# Patient Record
Sex: MALE | Race: WHITE | Employment: OTHER | URBAN - METROPOLITAN AREA
[De-identification: names, ages, dates, MRNs, and addresses within clinical notes are randomized per-mention and may not be internally consistent; named-entity substitution may affect disease eponyms.]

---

## 2019-05-03 ENCOUNTER — TELEPHONE (OUTPATIENT)
Dept: INTERNAL MEDICINE CLINIC | Age: 79
End: 2019-05-03

## 2019-05-03 NOTE — TELEPHONE ENCOUNTER
Pt is requesting to speak with someone regarding upcoming surgery. Pt best contact is [phone]. Message received & copied from [employer].  Has a New Patient appt on 5/28/19

## 2019-05-03 NOTE — TELEPHONE ENCOUNTER
Called, spoke to pt. Two identifiers confirmed. Recommended pt call daily for cancellations. Pt verbalized understanding of information discussed w/ no further questions at this time.

## 2019-05-28 ENCOUNTER — OFFICE VISIT (OUTPATIENT)
Dept: INTERNAL MEDICINE CLINIC | Age: 79
End: 2019-05-28

## 2019-05-28 VITALS
TEMPERATURE: 97.1 F | HEART RATE: 62 BPM | SYSTOLIC BLOOD PRESSURE: 143 MMHG | OXYGEN SATURATION: 95 % | RESPIRATION RATE: 16 BRPM | DIASTOLIC BLOOD PRESSURE: 76 MMHG | HEIGHT: 70 IN | BODY MASS INDEX: 28.35 KG/M2 | WEIGHT: 198 LBS

## 2019-05-28 DIAGNOSIS — I25.10 CORONARY ARTERY DISEASE INVOLVING NATIVE CORONARY ARTERY OF NATIVE HEART WITHOUT ANGINA PECTORIS: ICD-10-CM

## 2019-05-28 DIAGNOSIS — E78.00 PURE HYPERCHOLESTEROLEMIA: ICD-10-CM

## 2019-05-28 DIAGNOSIS — I10 ESSENTIAL HYPERTENSION: ICD-10-CM

## 2019-05-28 DIAGNOSIS — Z12.5 PROSTATE CANCER SCREENING: ICD-10-CM

## 2019-05-28 DIAGNOSIS — R73.09 ELEVATED GLUCOSE: ICD-10-CM

## 2019-05-28 DIAGNOSIS — M25.551 RIGHT HIP PAIN: Primary | ICD-10-CM

## 2019-05-28 PROBLEM — L71.9 ROSACEA: Status: ACTIVE | Noted: 2019-05-28

## 2019-05-28 PROBLEM — G60.9 IDIOPATHIC PERIPHERAL NEUROPATHY: Status: ACTIVE | Noted: 2019-05-28

## 2019-05-28 PROBLEM — Z86.010 HX OF COLONIC POLYPS: Status: ACTIVE | Noted: 2019-05-28

## 2019-05-28 PROBLEM — Z87.39 HX OF GOUT: Status: ACTIVE | Noted: 2019-05-28

## 2019-05-28 RX ORDER — HYDROCODONE BITARTRATE AND ACETAMINOPHEN 5; 325 MG/1; MG/1
1 TABLET ORAL
Qty: 20 TAB | Refills: 0 | Status: SHIPPED | OUTPATIENT
Start: 2019-05-28 | End: 2019-05-31

## 2019-05-28 RX ORDER — ALLOPURINOL 100 MG/1
100 TABLET ORAL DAILY
Qty: 90 TAB | Refills: 3 | Status: SHIPPED | OUTPATIENT
Start: 2019-05-28 | End: 2020-06-09

## 2019-05-28 RX ORDER — PYRIDOXINE HCL (VITAMIN B6) 100 MG
100 TABLET ORAL DAILY
COMMUNITY
End: 2021-12-07 | Stop reason: ALTCHOICE

## 2019-05-28 RX ORDER — BISMUTH SUBSALICYLATE 262 MG
1 TABLET,CHEWABLE ORAL DAILY
COMMUNITY

## 2019-05-28 RX ORDER — GABAPENTIN 300 MG/1
300 CAPSULE ORAL 3 TIMES DAILY
COMMUNITY
End: 2019-05-28 | Stop reason: SDUPTHER

## 2019-05-28 RX ORDER — LOSARTAN POTASSIUM 50 MG/1
50 TABLET ORAL DAILY
Qty: 90 TAB | Refills: 3 | Status: SHIPPED | OUTPATIENT
Start: 2019-05-28 | End: 2020-06-09

## 2019-05-28 RX ORDER — ATENOLOL 25 MG/1
25 TABLET ORAL DAILY
Qty: 90 TAB | Refills: 3 | Status: SHIPPED | OUTPATIENT
Start: 2019-05-28 | End: 2020-06-09

## 2019-05-28 RX ORDER — LOSARTAN POTASSIUM 50 MG/1
TABLET ORAL DAILY
COMMUNITY
End: 2019-05-28 | Stop reason: SDUPTHER

## 2019-05-28 RX ORDER — GABAPENTIN 300 MG/1
300 CAPSULE ORAL 3 TIMES DAILY
Qty: 270 CAP | Refills: 3 | Status: SHIPPED | OUTPATIENT
Start: 2019-05-28 | End: 2019-06-03 | Stop reason: SDUPTHER

## 2019-05-28 RX ORDER — ALLOPURINOL 100 MG/1
TABLET ORAL DAILY
COMMUNITY
End: 2019-05-28 | Stop reason: SDUPTHER

## 2019-05-28 RX ORDER — GLUCOSAMINE SULFATE 1500 MG
1000 POWDER IN PACKET (EA) ORAL 2 TIMES DAILY
COMMUNITY
End: 2021-12-14 | Stop reason: ALTCHOICE

## 2019-05-28 RX ORDER — ATENOLOL 25 MG/1
25 TABLET ORAL DAILY
COMMUNITY
End: 2019-05-28 | Stop reason: SDUPTHER

## 2019-05-28 RX ORDER — ATORVASTATIN CALCIUM 80 MG/1
80 TABLET, FILM COATED ORAL DAILY
COMMUNITY
End: 2019-05-28 | Stop reason: SDUPTHER

## 2019-05-28 RX ORDER — ASPIRIN 81 MG/1
TABLET ORAL DAILY
COMMUNITY
End: 2019-06-14

## 2019-05-28 RX ORDER — ATORVASTATIN CALCIUM 80 MG/1
80 TABLET, FILM COATED ORAL DAILY
Qty: 90 TAB | Refills: 3 | Status: SHIPPED | OUTPATIENT
Start: 2019-05-28 | End: 2020-06-09

## 2019-05-28 NOTE — PROGRESS NOTES
Chief Complaint   Patient presents with   [facility] Establish Care     New patient

## 2019-05-28 NOTE — PROGRESS NOTES
HISTORY OF PRESENT ILLNESS  Reggie Luong is a 78 y.o. male. HPI     Pt here to establish care  Moved from [facility] a few months ago  PCP was Dr Corey Rahman  Hx HTN HLD gout CAD s/p cabg x 5 in  2001 and stent 2008  Has established here with Dr Melissa Calvert for cardiology and already had preop clearance visit last week for upcoming right hip surgery  Scheduled for right hip replacement 6-13-19-Dr Fiona Oates  Still able to walk up 1 flight of stairs  No cp or sob  No problems in the past with surgery or anesthesia  Hip pain is getting worse and not able to sleep at night despite tramadol   Has peripheral neuropathy in feet and legs and may want to establish with a neurologist in this area    Retired  -worked in [employer]    Patient Active Problem List    Diagnosis Date Noted    HTN (hypertension) 05/28/2019    Pure hypercholesterolemia 05/28/2019    Hx of gout 05/28/2019    Hx of colonic polyps 05/28/2019    Coronary artery disease involving native coronary artery of native heart without angina pectoris 05/28/2019     Current Outpatient Medications   Medication Sig Dispense Refill    aspirin delayed-release 81 mg tablet Take  by mouth daily.  multivitamin (ONE A DAY) tablet Take 1 Tab by mouth daily.  omega 3-dha-epa-fish oil (FISH OIL) 100-160-1,000 mg cap Take  by mouth two (2) times a day. 2 tabs bid      pyridoxine, vitamin B6, (VITAMIN B-6) 100 mg tablet Take 100 mg by mouth daily.  cholecalciferol (VITAMIN D3) 1,000 unit cap Take  by mouth daily.  atorvastatin (LIPITOR) 80 mg tablet Take 1 Tab by mouth daily. 90 Tab 3    losartan (COZAAR) 50 mg tablet Take 1 Tab by mouth daily. 90 Tab 3    gabapentin (NEURONTIN) 300 mg capsule Take 1 Cap by mouth three (3) times daily. 270 Cap 3    allopurinol (ZYLOPRIM) 100 mg tablet Take 1 Tab by mouth daily. 90 Tab 3    atenolol (TENORMIN) 25 mg tablet Take 1 Tab by mouth daily.  90 Tab 3    HYDROcodone-acetaminophen (NORCO) 5-325 mg per tablet Take 1 Tab by mouth nightly as needed for Pain for up to 3 days. Max Daily Amount: 1 Tab. 20 Tab 0     Not on File  No past medical history on file. Past Surgical History:   Procedure Laterality Date    HX CORONARY ARTERY BYPASS GRAFT  2001    5v    HX HIP REPLACEMENT Left 2017    HX KNEE ARTHROSCOPY  2007    right knee     No family history on file. Social History     Tobacco Use    Smoking status: Never Smoker    Smokeless tobacco: Never Used   Substance Use Topics    Alcohol use: Yes     Frequency: 2-3 times a week     Drinks per session: 1 or 2     Binge frequency: Never           Review of Systems   Constitutional: Negative for chills, fever, malaise/fatigue and weight loss. HENT: Negative. Eyes: Negative for blurred vision and double vision. Respiratory: Negative for cough and shortness of breath. Cardiovascular: Negative for chest pain and palpitations. Gastrointestinal: Negative for abdominal pain, blood in stool, constipation, diarrhea, melena, nausea and vomiting. Genitourinary: Negative for dysuria, frequency, hematuria and urgency. Musculoskeletal: Positive for joint pain. Negative for back pain, falls and myalgias. Skin: Positive for rash. rosacea   Neurological: Negative for dizziness, tremors and headaches. Neuropathic symptoms in lower legs and feet       Physical Exam   Constitutional: He appears well-developed and well-nourished. No distress. Appears stated age   HENT:   Head: Normocephalic. Mouth/Throat: Oropharynx is clear and moist.   Eyes: Pupils are equal, round, and reactive to light. Neck: Normal range of motion. Neck supple. No JVD present. No tracheal deviation present. No thyromegaly present. Cardiovascular: Normal rate, regular rhythm and normal heart sounds. Exam reveals no gallop and no friction rub. No murmur heard. Pulmonary/Chest: Effort normal and breath sounds normal. No respiratory distress. He has no wheezes. He has no rales. He exhibits no tenderness. Abdominal: Soft. He exhibits no mass. There is no tenderness. There is no rebound and no guarding. Musculoskeletal: He exhibits no edema. Lymphadenopathy:     He has no cervical adenopathy. Neurological: He is alert. Coordination normal.   Skin: Skin is warm. There is erythema. Gladys-nasal redness    Psychiatric: He has a normal mood and affect. His behavior is normal. Judgment normal.   Nursing note and vitals reviewed. ASSESSMENT and PLAN  Diagnoses and all orders for this visit:    1. Right hip pain  -     HYDROcodone-acetaminophen (NORCO) 5-325 mg per tablet; Take 1 Tab by mouth nightly as needed for Pain for up to 3 days. Max Daily Amount: 1 Tab. 20 tabs   Acceptable candidate and risk for surgery pending preop labs   Had clearance from Dr Denise Gill    2. Essential hypertension   controlled  3. Pure hypercholesterolemia  -     LIPID PANEL   On statin  4. Coronary artery disease involving native coronary artery of native heart without angina pectoris   Stable, no cp or angina symptoms  5. Prostate cancer screening  -     PSA SCREENING (SCREENING)    6. Elevated glucose  -     HEMOGLOBIN A1C WITH EAG    7. Preventive   May be due to PCV 13    May be due for colonoscopy next year d/t hx colon polyps  Other orders  -     atorvastatin (LIPITOR) 80 mg tablet; Take 1 Tab by mouth daily. -     losartan (COZAAR) 50 mg tablet; Take 1 Tab by mouth daily.  -     gabapentin (NEURONTIN) 300 mg capsule; Take 1 Cap by mouth three (3) times daily. -     allopurinol (ZYLOPRIM) 100 mg tablet; Take 1 Tab by mouth daily. -     atenolol (TENORMIN) 25 mg tablet; Take 1 Tab by mouth daily. Pt will sign release of records from Dr Laura Talbot and Dispositions    · Return in about 6 months (around 11/28/2019) for htn hld glucose refills.

## 2019-05-29 LAB
CHOLEST SERPL-MCNC: 109 MG/DL (ref 100–199)
EST. AVERAGE GLUCOSE BLD GHB EST-MCNC: 140 MG/DL
HBA1C MFR BLD: 6.5 % (ref 4.8–5.6)
HDLC SERPL-MCNC: 42 MG/DL
LDLC SERPL CALC-MCNC: 46 MG/DL (ref 0–99)
PSA SERPL-MCNC: 2.2 NG/ML (ref 0–4)
TRIGL SERPL-MCNC: 103 MG/DL (ref 0–149)
VLDLC SERPL CALC-MCNC: 21 MG/DL (ref 5–40)

## 2019-06-03 ENCOUNTER — PATIENT MESSAGE (OUTPATIENT)
Dept: INTERNAL MEDICINE CLINIC | Age: 79
End: 2019-06-03

## 2019-06-03 RX ORDER — GABAPENTIN 300 MG/1
600 CAPSULE ORAL 3 TIMES DAILY
Qty: 540 CAP | Refills: 3 | Status: SHIPPED | OUTPATIENT
Start: 2019-06-03 | End: 2019-12-16 | Stop reason: SDUPTHER

## 2019-06-03 NOTE — TELEPHONE ENCOUNTER
From: Alexis Dennison  To: Shay Mayen MD  Sent: 6/3/2019 11:10 AM EDT  Subject: Prescription Question    Dr. Johansen Headings,    My mail order prescription provider is [facility]. I understand they may be in the process of contacting you to determine the correct dosage of Gabapentin. The conflict is with my previous doctor's instructions of two caps, three times per day. Your script indicates one cap, three times per day. I was initially prescribed the original dosage by a past Neurologist and therefore followed up by Dr. Jose Thomas. Please advise if you recommend a lower dosage.     Thank you,    Alexis Dennison

## 2019-06-03 NOTE — TELEPHONE ENCOUNTER
From  Anshul Rothman To  [facility] Nurse Pool Sent  6/3/2019 11:10 AM   ----- Message from [address] [place] [address] [address], Generic sent at 6/3/2019 11:10 AM EDT -----     Dr. Krys Anaya,     My mail order prescription provider is [facility].  I understand they may be in the process of contacting you to determine the correct dosage of Gabapentin.  The conflict is with my previous doctor's instructions of two caps, three times per day.  Your script indicates one cap, three times per day.  I was initially prescribed the original dosage by a past Neurologist and therefore followed up by Dr. Mera Dent advise if you recommend a lower dosage.      Thank you,     Anshul Rothman

## 2019-06-04 ENCOUNTER — HOSPITAL ENCOUNTER (OUTPATIENT)
Dept: PREADMISSION TESTING | Age: 79
Discharge: HOME | End: 2019-06-04
Attending: ORTHOPAEDIC SURGERY
Payer: MEDICARE

## 2019-06-04 VITALS
WEIGHT: 199.08 LBS | HEIGHT: 70 IN | SYSTOLIC BLOOD PRESSURE: 150 MMHG | RESPIRATION RATE: 18 BRPM | TEMPERATURE: 97.4 F | DIASTOLIC BLOOD PRESSURE: 67 MMHG | HEART RATE: 66 BPM | OXYGEN SATURATION: 97 % | BODY MASS INDEX: 28.5 KG/M2

## 2019-06-04 DIAGNOSIS — R73.09 ELEVATED HEMOGLOBIN A1C: Primary | ICD-10-CM

## 2019-06-04 LAB
ABO + RH BLD: NORMAL
ALBUMIN SERPL-MCNC: 3.6 G/DL (ref 3.5–5)
ALBUMIN/GLOB SERPL: 0.9 {RATIO} (ref 1.1–2.2)
ALP SERPL-CCNC: 95 U/L (ref 45–117)
ALT SERPL-CCNC: 29 U/L (ref 12–78)
ANION GAP SERPL CALC-SCNC: 6 MMOL/L (ref 5–15)
APPEARANCE UR: CLEAR
AST SERPL-CCNC: 21 U/L (ref 15–37)
BACTERIA URNS QL MICRO: NEGATIVE /HPF
BILIRUB SERPL-MCNC: 0.6 MG/DL (ref 0.2–1)
BILIRUB UR QL: NEGATIVE
BLOOD GROUP ANTIBODIES SERPL: NORMAL
BUN SERPL-MCNC: 19 MG/DL (ref 6–20)
BUN/CREAT SERPL: 19 (ref 12–20)
CALCIUM SERPL-MCNC: 9 MG/DL (ref 8.5–10.1)
CHLORIDE SERPL-SCNC: 106 MMOL/L (ref 97–108)
CO2 SERPL-SCNC: 28 MMOL/L (ref 21–32)
COLOR UR: ABNORMAL
CREAT SERPL-MCNC: 0.99 MG/DL (ref 0.7–1.3)
EPITH CASTS URNS QL MICRO: ABNORMAL /LPF
ERYTHROCYTE [DISTWIDTH] IN BLOOD BY AUTOMATED COUNT: 14.1 % (ref 11.5–14.5)
GLOBULIN SER CALC-MCNC: 4.1 G/DL (ref 2–4)
GLUCOSE SERPL-MCNC: 157 MG/DL (ref 65–100)
GLUCOSE UR STRIP.AUTO-MCNC: NEGATIVE MG/DL
HCT VFR BLD AUTO: 41.5 % (ref 36.6–50.3)
HGB BLD-MCNC: 14.3 G/DL (ref 12.1–17)
HGB UR QL STRIP: ABNORMAL
HYALINE CASTS URNS QL MICRO: ABNORMAL /LPF (ref 0–5)
INR PPP: 1.1 (ref 0.9–1.1)
KETONES UR QL STRIP.AUTO: NEGATIVE MG/DL
LEUKOCYTE ESTERASE UR QL STRIP.AUTO: NEGATIVE
MCH RBC QN AUTO: 31 PG (ref 26–34)
MCHC RBC AUTO-ENTMCNC: 34.5 G/DL (ref 30–36.5)
MCV RBC AUTO: 89.8 FL (ref 80–99)
NITRITE UR QL STRIP.AUTO: NEGATIVE
NRBC # BLD: 0 K/UL (ref 0–0.01)
NRBC BLD-RTO: 0 PER 100 WBC
PH UR STRIP: 6 [PH] (ref 5–8)
PLATELET # BLD AUTO: 208 K/UL (ref 150–400)
PMV BLD AUTO: 10.2 FL (ref 8.9–12.9)
POTASSIUM SERPL-SCNC: 4 MMOL/L (ref 3.5–5.1)
PROT SERPL-MCNC: 7.7 G/DL (ref 6.4–8.2)
PROT UR STRIP-MCNC: NEGATIVE MG/DL
PROTHROMBIN TIME: 11.7 SEC (ref 9–11.1)
RBC # BLD AUTO: 4.62 M/UL (ref 4.1–5.7)
RBC #/AREA URNS HPF: ABNORMAL /HPF (ref 0–5)
SODIUM SERPL-SCNC: 140 MMOL/L (ref 136–145)
SP GR UR REFRACTOMETRY: 1.02 (ref 1–1.03)
SPECIMEN EXP DATE BLD: NORMAL
UA: UC IF INDICATED,UAUC: ABNORMAL
UROBILINOGEN UR QL STRIP.AUTO: 0.2 EU/DL (ref 0.2–1)
WBC # BLD AUTO: 8.9 K/UL (ref 4.1–11.1)
WBC URNS QL MICRO: ABNORMAL /HPF (ref 0–4)

## 2019-06-04 PROCEDURE — 85027 COMPLETE CBC AUTOMATED: CPT

## 2019-06-04 PROCEDURE — 81001 URINALYSIS AUTO W/SCOPE: CPT

## 2019-06-04 PROCEDURE — 85610 PROTHROMBIN TIME: CPT

## 2019-06-04 PROCEDURE — 86900 BLOOD TYPING SEROLOGIC ABO: CPT

## 2019-06-04 PROCEDURE — 36415 COLL VENOUS BLD VENIPUNCTURE: CPT

## 2019-06-04 PROCEDURE — 80053 COMPREHEN METABOLIC PANEL: CPT

## 2019-06-04 RX ORDER — TRAMADOL HYDROCHLORIDE 50 MG/1
50 TABLET ORAL
COMMUNITY
End: 2019-06-14

## 2019-06-04 RX ORDER — CEFAZOLIN SODIUM/WATER 2 G/20 ML
2 SYRINGE (ML) INTRAVENOUS ONCE
Status: CANCELLED | OUTPATIENT
Start: 2019-06-13 | End: 2019-06-13

## 2019-06-04 RX ORDER — PREGABALIN 75 MG/1
75 CAPSULE ORAL ONCE
Status: CANCELLED | OUTPATIENT
Start: 2019-06-13 | End: 2019-06-13

## 2019-06-04 RX ORDER — CELECOXIB 200 MG/1
400 CAPSULE ORAL ONCE
Status: CANCELLED | OUTPATIENT
Start: 2019-06-13 | End: 2019-06-13

## 2019-06-04 RX ORDER — SODIUM CHLORIDE, SODIUM LACTATE, POTASSIUM CHLORIDE, CALCIUM CHLORIDE 600; 310; 30; 20 MG/100ML; MG/100ML; MG/100ML; MG/100ML
25 INJECTION, SOLUTION INTRAVENOUS CONTINUOUS
Status: CANCELLED | OUTPATIENT
Start: 2019-06-13

## 2019-06-04 RX ORDER — ACETAMINOPHEN 500 MG
1000 TABLET ORAL ONCE
Status: CANCELLED | OUTPATIENT
Start: 2019-06-13 | End: 2019-06-13

## 2019-06-04 RX ORDER — HYDROCODONE BITARTRATE AND ACETAMINOPHEN 5; 325 MG/1; MG/1
1 TABLET ORAL
COMMUNITY
End: 2019-06-14

## 2019-06-04 RX ORDER — ZOLPIDEM TARTRATE 10 MG/1
5 TABLET ORAL
COMMUNITY
End: 2019-11-26 | Stop reason: ALTCHOICE

## 2019-06-04 NOTE — PERIOP NOTES
Orthopedic and Spine Patients: Instructions on When You Can    Eat or Drink Before Surgery    You were given 2 pre-surgery drinks at your pre-admission testing appointment.   Night before surgery:    o Drink one pre-surgery bottle at bedtime. o  No Food after midnight!   Day of Surgery:    o  Drink the 2nd  pre-surgery bottle 1 hour before you get to the hospital.        For questions call Pre-Admission Testing at [phone]. They are available from 8:00 am-5:00 pm, Monday through Friday.

## 2019-06-04 NOTE — PERIOP NOTES
Preventing Infections Before and After - Your Surgery    IMPORTANT INSTRUCTIONS    Please read and follow these instructions carefully. If you are unable to comply with the below instructions your procedure will be cancelled. Every Night for Three (3) nights before your surgery:  1. Shower with an antibacterial soap, such as Dial, or the soap provided at your preassessment appointment. A shower is better than a bath for cleaning your skin. 2. If needed, ask someone to help you reach all areas of your body. Dont forget to clean your belly button with every shower. The night before your surgery: If you lose your Hibiclens please contact surgery center or you can purchase it at a local pharmacy  1. On the night before your surgery, shower with an antibacterial soap, such as Dial, or the soap provided at your preassessment appointment. 2. With one packet of Hibiclens in hand, turn water off.  3. Apply Hibiclens antiseptic skin cleanser with a clean, freshly washed washcloth. ? Gently apply to your body from chin to toes (except the genital area) and especially the area(s) where your incision(s) will be. ? Leave Hibiclens on your skin for at least 20 seconds. CAUTION: If needed, Hibiclens may be used to clean the folds of skin of the legs (such as in the area of the groin) and on your buttocks and hips. However, do not use Hibiclens above the neck or in the genital area (your bottom) or put inside any area of your body. 4. Turn the water back on and rinse. 5. Dry gently with a clean, freshly washed towel. 6. After your shower, do not use any powder, deodorant, perfumes or lotion. 7. Use clean, freshly washed towels and washcloths every time you shower. 8. Wear clean, freshly washed pajamas to bed the night before surgery. 9. Sleep on clean, freshly washed sheets. 10. Do not allow pets to sleep in your bed with you. The Morning of your surgery:  1.  Shower again thoroughly with an antibacterial soap, such as Dial or the soap provided at your preassessment appointment. If needed, ask someone for help to reach all areas of your body. Dont forget to clean your belly button! Rinse. 2. Dry gently with a clean, freshly washed towel. 3. After your shower, do not use any powder, deodorant, perfumes or lotion prior to surgery. 4. Put on clean, freshly washed clothing. Tips to help prevent infections after your surgery:  1. Protect your surgical wound from germs:  ? Hand washing is the most important thing you and your caregivers can do to prevent infections. ? Keep your bandage clean and dry! ? Do not touch your surgical wound. 2. Use clean, freshly washed towels and washcloths every time you shower; do not share bath linens with others. 3. Until your surgical wound is healed, wear clothing and sleep on bed linens each day that are clean and freshly washed. 4. Do not allow pets to sleep in your bed with you or touch your surgical wound. 5. Do not smoke - smoking delays wound healing. This may be a good time to stop smoking. 6. If you have diabetes, it is important for you to manage your blood sugar levels properly before your surgery as well as after your surgery. Poorly managed blood sugar levels slow down wound healing and prevent you from healing completely. If you lose your Hibiclens, please call the [facility], or it is available for purchase at your pharmacy.                ___________________      ___________________      ________________  (Signature of Patient)          (Witness)                   (Date and Time)

## 2019-06-04 NOTE — PERIOP NOTES
[facility]  Joint/Spine Preoperative Instructions        Surgery Date 06/13/19          Time of Arrival to be called with time  Contact # [phone], home    1. On the day of your surgery, please report to the Surgical Services Registration Desk and sign in at your designated time. The Surgery Center is located to the right of the Emergency Room. 2. You must have someone with you to drive you home. You should not drive a car for 24 hours following surgery. Please make arrangements for a friend or family member to stay with you for the first 24 hours after your surgery. 3. No food after midnight 06/12/19. Medications morning of surgery should be taken with a sip of water. Please follow pre-surgery drink instructions that were given at your Pre Admission Testing appointment. 4. We recommend you do not drink any alcoholic beverages for 24 hours before and after your surgery. 5. Contact your surgeons office for instructions on the following medications: non-steroidal anti-inflammatory drugs (i.e. Advil, Aleve), vitamins, and supplements. (Some surgeons will want you to stop these medications prior to surgery and others may allow you to take them)  **If you are currently taking Plavix, Coumadin, Aspirin and/or other blood-thinning agents, contact your surgeon for instructions. ** Your surgeon will partner with the physician prescribing these medications to determine if it is safe to stop or if you need to continue taking. Please do not stop taking these medications without instructions from your surgeon    6. Wear comfortable clothes. Wear glasses instead of contacts. Do not bring any money or jewelry. Please bring picture ID, insurance card, and any prearranged co-payment or hospital payment. Do not wear make-up, particularly mascara the morning of your surgery. Do not wear nail polish, particularly if you are having foot /hand surgery.   Wear your hair loose or down, no ponytails, buns, rob pins or clips. All body piercings must be removed. Please shower with antibacterial soap for three consecutive days before and on the morning of surgery, but do not apply any lotions, powders or deodorants after the shower on the day of surgery. Please use a fresh towels after each shower. Please sleep in clean clothes and change bed linens the night before surgery. Please do not shave for 48 hours prior to surgery. Shaving of the face is acceptable. 7. You should understand that if you do not follow these instructions your surgery may be cancelled. If your physical condition changes (I.e. fever, cold or flu) please contact your surgeon as soon as possible. 8. It is important that you be on time. If a situation occurs where you may be late, please call [phone] (OR Holding Area). 9. If you have any questions and or problems, please call [phone] (Pre-admission Testing). 10. Your surgery time may be subject to change. You will receive a phone call the evening prior if your time changes. 11.  If having outpatient surgery, you must have someone to drive you here, stay with you during the duration of your stay, and to drive you home at time of discharge. 12.   In an effort to improve the efficiency, privacy, and safety for all of our Pre-op patients visitors are not allowed in the Holding area. Once you arrive and are registered your family/visitors will be asked to remain in the waiting room. The Pre-op staff will get you from the Surgical Waiting Area and will explain to you and your family/visitors that the Pre-op phase is beginning. The staff will answer any questions and provide instructions for tracking of the patient, by use of the existing tracking number and color-coded status board in the waiting room.   At this time the staff will also ask for your designated spokesperson information in the event that the physician or staff need to provide an update or obtain any pertinent information. The designated spokesperson will be notified if the physician needs to speak to family during the pre-operative phase. If at any time your family/visitors has questions or concerns they may approach the volunteer desk in the waiting area for assistance. Special Instructions: Practice Incentive Spirometer, Pre-surgery drink per instructions. MEDICATIONS TO TAKE THE MORNING OF SURGERY WITH A SIP OF WATER:  Gabapentin, Allopurinol, hydrocodone, tramadol. I understand a pre-operative phone call will be made to verify my surgery time.   In the event that I am not available, I give permission for a message to be left on my answering service and/or with another person?  yes patient and spouse Uziel Organ         ___________________      __________   _________    (Signature of Patient)             (Witness)                (Date and Time)

## 2019-06-04 NOTE — PERIOP NOTES
Incentive Spirometer        Using the incentive spirometer helps expand the small air sacs of your lungs, helps you breathe deeply, and helps improve your lung function. Use your incentive spirometer twice a day (10 breaths each time) prior to surgery. How to Use Your Incentive Spirometer:  1. Hold the incentive spirometer in an upright position. 2. Breathe out as usual.   3. Place the mouthpiece in your mouth and seal your lips tightly around it. 4. Take a deep breath. Breathe in slowly and as deeply as possible. Keep the blue flow rate guide between the arrows. 5. Hold your breath as long as possible. Then exhale slowly and allow the piston to fall to the bottom of the column. 6. Rest for a few seconds and repeat steps one through five at least 10 times. PAT Tidal Volume_____2000_____________  x________________  Date______06/04/19_________________    Petr Lantigua THE INCENTIVE SPIROMETER WITH YOU TO THE HOSPITAL ON THE DAY OF YOUR SURGERY. Opportunity given to ask and answer questions as well as to observe return demonstration.     Patient signature_____________________________    Witness____________________________

## 2019-06-05 LAB
BACTERIA SPEC CULT: NORMAL
BACTERIA SPEC CULT: NORMAL
SERVICE CMNT-IMP: NORMAL

## 2019-06-06 NOTE — ADVANCED PRACTICE NURSE
PAT Nurse Practitioner   Pre-Operative Chart Review/Assessment:-ORTHOPEDIC/NEUROSURGICAL SPINE                Patient Name:  Wilber Oviedo                                                         Age:   78 y.o.    :  1940     Today's Date:  2019     Date of PAT:   19     Date of Surgery:    19      Procedure(s):   right Total Hip Replacement     Surgeon:   Heydi Johnson     Medical Clearance:  Dr. Rocky Crane is PCP                   PLAN:      1)  Cardiac Clearance:  Dr. Karlie Medrano        2)  Diabetic Treatment Consult:  Ordered for  A1C 6.5 (not currently tx)       3)  Sleep Apnea evaluation:   BORIS 4- Reports previous evaluation for BORIS (denies witnessed apnea)       4) Treatment for MRSA/Staph Aureus:  Negative      5) Additional Concerns:  Reports previous adverse reaction to anesthesia in 2018 (hallucinations)                 Vital Signs:         Vitals:    19 0823 19 0828   BP:  150/67   Pulse:  66   Resp:  18   Temp:  97.4 °F (36.3 °C)   SpO2:  97%   Weight: 90.3 kg (199 lb 1.2 oz)    Height: 5' 10\" (1.778 m)             ____________________________________________  PAST MEDICAL HISTORY  Past Medical History:   Diagnosis Date    Adverse effect of anesthesia     hallucinations/agitation after last surgery 2018 in hospital 3 days    Arthritis     knee and hip/right    CAD (coronary artery disease)     Diabetes ([address]     Hgb A1C 6.1 2019 - No meds, dx Pre-DM being monitored by PCP    Hypercholesteremia     Hypertension     Nausea & vomiting     Sleep apnea     Borderline/ No CPAP      ____________________________________________  PAST SURGICAL HISTORY  Past Surgical History:   Procedure Laterality Date    CARDIAC SURG PROCEDURE UNLIST  2001    Bypass    CARDIAC SURG PROCEDURE UNLIST      stents    HX APPENDECTOMY      HX CORONARY ARTERY BYPASS GRAFT      5v    HX GI      umbilical hernia x3    HX HIP REPLACEMENT Left 2017    HX KNEE ARTHROSCOPY Right  right knee    TOTAL KNEE ARTHROPLASTY Left 2017    left hip replacement      ____________________________________________  HOME MEDICATIONS    Current Outpatient Medications   Medication Sig    zolpidem (AMBIEN) 10 mg tablet Take 5 mg by mouth nightly as needed for Sleep.  HYDROcodone-acetaminophen (NORCO) 5-325 mg per tablet Take 1 Tab by mouth nightly.  traMADol (ULTRAM) 50 mg tablet Take 50 mg by mouth every six (6) hours as needed for Pain.  gabapentin (NEURONTIN) 300 mg capsule Take 2 Caps by mouth three (3) times daily.  aspirin delayed-release 81 mg tablet Take  by mouth daily.  multivitamin (ONE A DAY) tablet Take 1 Tab by mouth daily.  omega 3-dha-epa-fish oil (FISH OIL) 100-160-1,000 mg cap Take  by mouth two (2) times a day. 2 tabs bid    pyridoxine, vitamin B6, (VITAMIN B-6) 100 mg tablet Take 100 mg by mouth daily.  cholecalciferol (VITAMIN D3) 1,000 unit cap Take  by mouth daily.  atorvastatin (LIPITOR) 80 mg tablet Take 1 Tab by mouth daily. (Patient taking differently: Take 80 mg by mouth nightly.)    losartan (COZAAR) 50 mg tablet Take 1 Tab by mouth daily.  allopurinol (ZYLOPRIM) 100 mg tablet Take 1 Tab by mouth daily.  atenolol (TENORMIN) 25 mg tablet Take 1 Tab by mouth daily. (Patient taking differently: Take 25 mg by mouth nightly.)     No current facility-administered medications for this encounter.       ____________________________________________  ALLERGIES  Not on File   ____________________________________________  SOCIAL HISTORY  Social History     Tobacco Use    Smoking status: Never Smoker    Smokeless tobacco: Never Used   Substance Use Topics    Alcohol use: Yes     Alcohol/week: 1.8 oz     Types: 3 Cans of beer per week     Frequency: 2-3 times a week     Drinks per session: 1 or 2     Binge frequency: Never      ____________________________________________        Labs:   Results for Jack Kwon (MRN [id number]) as of 6/6/2019 13:29   Ref.  Range 6/4/2019 08:26   WBC Latest Ref Range: 4.1 - 11.1 K/uL 8.9   NRBC Latest Ref Range: 0  WBC 0.0   RBC Latest Ref Range: 4.10 - 5.70 M/uL 4.62   HGB Latest Ref Range: 12.1 - 17.0 g/dL 14.3   HCT Latest Ref Range: 36.6 - 50.3 % 41.5   MCV Latest Ref Range: 80.0 - 99.0 FL 89.8   MCH Latest Ref Range: 26.0 - 34.0 PG 31.0   MCHC Latest Ref Range: 30.0 - 36.5 g/dL 34.5   RDW Latest Ref Range: 11.5 - 14.5 % 14.1   PLATELET Latest Ref Range: 150 - 400 K/uL 208   MPV Latest Ref Range: 8.9 - 12.9 FL 10.2   ABSOLUTE NRBC Latest Ref Range: 0.00 - 0.01 K/uL 0.00   Color Latest Units:   YELLOW/STRAW   Appearance Latest Ref Range: CLEAR   CLEAR   Specific gravity Latest Ref Range: 1.003 - 1.030   1.016   pH (UA) Latest Ref Range: 5.0 - 8.0   6.0   Protein Latest Ref Range: NEG mg/dL NEGATIVE   Glucose Latest Ref Range: NEG mg/dL NEGATIVE   Ketone Latest Ref Range: NEG mg/dL NEGATIVE   Blood Latest Ref Range: NEG   TRACE (A)   Bilirubin Latest Ref Range: NEG   NEGATIVE   Urobilinogen Latest Ref Range: 0.2 - 1.0 EU/dL 0.2   Nitrites Latest Ref Range: NEG   NEGATIVE   Leukocyte Esterase Latest Ref Range: NEG   NEGATIVE   Epithelial cells Latest Ref Range: FEW /lpf FEW   WBC Latest Ref Range: 0 - 4 /hpf 0-4   RBC Latest Ref Range: 0 - 5 /hpf 5-10   Bacteria Latest Ref Range: NEG /hpf NEGATIVE   Hyaline cast Latest Ref Range: 0 - 5 /lpf 0-2   INR Latest Ref Range: 0.9 - 1.1   1.1   Prothrombin time Latest Ref Range: 9.0 - 11.1 sec 11.7 (H)   Sodium Latest Ref Range: 136 - 145 mmol/L 140   Potassium Latest Ref Range: 3.5 - 5.1 mmol/L 4.0   Chloride Latest Ref Range: 97 - 108 mmol/L 106   CO2 Latest Ref Range: 21 - 32 mmol/L 28   Anion gap Latest Ref Range: 5 - 15 mmol/L 6   Glucose Latest Ref Range: 65 - 100 mg/dL 157 (H)   BUN Latest Ref Range: 6 - 20 MG/DL 19   Creatinine Latest Ref Range: 0.70 - 1.30 MG/DL 0.99   BUN/Creatinine ratio Latest Ref Range: 12 - 20   19   Calcium Latest Ref Range: 8.5 - 10.1 MG/DL 9.0   GFR est non-AA Latest Ref Range: >60 ml/min/1.73m2 >60   GFR est AA Latest Ref Range: >60 ml/min/1.73m2 >60   Bilirubin, total Latest Ref Range: 0.2 - 1.0 MG/DL 0.6   Protein, total Latest Ref Range: 6.4 - 8.2 g/dL 7.7   Albumin Latest Ref Range: 3.5 - 5.0 g/dL 3.6   Globulin Latest Ref Range: 2.0 - 4.0 g/dL 4.1 (H)   A-G Ratio Latest Ref Range: 1.1 - 2.2   0.9 (L)   ALT (SGPT) Latest Ref Range: 12 - 78 U/L 29   AST Latest Ref Range: 15 - 37 U/L 21   Alk. phosphatase Latest Ref Range: 45 - 117 U/L 95   CULTURE, MRSA Unknown Rpt   Crossmatch Expiration Unknown 06/16/2019   TYPE & SCREEN Unknown Rpt       Skin:   Denies open wounds, cuts, sores or other areas of concern in PAT assessment.        Carlos Stout NP

## 2019-06-07 ENCOUNTER — DOCUMENTATION ONLY (OUTPATIENT)
Dept: INTERNAL MEDICINE CLINIC | Age: 79
End: 2019-06-07

## 2019-06-07 NOTE — ACP (ADVANCE CARE PLANNING)
Patient brought medical advanced directive papers . Patient 's copy was given to Vladimir Ortiz to scan in patient's chart.

## 2019-06-13 ENCOUNTER — ANESTHESIA (OUTPATIENT)
Dept: SURGERY | Age: 79
DRG: 470 | End: 2019-06-13
Payer: MEDICARE

## 2019-06-13 ENCOUNTER — HOSPITAL ENCOUNTER (INPATIENT)
Age: 79
LOS: 1 days | Discharge: HOME HEALTH | DRG: 470 | End: 2019-06-14
Attending: ORTHOPAEDIC SURGERY | Admitting: ORTHOPAEDIC SURGERY
Payer: MEDICARE

## 2019-06-13 ENCOUNTER — ANESTHESIA EVENT (OUTPATIENT)
Dept: SURGERY | Age: 79
DRG: 470 | End: 2019-06-13
Payer: MEDICARE

## 2019-06-13 ENCOUNTER — APPOINTMENT (OUTPATIENT)
Dept: GENERAL RADIOLOGY | Age: 79
DRG: 470 | End: 2019-06-13
Attending: ORTHOPAEDIC SURGERY
Payer: MEDICARE

## 2019-06-13 DIAGNOSIS — Z96.641 STATUS POST RIGHT HIP REPLACEMENT: Primary | ICD-10-CM

## 2019-06-13 PROBLEM — Z96.649 S/P HIP REPLACEMENT: Status: ACTIVE | Noted: 2019-06-13

## 2019-06-13 LAB
GLUCOSE BLD STRIP.AUTO-MCNC: 119 MG/DL (ref 65–100)
GLUCOSE BLD STRIP.AUTO-MCNC: 170 MG/DL (ref 65–100)
SERVICE CMNT-IMP: ABNORMAL
SERVICE CMNT-IMP: ABNORMAL

## 2019-06-13 PROCEDURE — 82962 GLUCOSE BLOOD TEST: CPT

## 2019-06-13 PROCEDURE — 77030018673: Performed by: ORTHOPAEDIC SURGERY

## 2019-06-13 PROCEDURE — 74011250637 HC RX REV CODE- 250/637: Performed by: ORTHOPAEDIC SURGERY

## 2019-06-13 PROCEDURE — 65270000029 HC RM PRIVATE

## 2019-06-13 PROCEDURE — 77030033138 HC SUT PGA STRATFX J&J -B: Performed by: ORTHOPAEDIC SURGERY

## 2019-06-13 PROCEDURE — 77030039267 HC ADH SKN EXOFIN S2SG -B: Performed by: ORTHOPAEDIC SURGERY

## 2019-06-13 PROCEDURE — 77030003666 HC NDL SPINAL BD -A: Performed by: ORTHOPAEDIC SURGERY

## 2019-06-13 PROCEDURE — 77030013079 HC BLNKT BAIR HGGR 3M -A: Performed by: ANESTHESIOLOGY

## 2019-06-13 PROCEDURE — 76010000171 HC OR TIME 2 TO 2.5 HR INTENSV-TIER 1: Performed by: ORTHOPAEDIC SURGERY

## 2019-06-13 PROCEDURE — 77030036722: Performed by: ORTHOPAEDIC SURGERY

## 2019-06-13 PROCEDURE — 0SR904A REPLACEMENT OF RIGHT HIP JOINT WITH CERAMIC ON POLYETHYLENE SYNTHETIC SUBSTITUTE, UNCEMENTED, OPEN APPROACH: ICD-10-PCS | Performed by: ORTHOPAEDIC SURGERY

## 2019-06-13 PROCEDURE — 77030022704 HC SUT VLOC COVD -B: Performed by: ORTHOPAEDIC SURGERY

## 2019-06-13 PROCEDURE — 97162 PT EVAL MOD COMPLEX 30 MIN: CPT

## 2019-06-13 PROCEDURE — 77030035643 HC BLD SAW OSC PRECIS STRY -C: Performed by: ORTHOPAEDIC SURGERY

## 2019-06-13 PROCEDURE — 74011250637 HC RX REV CODE- 250/637: Performed by: PHYSICIAN ASSISTANT

## 2019-06-13 PROCEDURE — 74011250636 HC RX REV CODE- 250/636: Performed by: ANESTHESIOLOGY

## 2019-06-13 PROCEDURE — 8E0YXBZ COMPUTER ASSISTED PROCEDURE OF LOWER EXTREMITY: ICD-10-PCS | Performed by: ORTHOPAEDIC SURGERY

## 2019-06-13 PROCEDURE — 97110 THERAPEUTIC EXERCISES: CPT

## 2019-06-13 PROCEDURE — 77030008684 HC TU ET CUF COVD -B: Performed by: ANESTHESIOLOGY

## 2019-06-13 PROCEDURE — C1776 JOINT DEVICE (IMPLANTABLE): HCPCS | Performed by: ORTHOPAEDIC SURGERY

## 2019-06-13 PROCEDURE — 74011250636 HC RX REV CODE- 250/636: Performed by: ORTHOPAEDIC SURGERY

## 2019-06-13 PROCEDURE — 77030011640 HC PAD GRND REM COVD -A: Performed by: ORTHOPAEDIC SURGERY

## 2019-06-13 PROCEDURE — 51798 US URINE CAPACITY MEASURE: CPT

## 2019-06-13 PROCEDURE — 74011000250 HC RX REV CODE- 250

## 2019-06-13 PROCEDURE — 74011250636 HC RX REV CODE- 250/636

## 2019-06-13 PROCEDURE — 77030035236 HC SUT PDS STRATFX BARB J&J -B: Performed by: ORTHOPAEDIC SURGERY

## 2019-06-13 PROCEDURE — 76060000035 HC ANESTHESIA 2 TO 2.5 HR: Performed by: ORTHOPAEDIC SURGERY

## 2019-06-13 PROCEDURE — 77030020782 HC GWN BAIR PAWS FLX 3M -B

## 2019-06-13 PROCEDURE — 77030026438 HC STYL ET INTUB CARD -A: Performed by: ANESTHESIOLOGY

## 2019-06-13 PROCEDURE — 77030018723 HC ELCTRD BLD COVD -A: Performed by: ORTHOPAEDIC SURGERY

## 2019-06-13 PROCEDURE — 77030018846 HC SOL IRR STRL H20 ICUM -A: Performed by: ORTHOPAEDIC SURGERY

## 2019-06-13 PROCEDURE — 76210000017 HC OR PH I REC 1.5 TO 2 HR: Performed by: ORTHOPAEDIC SURGERY

## 2019-06-13 PROCEDURE — 97116 GAIT TRAINING THERAPY: CPT

## 2019-06-13 PROCEDURE — 76000 FLUOROSCOPY <1 HR PHYS/QHP: CPT

## 2019-06-13 PROCEDURE — 77030036660

## 2019-06-13 PROCEDURE — 77030031139 HC SUT VCRL2 J&J -A: Performed by: ORTHOPAEDIC SURGERY

## 2019-06-13 PROCEDURE — 73501 X-RAY EXAM HIP UNI 1 VIEW: CPT

## 2019-06-13 PROCEDURE — 77030018836 HC SOL IRR NACL ICUM -A: Performed by: ORTHOPAEDIC SURGERY

## 2019-06-13 PROCEDURE — 77010033678 HC OXYGEN DAILY

## 2019-06-13 PROCEDURE — 77030032490 HC SLV COMPR SCD KNE COVD -B: Performed by: ORTHOPAEDIC SURGERY

## 2019-06-13 PROCEDURE — 74011250636 HC RX REV CODE- 250/636: Performed by: PHYSICIAN ASSISTANT

## 2019-06-13 PROCEDURE — 74011250637 HC RX REV CODE- 250/637

## 2019-06-13 DEVICE — IMPLANTABLE DEVICE
Type: IMPLANTABLE DEVICE | Site: HIP | Status: FUNCTIONAL
Brand: NOVATION

## 2019-06-13 DEVICE — COMPONENT HIP PRSS FT CERM ON POLYETH [EXACBSVH1COP] [EXACTECH INC]: Type: IMPLANTABLE DEVICE | Status: FUNCTIONAL

## 2019-06-13 DEVICE — IMPLANTABLE DEVICE
Type: IMPLANTABLE DEVICE | Site: HIP | Status: FUNCTIONAL
Brand: ALTEON

## 2019-06-13 DEVICE — IMPLANTABLE DEVICE
Type: IMPLANTABLE DEVICE | Site: HIP | Status: FUNCTIONAL
Brand: EXACTECH

## 2019-06-13 RX ORDER — SODIUM CHLORIDE 9 MG/ML
125 INJECTION, SOLUTION INTRAVENOUS CONTINUOUS
Status: DISPENSED | OUTPATIENT
Start: 2019-06-13 | End: 2019-06-14

## 2019-06-13 RX ORDER — SODIUM CHLORIDE 0.9 % (FLUSH) 0.9 %
5-40 SYRINGE (ML) INJECTION AS NEEDED
Status: DISCONTINUED | OUTPATIENT
Start: 2019-06-13 | End: 2019-06-13 | Stop reason: HOSPADM

## 2019-06-13 RX ORDER — LIDOCAINE HYDROCHLORIDE 20 MG/ML
INJECTION, SOLUTION EPIDURAL; INFILTRATION; INTRACAUDAL; PERINEURAL AS NEEDED
Status: DISCONTINUED | OUTPATIENT
Start: 2019-06-13 | End: 2019-06-13 | Stop reason: HOSPADM

## 2019-06-13 RX ORDER — MORPHINE SULFATE 2 MG/ML
2 INJECTION, SOLUTION INTRAMUSCULAR; INTRAVENOUS
Status: ACTIVE | OUTPATIENT
Start: 2019-06-13 | End: 2019-06-14

## 2019-06-13 RX ORDER — ATENOLOL 25 MG/1
25 TABLET ORAL DAILY
Status: DISCONTINUED | OUTPATIENT
Start: 2019-06-14 | End: 2019-06-14 | Stop reason: HOSPADM

## 2019-06-13 RX ORDER — FENTANYL CITRATE 50 UG/ML
25 INJECTION, SOLUTION INTRAMUSCULAR; INTRAVENOUS
Status: COMPLETED | OUTPATIENT
Start: 2019-06-13 | End: 2019-06-13

## 2019-06-13 RX ORDER — ONDANSETRON 2 MG/ML
INJECTION INTRAMUSCULAR; INTRAVENOUS AS NEEDED
Status: DISCONTINUED | OUTPATIENT
Start: 2019-06-13 | End: 2019-06-13 | Stop reason: HOSPADM

## 2019-06-13 RX ORDER — PROPOFOL 10 MG/ML
INJECTION, EMULSION INTRAVENOUS AS NEEDED
Status: DISCONTINUED | OUTPATIENT
Start: 2019-06-13 | End: 2019-06-13 | Stop reason: HOSPADM

## 2019-06-13 RX ORDER — KETAMINE HYDROCHLORIDE 100 MG/ML
INJECTION, SOLUTION INTRAMUSCULAR; INTRAVENOUS AS NEEDED
Status: DISCONTINUED | OUTPATIENT
Start: 2019-06-13 | End: 2019-06-13 | Stop reason: HOSPADM

## 2019-06-13 RX ORDER — DIPHENHYDRAMINE HYDROCHLORIDE 50 MG/ML
12.5 INJECTION, SOLUTION INTRAMUSCULAR; INTRAVENOUS AS NEEDED
Status: DISCONTINUED | OUTPATIENT
Start: 2019-06-13 | End: 2019-06-13 | Stop reason: HOSPADM

## 2019-06-13 RX ORDER — OXYCODONE HYDROCHLORIDE 5 MG/1
5 TABLET ORAL
Qty: 80 TAB | Refills: 0 | Status: SHIPPED | OUTPATIENT
Start: 2019-06-13 | End: 2019-06-27

## 2019-06-13 RX ORDER — MORPHINE SULFATE 10 MG/ML
2 INJECTION, SOLUTION INTRAMUSCULAR; INTRAVENOUS
Status: DISCONTINUED | OUTPATIENT
Start: 2019-06-13 | End: 2019-06-13 | Stop reason: HOSPADM

## 2019-06-13 RX ORDER — NEOSTIGMINE METHYLSULFATE 1 MG/ML
INJECTION INTRAVENOUS AS NEEDED
Status: DISCONTINUED | OUTPATIENT
Start: 2019-06-13 | End: 2019-06-13 | Stop reason: HOSPADM

## 2019-06-13 RX ORDER — FENTANYL CITRATE 50 UG/ML
INJECTION, SOLUTION INTRAMUSCULAR; INTRAVENOUS AS NEEDED
Status: DISCONTINUED | OUTPATIENT
Start: 2019-06-13 | End: 2019-06-13 | Stop reason: HOSPADM

## 2019-06-13 RX ORDER — ACETAMINOPHEN 325 MG/1
650 TABLET ORAL ONCE
Status: DISCONTINUED | OUTPATIENT
Start: 2019-06-13 | End: 2019-06-13 | Stop reason: HOSPADM

## 2019-06-13 RX ORDER — ONDANSETRON 4 MG/1
4 TABLET, ORALLY DISINTEGRATING ORAL
Status: DISCONTINUED | OUTPATIENT
Start: 2019-06-15 | End: 2019-06-14 | Stop reason: HOSPADM

## 2019-06-13 RX ORDER — ACETAMINOPHEN 325 MG/1
650 TABLET ORAL EVERY 6 HOURS
Qty: 112 TAB | Refills: 0 | Status: SHIPPED | OUTPATIENT
Start: 2019-06-13 | End: 2019-06-27

## 2019-06-13 RX ORDER — ROPIVACAINE HYDROCHLORIDE 5 MG/ML
30 INJECTION, SOLUTION EPIDURAL; INFILTRATION; PERINEURAL AS NEEDED
Status: DISCONTINUED | OUTPATIENT
Start: 2019-06-13 | End: 2019-06-13 | Stop reason: HOSPADM

## 2019-06-13 RX ORDER — CELECOXIB 200 MG/1
200 CAPSULE ORAL 2 TIMES DAILY
Status: DISCONTINUED | OUTPATIENT
Start: 2019-06-13 | End: 2019-06-14 | Stop reason: HOSPADM

## 2019-06-13 RX ORDER — MIDAZOLAM HYDROCHLORIDE 1 MG/ML
1 INJECTION, SOLUTION INTRAMUSCULAR; INTRAVENOUS AS NEEDED
Status: DISCONTINUED | OUTPATIENT
Start: 2019-06-13 | End: 2019-06-13 | Stop reason: HOSPADM

## 2019-06-13 RX ORDER — SUCCINYLCHOLINE CHLORIDE 20 MG/ML
INJECTION INTRAMUSCULAR; INTRAVENOUS AS NEEDED
Status: DISCONTINUED | OUTPATIENT
Start: 2019-06-13 | End: 2019-06-13 | Stop reason: HOSPADM

## 2019-06-13 RX ORDER — DIPHENHYDRAMINE HCL 12.5MG/5ML
12.5 LIQUID (ML) ORAL
Status: DISCONTINUED | OUTPATIENT
Start: 2019-06-13 | End: 2019-06-14 | Stop reason: HOSPADM

## 2019-06-13 RX ORDER — ONDANSETRON 2 MG/ML
4 INJECTION INTRAMUSCULAR; INTRAVENOUS
Status: ACTIVE | OUTPATIENT
Start: 2019-06-13 | End: 2019-06-14

## 2019-06-13 RX ORDER — GLYCOPYRROLATE 0.2 MG/ML
INJECTION INTRAMUSCULAR; INTRAVENOUS AS NEEDED
Status: DISCONTINUED | OUTPATIENT
Start: 2019-06-13 | End: 2019-06-13 | Stop reason: HOSPADM

## 2019-06-13 RX ORDER — CEFAZOLIN SODIUM/WATER 2 G/20 ML
2 SYRINGE (ML) INTRAVENOUS EVERY 8 HOURS
Status: COMPLETED | OUTPATIENT
Start: 2019-06-13 | End: 2019-06-14

## 2019-06-13 RX ORDER — SODIUM CHLORIDE, SODIUM LACTATE, POTASSIUM CHLORIDE, CALCIUM CHLORIDE 600; 310; 30; 20 MG/100ML; MG/100ML; MG/100ML; MG/100ML
100 INJECTION, SOLUTION INTRAVENOUS CONTINUOUS
Status: DISCONTINUED | OUTPATIENT
Start: 2019-06-13 | End: 2019-06-13 | Stop reason: HOSPADM

## 2019-06-13 RX ORDER — ACETAMINOPHEN 500 MG
1000 TABLET ORAL ONCE
Status: COMPLETED | OUTPATIENT
Start: 2019-06-13 | End: 2019-06-13

## 2019-06-13 RX ORDER — CEFAZOLIN SODIUM IN 0.9 % NACL 2 G/100 ML
PLASTIC BAG, INJECTION (ML) INTRAVENOUS AS NEEDED
Status: DISCONTINUED | OUTPATIENT
Start: 2019-06-13 | End: 2019-06-13 | Stop reason: HOSPADM

## 2019-06-13 RX ORDER — AMOXICILLIN 250 MG
1 CAPSULE ORAL 2 TIMES DAILY
Qty: 30 TAB | Refills: 0 | Status: SHIPPED | OUTPATIENT
Start: 2019-06-13 | End: 2019-06-28

## 2019-06-13 RX ORDER — GUAIFENESIN 100 MG/5ML
81 LIQUID (ML) ORAL 2 TIMES DAILY
Status: DISCONTINUED | OUTPATIENT
Start: 2019-06-13 | End: 2019-06-14 | Stop reason: HOSPADM

## 2019-06-13 RX ORDER — NALOXONE HYDROCHLORIDE 0.4 MG/ML
0.4 INJECTION, SOLUTION INTRAMUSCULAR; INTRAVENOUS; SUBCUTANEOUS AS NEEDED
Status: DISCONTINUED | OUTPATIENT
Start: 2019-06-13 | End: 2019-06-14 | Stop reason: HOSPADM

## 2019-06-13 RX ORDER — SODIUM CHLORIDE 0.9 % (FLUSH) 0.9 %
5-40 SYRINGE (ML) INJECTION EVERY 8 HOURS
Status: DISCONTINUED | OUTPATIENT
Start: 2019-06-13 | End: 2019-06-14 | Stop reason: HOSPADM

## 2019-06-13 RX ORDER — FAMOTIDINE 20 MG/1
20 TABLET, FILM COATED ORAL 2 TIMES DAILY
Status: DISCONTINUED | OUTPATIENT
Start: 2019-06-13 | End: 2019-06-14 | Stop reason: HOSPADM

## 2019-06-13 RX ORDER — DEXAMETHASONE SODIUM PHOSPHATE 4 MG/ML
10 INJECTION, SOLUTION INTRA-ARTICULAR; INTRALESIONAL; INTRAMUSCULAR; INTRAVENOUS; SOFT TISSUE ONCE
Status: COMPLETED | OUTPATIENT
Start: 2019-06-14 | End: 2019-06-14

## 2019-06-13 RX ORDER — SODIUM CHLORIDE, SODIUM LACTATE, POTASSIUM CHLORIDE, CALCIUM CHLORIDE 600; 310; 30; 20 MG/100ML; MG/100ML; MG/100ML; MG/100ML
25 INJECTION, SOLUTION INTRAVENOUS CONTINUOUS
Status: DISCONTINUED | OUTPATIENT
Start: 2019-06-13 | End: 2019-06-13 | Stop reason: HOSPADM

## 2019-06-13 RX ORDER — PHENYLEPHRINE HCL IN 0.9% NACL 0.4MG/10ML
SYRINGE (ML) INTRAVENOUS AS NEEDED
Status: DISCONTINUED | OUTPATIENT
Start: 2019-06-13 | End: 2019-06-13 | Stop reason: HOSPADM

## 2019-06-13 RX ORDER — ACETAMINOPHEN 325 MG/1
650 TABLET ORAL EVERY 6 HOURS
Status: DISCONTINUED | OUTPATIENT
Start: 2019-06-13 | End: 2019-06-14 | Stop reason: HOSPADM

## 2019-06-13 RX ORDER — POLYETHYLENE GLYCOL 3350 17 G/17G
17 POWDER, FOR SOLUTION ORAL DAILY
Status: DISCONTINUED | OUTPATIENT
Start: 2019-06-14 | End: 2019-06-14 | Stop reason: HOSPADM

## 2019-06-13 RX ORDER — LIDOCAINE HYDROCHLORIDE 10 MG/ML
0.1 INJECTION, SOLUTION EPIDURAL; INFILTRATION; INTRACAUDAL; PERINEURAL AS NEEDED
Status: DISCONTINUED | OUTPATIENT
Start: 2019-06-13 | End: 2019-06-13 | Stop reason: HOSPADM

## 2019-06-13 RX ORDER — FACIAL-BODY WIPES
10 EACH TOPICAL DAILY PRN
Status: DISCONTINUED | OUTPATIENT
Start: 2019-06-15 | End: 2019-06-14 | Stop reason: HOSPADM

## 2019-06-13 RX ORDER — SODIUM CHLORIDE 0.9 % (FLUSH) 0.9 %
5-40 SYRINGE (ML) INJECTION EVERY 8 HOURS
Status: DISCONTINUED | OUTPATIENT
Start: 2019-06-13 | End: 2019-06-13 | Stop reason: HOSPADM

## 2019-06-13 RX ORDER — SCOLOPAMINE TRANSDERMAL SYSTEM 1 MG/1
PATCH, EXTENDED RELEASE TRANSDERMAL AS NEEDED
Status: DISCONTINUED | OUTPATIENT
Start: 2019-06-13 | End: 2019-06-13 | Stop reason: HOSPADM

## 2019-06-13 RX ORDER — HYDROMORPHONE HYDROCHLORIDE 1 MG/ML
0.5 INJECTION, SOLUTION INTRAMUSCULAR; INTRAVENOUS; SUBCUTANEOUS
Status: DISCONTINUED | OUTPATIENT
Start: 2019-06-13 | End: 2019-06-13 | Stop reason: HOSPADM

## 2019-06-13 RX ORDER — GABAPENTIN 300 MG/1
600 CAPSULE ORAL 3 TIMES DAILY
Status: DISCONTINUED | OUTPATIENT
Start: 2019-06-13 | End: 2019-06-14 | Stop reason: HOSPADM

## 2019-06-13 RX ORDER — SODIUM CHLORIDE 9 MG/ML
25 INJECTION, SOLUTION INTRAVENOUS CONTINUOUS
Status: DISCONTINUED | OUTPATIENT
Start: 2019-06-13 | End: 2019-06-13 | Stop reason: HOSPADM

## 2019-06-13 RX ORDER — SODIUM CHLORIDE, SODIUM LACTATE, POTASSIUM CHLORIDE, CALCIUM CHLORIDE 600; 310; 30; 20 MG/100ML; MG/100ML; MG/100ML; MG/100ML
INJECTION, SOLUTION INTRAVENOUS
Status: DISCONTINUED | OUTPATIENT
Start: 2019-06-13 | End: 2019-06-13 | Stop reason: HOSPADM

## 2019-06-13 RX ORDER — POLYETHYLENE GLYCOL 3350 17 G/17G
17 POWDER, FOR SOLUTION ORAL DAILY
Qty: 15 PACKET | Refills: 0 | Status: SHIPPED | OUTPATIENT
Start: 2019-06-14 | End: 2019-06-29

## 2019-06-13 RX ORDER — AMOXICILLIN 250 MG
1 CAPSULE ORAL 2 TIMES DAILY
Status: DISCONTINUED | OUTPATIENT
Start: 2019-06-13 | End: 2019-06-14 | Stop reason: HOSPADM

## 2019-06-13 RX ORDER — CEFAZOLIN SODIUM/WATER 2 G/20 ML
2 SYRINGE (ML) INTRAVENOUS ONCE
Status: DISCONTINUED | OUTPATIENT
Start: 2019-06-13 | End: 2019-06-13 | Stop reason: HOSPADM

## 2019-06-13 RX ORDER — ATORVASTATIN CALCIUM 40 MG/1
80 TABLET, FILM COATED ORAL
Status: DISCONTINUED | OUTPATIENT
Start: 2019-06-13 | End: 2019-06-14 | Stop reason: HOSPADM

## 2019-06-13 RX ORDER — ONDANSETRON 2 MG/ML
4 INJECTION INTRAMUSCULAR; INTRAVENOUS AS NEEDED
Status: DISCONTINUED | OUTPATIENT
Start: 2019-06-13 | End: 2019-06-13 | Stop reason: HOSPADM

## 2019-06-13 RX ORDER — EPHEDRINE SULFATE/0.9% NACL/PF 50 MG/5 ML
SYRINGE (ML) INTRAVENOUS AS NEEDED
Status: DISCONTINUED | OUTPATIENT
Start: 2019-06-13 | End: 2019-06-13 | Stop reason: HOSPADM

## 2019-06-13 RX ORDER — PREGABALIN 75 MG/1
75 CAPSULE ORAL ONCE
Status: COMPLETED | OUTPATIENT
Start: 2019-06-13 | End: 2019-06-13

## 2019-06-13 RX ORDER — FENTANYL CITRATE 50 UG/ML
50 INJECTION, SOLUTION INTRAMUSCULAR; INTRAVENOUS AS NEEDED
Status: DISCONTINUED | OUTPATIENT
Start: 2019-06-13 | End: 2019-06-13 | Stop reason: HOSPADM

## 2019-06-13 RX ORDER — GUAIFENESIN 100 MG/5ML
81 LIQUID (ML) ORAL 2 TIMES DAILY
Qty: 60 TAB | Refills: 0 | Status: SHIPPED | OUTPATIENT
Start: 2019-06-13 | End: 2019-07-13

## 2019-06-13 RX ORDER — FAMOTIDINE 20 MG/1
20 TABLET, FILM COATED ORAL 2 TIMES DAILY
Qty: 60 TAB | Refills: 0 | Status: SHIPPED | OUTPATIENT
Start: 2019-06-13 | End: 2019-07-13

## 2019-06-13 RX ORDER — OXYCODONE HYDROCHLORIDE 5 MG/1
10 TABLET ORAL
Status: DISCONTINUED | OUTPATIENT
Start: 2019-06-13 | End: 2019-06-14 | Stop reason: HOSPADM

## 2019-06-13 RX ORDER — OXYCODONE HYDROCHLORIDE 5 MG/1
5 TABLET ORAL
Status: DISCONTINUED | OUTPATIENT
Start: 2019-06-13 | End: 2019-06-14 | Stop reason: HOSPADM

## 2019-06-13 RX ORDER — MIDAZOLAM HYDROCHLORIDE 1 MG/ML
0.5 INJECTION, SOLUTION INTRAMUSCULAR; INTRAVENOUS
Status: DISCONTINUED | OUTPATIENT
Start: 2019-06-13 | End: 2019-06-13 | Stop reason: HOSPADM

## 2019-06-13 RX ORDER — ROCURONIUM BROMIDE 10 MG/ML
INJECTION, SOLUTION INTRAVENOUS AS NEEDED
Status: DISCONTINUED | OUTPATIENT
Start: 2019-06-13 | End: 2019-06-13 | Stop reason: HOSPADM

## 2019-06-13 RX ORDER — SODIUM CHLORIDE 0.9 % (FLUSH) 0.9 %
5-40 SYRINGE (ML) INJECTION AS NEEDED
Status: DISCONTINUED | OUTPATIENT
Start: 2019-06-13 | End: 2019-06-14 | Stop reason: HOSPADM

## 2019-06-13 RX ORDER — ALLOPURINOL 100 MG/1
100 TABLET ORAL DAILY
Status: DISCONTINUED | OUTPATIENT
Start: 2019-06-14 | End: 2019-06-14 | Stop reason: HOSPADM

## 2019-06-13 RX ORDER — CELECOXIB 200 MG/1
400 CAPSULE ORAL ONCE
Status: COMPLETED | OUTPATIENT
Start: 2019-06-13 | End: 2019-06-13

## 2019-06-13 RX ADMIN — GABAPENTIN 600 MG: 300 CAPSULE ORAL at 22:11

## 2019-06-13 RX ADMIN — KETAMINE HYDROCHLORIDE 50 MG: 100 INJECTION, SOLUTION INTRAMUSCULAR; INTRAVENOUS at 08:32

## 2019-06-13 RX ADMIN — FENTANYL CITRATE 25 MCG: 50 INJECTION, SOLUTION INTRAMUSCULAR; INTRAVENOUS at 11:00

## 2019-06-13 RX ADMIN — PROPOFOL 100 MG: 10 INJECTION, EMULSION INTRAVENOUS at 09:19

## 2019-06-13 RX ADMIN — PREGABALIN 75 MG: 75 CAPSULE ORAL at 07:45

## 2019-06-13 RX ADMIN — FAMOTIDINE 20 MG: 20 TABLET ORAL at 17:27

## 2019-06-13 RX ADMIN — Medication 10 MG: at 09:58

## 2019-06-13 RX ADMIN — Medication 2 G: at 17:23

## 2019-06-13 RX ADMIN — SODIUM CHLORIDE 125 ML/HR: 900 INJECTION, SOLUTION INTRAVENOUS at 10:52

## 2019-06-13 RX ADMIN — FAMOTIDINE 20 MG: 20 TABLET ORAL at 13:52

## 2019-06-13 RX ADMIN — PROPOFOL 100 MG: 10 INJECTION, EMULSION INTRAVENOUS at 09:23

## 2019-06-13 RX ADMIN — FENTANYL CITRATE 25 MCG: 50 INJECTION, SOLUTION INTRAMUSCULAR; INTRAVENOUS at 11:20

## 2019-06-13 RX ADMIN — CELECOXIB 200 MG: 200 CAPSULE ORAL at 20:49

## 2019-06-13 RX ADMIN — SODIUM CHLORIDE, SODIUM LACTATE, POTASSIUM CHLORIDE, CALCIUM CHLORIDE: 600; 310; 30; 20 INJECTION, SOLUTION INTRAVENOUS at 09:59

## 2019-06-13 RX ADMIN — FENTANYL CITRATE 25 MCG: 50 INJECTION, SOLUTION INTRAMUSCULAR; INTRAVENOUS at 11:10

## 2019-06-13 RX ADMIN — Medication 40 MCG: at 08:34

## 2019-06-13 RX ADMIN — HYDROMORPHONE HYDROCHLORIDE 0.5 MG: 1 INJECTION, SOLUTION INTRAMUSCULAR; INTRAVENOUS; SUBCUTANEOUS at 11:45

## 2019-06-13 RX ADMIN — SENNOSIDES,DOCUSATE SODIUM 1 TABLET: 8.6; 5 TABLET, FILM COATED ORAL at 13:52

## 2019-06-13 RX ADMIN — Medication 10 MG: at 09:04

## 2019-06-13 RX ADMIN — ATORVASTATIN CALCIUM 80 MG: 40 TABLET, FILM COATED ORAL at 22:11

## 2019-06-13 RX ADMIN — LIDOCAINE HYDROCHLORIDE 100 MG: 20 INJECTION, SOLUTION EPIDURAL; INFILTRATION; INTRACAUDAL; PERINEURAL at 08:32

## 2019-06-13 RX ADMIN — ASPIRIN 81 MG 81 MG: 81 TABLET ORAL at 17:28

## 2019-06-13 RX ADMIN — ACETAMINOPHEN 1000 MG: 500 TABLET ORAL at 07:46

## 2019-06-13 RX ADMIN — ROCURONIUM BROMIDE 20 MG: 10 INJECTION, SOLUTION INTRAVENOUS at 08:36

## 2019-06-13 RX ADMIN — ROCURONIUM BROMIDE 10 MG: 10 INJECTION, SOLUTION INTRAVENOUS at 08:32

## 2019-06-13 RX ADMIN — Medication 120 MCG: at 08:54

## 2019-06-13 RX ADMIN — GABAPENTIN 600 MG: 300 CAPSULE ORAL at 17:23

## 2019-06-13 RX ADMIN — OXYCODONE HYDROCHLORIDE 5 MG: 5 TABLET ORAL at 19:01

## 2019-06-13 RX ADMIN — FENTANYL CITRATE 100 MCG: 50 INJECTION, SOLUTION INTRAMUSCULAR; INTRAVENOUS at 08:24

## 2019-06-13 RX ADMIN — GLYCOPYRROLATE 0.4 MG: 0.2 INJECTION INTRAMUSCULAR; INTRAVENOUS at 10:20

## 2019-06-13 RX ADMIN — Medication 10 ML: at 13:53

## 2019-06-13 RX ADMIN — FENTANYL CITRATE 25 MCG: 50 INJECTION, SOLUTION INTRAMUSCULAR; INTRAVENOUS at 10:50

## 2019-06-13 RX ADMIN — SCOLOPAMINE TRANSDERMAL SYSTEM 1 PATCH: 1 PATCH, EXTENDED RELEASE TRANSDERMAL at 08:24

## 2019-06-13 RX ADMIN — ACETAMINOPHEN 650 MG: 325 TABLET ORAL at 17:27

## 2019-06-13 RX ADMIN — FENTANYL CITRATE 50 MCG: 50 INJECTION, SOLUTION INTRAMUSCULAR; INTRAVENOUS at 09:26

## 2019-06-13 RX ADMIN — SUCCINYLCHOLINE CHLORIDE 100 MG: 20 INJECTION INTRAMUSCULAR; INTRAVENOUS at 08:32

## 2019-06-13 RX ADMIN — SODIUM CHLORIDE, SODIUM LACTATE, POTASSIUM CHLORIDE, CALCIUM CHLORIDE: 600; 310; 30; 20 INJECTION, SOLUTION INTRAVENOUS at 08:16

## 2019-06-13 RX ADMIN — Medication 2 G: at 08:36

## 2019-06-13 RX ADMIN — OXYCODONE HYDROCHLORIDE 10 MG: 5 TABLET ORAL at 22:11

## 2019-06-13 RX ADMIN — NEOSTIGMINE METHYLSULFATE 3 MG: 1 INJECTION INTRAVENOUS at 10:20

## 2019-06-13 RX ADMIN — Medication 80 MCG: at 08:38

## 2019-06-13 RX ADMIN — SODIUM CHLORIDE 125 ML/HR: 900 INJECTION, SOLUTION INTRAVENOUS at 19:01

## 2019-06-13 RX ADMIN — Medication 10 ML: at 22:11

## 2019-06-13 RX ADMIN — ASPIRIN 81 MG 81 MG: 81 TABLET ORAL at 13:52

## 2019-06-13 RX ADMIN — SENNOSIDES,DOCUSATE SODIUM 1 TABLET: 8.6; 5 TABLET, FILM COATED ORAL at 17:28

## 2019-06-13 RX ADMIN — Medication 10 MG: at 09:01

## 2019-06-13 RX ADMIN — SODIUM CHLORIDE, SODIUM LACTATE, POTASSIUM CHLORIDE, AND CALCIUM CHLORIDE 25 ML/HR: 600; 310; 30; 20 INJECTION, SOLUTION INTRAVENOUS at 07:15

## 2019-06-13 RX ADMIN — CELECOXIB 400 MG: 200 CAPSULE ORAL at 07:45

## 2019-06-13 RX ADMIN — ACETAMINOPHEN 650 MG: 325 TABLET ORAL at 13:52

## 2019-06-13 RX ADMIN — ONDANSETRON 4 MG: 2 INJECTION INTRAMUSCULAR; INTRAVENOUS at 09:59

## 2019-06-13 NOTE — PERIOP NOTES
Handoff Report from Operating Room to PACU    Report received from Analilia Wilson RN and Prosper Gregg CRNA regarding Anshul Rothman. Surgeon(s):  Guido Figueroa MD  And Procedure(s) (LRB):  RIGHT TOTAL HIP ARTHROPLASTY - ANTERIOR APPROACH WITH NAVIGATION (Right)  confirmed   with dressings discussed. Anesthesia type, drugs, patient history, complications, estimated blood loss, vital signs, intake and output, and last pain medication, lines and temperature were reviewed.

## 2019-06-13 NOTE — BRIEF OP NOTE
BRIEF OPERATIVE NOTE    Date of Procedure: 6/13/2019   Preoperative Diagnosis: RIGHT HIP OSTEOARTHRITIS  Postoperative Diagnosis: RIGHT HIP OSTEOARTHRITIS    Procedure(s):  RIGHT TOTAL HIP ARTHROPLASTY - ANTERIOR APPROACH WITH NAVIGATION  Surgeon(s) and Role:     Danika Jackson MD - Primary         Surgical Assistant: Felix Roberts PA-C    Surgical Staff:  Circ-1: Cira Fernandezet: Eduardo Fuentes RN  Physician Assistant: MICHELLE Wyatt  Radiology Technician: Mahnaz Brown  Scrub Tech-1: Bandar Hickman  Surg Asst-1: Theoplis Speak  Event Time In Time Out   Incision Start 6988    Incision Close 1012      Anesthesia: General   Estimated Blood Loss: 150  Specimens:   ID Type Source Tests Collected by Time Destination   1 : Right Femoral Head Bone Hip, right  Chris Sullivan MD 6/13/2019 0934 Discarded      Findings: n/a   Complications: none  Implants:   Implant Name Type Inv.  Item Serial No.  Lot No. LRB No. Used Action   LINER ACET NEUT 36MM GRP 3 -- NOVATION - [id number]  LINER ACET NEUT 36MM GRP 3 -- NOVATION [id number] [employer] NA Right 1 Implanted   SHELL ACET M-HLE 56MM GRA -- INTEGRIP REV - [id number]  SHELL ACET M-HLE 56MM GRA -- INTEGRIP REV [id number] [employer] NA Right 1 Implanted   Bone Screw 6.5x30   [id number] [employer] NA Right 1 Implanted   STEM [facility] W/HA STD OFFST SZ13 -- COLLARED - [id number]  STEM [facility] W/DURAN STD OFFST KS63 -- COLLARED [id number] [employer] NA Right 1 Implanted   HEAD FEM CER 36MM OD +0MM -- BIOLOX DELTA - [id number]  HEAD FEM CER 36MM OD +0MM -- BIOLOX DELTA [id number] [employer] NA Right 1 Implanted

## 2019-06-13 NOTE — PROGRESS NOTES
OT order acknowledged. Patient is post op day 0 and will be staying overnight. OT will see tomorrow morning for eval and ADL needs.

## 2019-06-13 NOTE — OP NOTES
Diedra Osgood MD - Adult Reconstruction and Total Joint Replacement    Anshul Rothman - MRN [id number] - : 1940 (78 y.o.)      Date: 2019    Pre-operative Diagnosis: Right Hip DJD     Post-operative Diagnosis: same     Procedure:  (1) Right Total Hip Arthroplasty, Direct Anterior Approach    (2) Intraoperative Fluoroscopy    (3) Imageless Computer Navigation     Implants Used:   Implant Name Type Inv. Item Serial No.  Lot No. LRB No. Used Action   LINER ACET NEUT 36MM GRP 3 -- NOVATION - [id number]  LINER ACET NEUT 36MM GRP 3 -- NOVATION [id number] [employer] NA Right 1 Implanted   SHELL ACET M-HLE 56MM GRA -- INTEGRIP REV - [id number]  SHELL ACET M-HLE 56MM GRA -- INTEGRIP REV [id number] [employer] NA Right 1 Implanted   Bone Screw 6.5x30   [id number] [employer] NA Right 1 Implanted   STEM ELMNT W/HA STD OFFST SZ13 -- COLLARED - [id number]  STEM ELMNT W/HA STD OFFST SE05 -- COLLARED [id number] [employer] NA Right 1 Implanted   HEAD FEM CER 36MM OD +0MM -- BIOLOX DELTA - [id number]  HEAD FEM CER 36MM OD +0MM -- Hal Canales [id number] [employer] NA Right 1 Implanted       Anesthesia: GETA + local    Surgeon: Diedra Osgood     Assist: MICHELLE Colon (Performing all or most of the following assistant-at-surgery services including but not limited to: proper patient positioning, sterile/prep and draping, placement of instruments/trackers, operative exposure, minor portions of bone / soft tissue excision, final irrigation and debridement, deep and superficial closure, application of final dressings)    EBL: 300cc     Drains: none     Specimens: none     Complications: none     Condition: stable to PACU     Brief History: Longstanding hip pain, unresponsive to conservative treatment. The risks, benefits, and alternatives to total hip replacement were thoroughly explained. The patient understood no guarantees could be given about the outcome of the procedure and wished to proceed.      Description of Procedure: After being identified in the preoperative holding area and having their operative site marked, the patient was brought back to the operating room where they underwent anesthesia to good effect. They were  placed in the supine position with a bump under the hip. The operative extremity was then prepped and draped in the usual fashion using sterile technique. Preoperative antibiotics had been administered. An appropriate time-out was performed. We began by placing the pelvic tracker with two percutaneous Shanz pins into the ipsilateral ilium. The pelvis was then registered with the navigation system. An incision was made 2 fingerbreadths lateral and distal to the ASIS. The skin flaps were developed down to the tensor fascia. This was divided sharply. Blunt dissection was taken medially over the tensor muscle. Retractors were placed superior and inferior to the femoral neck. The anterior fat pad was debrided. Circumflex vessels were identified and cauterized. A provisional neck cut was performed. The head was removed from the acetabulum. We then excised the labrum. We sequentially reamed for the acetabular shell. This was placed in the appropriate abduction and version. Position was confirmed by navigation and fluoroscopy . The liner was then impacted into the locking mechanism. We then turned our attention to the femur. Elevators were used to gain access to the proximal femur. Soft tissue releases were performed as necessary. The lateralizer was utilized. We then sequentially broached up to the final size trial. We placed a trial neck and head and had excellent restoration of leg length and offset with good soft tissue balance. We selected these as our final implants. Final components were inserted and the hip was reduced after thorough lavage. Restoration of appropriate leg length was confirmed by navigation and fluoroscopy. We again irrigated. The tensor fascia was closed.  Periarticular injection was performed. Skin closure was performed in layers. A sterile dressing was applied. The patient was awakened, moved to the stretcher, and taken to the recovery room in stable condition. At the conclusion of the procedure, all counts were correct. There were no immediate complications. Additional Procedure:   Date: 6/13/2019    Preoperative diagnosis: RIGHT HIP OSTEOARTHRITIS    Postoperative diagnosis: RIGHT HIP OSTEOARTHRITIS    Procedure performed: Procedure(s):  RIGHT TOTAL HIP ARTHROPLASTY - ANTERIOR APPROACH WITH NAVIGATION    Anesthesia: General    Surgeon(s) and Role:     Tracy Power MD - Primary      This describes the use of intraoperative fluoroscopy. Fluoroscopic imaging was utilized in orthogonal planes as well as using live technique for all phases of the procedure, described separately in the operative report, including hardware placement.     Dm Shields MD

## 2019-06-13 NOTE — ANESTHESIA PREPROCEDURE EVALUATION
Relevant Problems   No relevant active problems       Anesthetic History   No history of anesthetic complications  PONV          Review of Systems / Medical History  Patient summary reviewed, nursing notes reviewed and pertinent labs reviewed    Pulmonary  Within defined limits      Sleep apnea           Neuro/Psych   Within defined limits           Cardiovascular    Hypertension    Angina      CAD, cardiac stents and CABG    Exercise tolerance: >4 METS     GI/Hepatic/Renal  Within defined limits              Endo/Other  Within defined limits  Diabetes    Arthritis     Other Findings              Physical Exam    Airway  Mallampati: II  TM Distance: > 6 cm  Neck ROM: normal range of motion   Mouth opening: Normal     Cardiovascular  Regular rate and rhythm,  S1 and S2 normal,  no murmur, click, rub, or gallop             Dental  No notable dental hx       Pulmonary  Breath sounds clear to auscultation               Abdominal  GI exam deferred       Other Findings            Anesthetic Plan    ASA: 3  Anesthesia type: general    Monitoring Plan: BIS      Induction: Intravenous  Anesthetic plan and risks discussed with: Patient

## 2019-06-13 NOTE — H&P
Cam Mancini MD - Adult Reconstruction and Total Joint Replacement     Orthopaedic History and Physical        NAME: Wong Szymanski       :  1940       MRN:  [id number]        Subjective:   Patient ID: Wong Szymanski is a 66 y.o. male. Chief Complaint: Pain of the right hip and knee    He presents for evaluation of alternating right and hip pain and discussion of elective joint replacement. His pain is waxing and waning, severe at worst, exacerbated with lying down, he has been sleeping sitting in a recliner instead of in a bed as pain is severe enough to interfere with sleep. Previous hip and knee corticosteroid injections have both provided several days of good relief of his pain in general. His hip pain is localized to the groin, over the counter topical medications have not provided any relief of pain. No complaints of the contralateral side, previous history of L JONN in , he notes history of several days of altered mental status following surgery. 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       Patient Active Problem List    Diagnosis Date Noted    HTN (hypertension) 2019    Pure hypercholesterolemia 2019    Hx of gout 2019    Hx of colonic polyps 2019    Coronary artery disease involving native coronary artery of native heart without angina pectoris 2019    Rosacea 2019    Idiopathic peripheral neuropathy 2019     Past Medical History:   Diagnosis Date    Adverse effect of anesthesia     hallucinations/agitation after last surgery 2018 in hospital 3 days    Arthritis     knee and hip/right    CAD (coronary artery disease)     Diabetes ([facility] [address]     Hgb A1C 6.1 2019 - No meds, dx Pre-DM being monitored by PCP    Hypercholesteremia     Hypertension     Nausea & vomiting     Sleep apnea     Borderline/ No CPAP      Past Surgical History:   Procedure Laterality Date    CARDIAC SURG PROCEDURE UNLIST  03/2001    Bypass    CARDIAC SURG PROCEDURE UNLIST  2008    stents    HX APPENDECTOMY      HX CORONARY ARTERY BYPASS GRAFT  2001    5v    HX GI      umbilical hernia x3    HX HIP REPLACEMENT Left 2017    HX KNEE ARTHROSCOPY Right 2007    right knee    TOTAL KNEE ARTHROPLASTY Left 2017    left hip replacement      Prior to Admission medications    Medication Sig Start Date End Date Taking? Authorizing Provider   zolpidem (AMBIEN) 10 mg tablet Take 5 mg by mouth nightly as needed for Sleep. Yes Provider, Historical   traMADol (ULTRAM) 50 mg tablet Take 50 mg by mouth every six (6) hours as needed for Pain. Yes Provider, Historical   gabapentin (NEURONTIN) 300 mg capsule Take 2 Caps by mouth three (3) times daily. 6/3/19  Yes Liat Lim MD   aspirin delayed-release 81 mg tablet Take  by mouth daily. Yes Provider, Historical   multivitamin (ONE A DAY) tablet Take 1 Tab by mouth daily. Yes Provider, Historical   omega 3-dha-epa-fish oil (FISH OIL) 100-160-1,000 mg cap Take  by mouth two (2) times a day. 2 tabs bid   Yes Provider, Historical   pyridoxine, vitamin B6, (VITAMIN B-6) 100 mg tablet Take 100 mg by mouth daily. Yes Provider, Historical   cholecalciferol (VITAMIN D3) 1,000 unit cap Take  by mouth daily. Yes Provider, Historical   atorvastatin (LIPITOR) 80 mg tablet Take 1 Tab by mouth daily. Patient taking differently: Take 80 mg by mouth nightly. 5/28/19  Yes Liat Lim MD   losartan (COZAAR) 50 mg tablet Take 1 Tab by mouth daily. 5/28/19  Yes Liat Lim MD   allopurinol (ZYLOPRIM) 100 mg tablet Take 1 Tab by mouth daily. 5/28/19  Yes Liat Lim MD   atenolol (TENORMIN) 25 mg tablet Take 1 Tab by mouth daily. Patient taking differently: Take 25 mg by mouth nightly. 5/28/19  Yes Liat Lim MD   HYDROcodone-acetaminophen [facility]) 5-325 mg per tablet Take 1 Tab by mouth nightly.     Provider, Historical     No Known Allergies   Social History     Tobacco Use    Smoking status: Never Smoker    Smokeless tobacco: Never Used   Substance Use Topics    Alcohol use: Yes     Alcohol/week: 1.8 oz     Types: 3 Cans of beer per week     Frequency: 2-3 times a week     Drinks per session: 1 or 2     Binge frequency: Never      Family History   Problem Relation Age of Onset    Cancer Mother         REVIEW OF SYSTEMS: A comprehensive review of systems was negative except for that written in the HPI. Objective:   Constitutional: He appears stated age. Pt is cooperative and is in no acute distress. Well nourished. Well developed. Body habitus is overweight. Body mass index is 28.7 kg/m². Gait is antalgic. Assistive devices: cane  Eyes:  Sclera are nonicteric. Respiratory:  No labored breathing. Cardiovascular:  No marked edema. Well perfused extremities bilaterally. Skin:  No marked skin ulcers. No lymphedema or skin abnormalities. Neurological:  No marked sensory loss noted. Grossly neurovascularly intact. Both lower extremities are intact to distal sensory and motor function. Psychiatric: Alert and oriented x3. MUSCULOSKELETAL: Lumbar spine is nonfocal. Some pain with PROM of R hip, especially IR. No obvious LLD. Medial and patellofemoral pain under load and palpation. No instability. 5/5 BLE strength. Radiographs:       X-ray Knee Bilateral Ap Standing (47259)    Result Date: 5/2/2019  Standing. Views: Elyse Zambrano. Impression: The right knee has severe tricompartmental degenerative changes, most affected medially with bone on bone articulation. Similar but slightly less severe changes on the contralateral side. Assessment:     ICD-10-CM   1. Localized osteoarthritis of right knee M17.11       Plan: At this time, I recommended R JONN. Conservative treatments including activity modification, medication, and steroid injections have not successfully relieved pain. Surgery was discussed at length with the pt today.  We went over all the pertinent risks, benefits, and alternatives to the procedure. They understand no guarantees can be made about the outcome and they would like to proceed. I discussed the pre-op total joint class and general recovery timeline with the pt. The pt understands the need for medical clearance. We will plan for the JONN in 6-7 weeks.          Scribed for Dr. Violette Su by MICHELLE Ny

## 2019-06-13 NOTE — ROUTINE PROCESS
Patient: Bryce Rivera MRN: [id number]  SSN: [id number]   YOB: 1940  Age: 78 y.o. Sex: male     Patient is status post Procedure(s):  RIGHT TOTAL HIP ARTHROPLASTY - ANTERIOR APPROACH WITH NAVIGATION. Surgeon(s) and Role:     Adali Estes MD - Primary    Local/Dose/Irrigation: 50mL of Ropivicaine mixed with 50mL of 0.9% NS, for a total of 100mL. Peripheral IV 06/13/19 Distal;Left Wrist (Active)   Site Assessment Clean, dry, & intact 6/13/2019  7:24 AM   Phlebitis Assessment 0 6/13/2019  7:24 AM   Infiltration Assessment 0 6/13/2019  7:24 AM   Dressing Status Clean, dry, & intact 6/13/2019  7:24 AM   Dressing Type Tape;Transparent 6/13/2019  7:24 AM   Hub Color/Line Status Green; Infusing 6/13/2019  7:24 AM            Airway - Endotracheal Tube 06/13/19 Oral (Active)   Line Filiberto Lips 6/13/2019 12:00 AM                   Dressing/Packing:  Wound Hip Right 1 Incision site and 1 Navigation puncture site. -Dressing Type: Topical skin adhesive/glue; Other (Comment)(Exofin and Honeycomb) (06/13/19 5862)    Splint/Cast:  ]    Other:

## 2019-06-13 NOTE — PROGRESS NOTES
Problem: Mobility Impaired (Adult and Pediatric)  Goal: *Acute Goals and Plan of Care (Insert Text)  Description  Physical Therapy Goals  Initiated 6/13/2019    1. Patient will move from supine to sit and sit to supine , scoot up and down and roll side to side in bed with modified independence within 4 days. 2. Patient will perform sit to stand with modified independence within 4 days. 3. Patient will ambulate with modified independence for 450 feet with the least restrictive device within 4 days. 4. Patient will ascend/descend 4 stairs with 1 handrail(s) with supervision/set-up within 4 days. 5. Patient will verbalize and demonstrate understanding of Anterior THR precautions per protocol within 4 days. 6. Patient will perform THR home exercise program per protocol with modified independence within 4 days. Outcome: Progressing Towards Goal   PHYSICAL THERAPY EVALUATION  Patient: Hans Hanson (84 y.o. male)  Date: 6/13/2019  Primary Diagnosis: S/P hip replacement [Z96.649]  Procedure(s) (LRB):  RIGHT TOTAL HIP ARTHROPLASTY - ANTERIOR APPROACH WITH NAVIGATION (Right) Day of Surgery   Precautions: Total hip(R anterior THR)    ASSESSMENT :  Based on the objective data described below, the patient presents with expected increased R hip pain, decreased strength/ROM, decreased standing balance and mobility skills following R THR today. He was lying in the bed with spouse/family in the room when PT arrived. Agreed to therapy and cleared by his nurse for mobilization. PTA - lives with spouse in a 1 story home with no steps to enter. Has needed a RW the last week due to increased R hip/knee pain. Unable to sleep in bed due to R hip pain and uses recliner instead. Was independent with all ADLs and mobility skills, but limited by pain with standing and walking. No falls reported.   Reviewed anterior THR precautions and started on protocol exercises - needed min a for RLE with heel slides and verbal/tactile cues to improve performance; provided sheet for exercises as reference. Vitals were monitored in supine, sitting and standing - remained stable throughout session. Currently needed mod a x 1 for supine to sit to supine transfers due to increased R hip pain. Sit to standing was min a x 1. Gait limited to 90 feet with walker and cg/min a x 1. Pattern was antalgic, slow and decreased step lengths. Once back in bed, reapplied SCDs and Ice pack to R hip. He was resting comfortably when the session ended. Recommend  PT upon discharge. Patient will benefit from skilled intervention to address the above impairments. Patient?s rehabilitation potential is considered to be Excellent  Factors which may influence rehabilitation potential include:   ? None noted  ? Mental ability/status  ? Medical condition  ? Home/family situation and support systems  ? Safety awareness  ? Pain tolerance/management  ? Other:      PLAN :  Recommendations and Planned Interventions:  ?           Bed Mobility Training             ? Neuromuscular Re-Education  ? Transfer Training                   ? Orthotic/Prosthetic Training  ? Gait Training                         ? Modalities  ? Therapeutic Exercises           ? Edema Management/Control  ? Therapeutic Activities            ? Patient and Family Training/Education  ? Other (comment):    Frequency/Duration: Patient will be followed by physical therapy  twice daily to address goals. Discharge Recommendations: Home Health PT  Further Equipment Recommendations for Discharge: None - has walker and cane at home      SUBJECTIVE:   Patient stated ? I'm having very little nausea which is better than last time. ?    OBJECTIVE DATA SUMMARY:   HISTORY:    Past Medical History:   Diagnosis Date    Adverse effect of anesthesia     hallucinations/agitation after last surgery 2018 in hospital 3 days    Arthritis     knee and hip/right    CAD (coronary artery disease) 2001    Diabetes ([facility] [address]     Hgb A1C 6.1 5/2019 - No meds, dx Pre-DM being monitored by PCP    Hypercholesteremia     Hypertension     Nausea & vomiting     Sleep apnea     Borderline/ No CPAP     Past Surgical History:   Procedure Laterality Date    CARDIAC SURG PROCEDURE UNLIST  03/2001    Bypass    CARDIAC SURG PROCEDURE UNLIST  2008    stents    HX APPENDECTOMY      HX CORONARY ARTERY BYPASS GRAFT  2001    5v    HX GI      umbilical hernia x3    HX HIP REPLACEMENT Left 2017    HX KNEE ARTHROSCOPY Right 2007    right knee    TOTAL KNEE ARTHROPLASTY Left 2017    left hip replacement     Prior Level of Function/Home Situation: lives with spouse in a 1 story home with no steps to enter. Has needed a RW the last week due to increased R hip/knee pain. Unable to sleep in bed due to R hip pain and uses recliner instead. Was independent with all ADLs and mobility skills, but limited by pain with standing and walking. No falls reported. Personal factors and/or comorbidities impacting plan of care: None    Home Situation  Home Environment: Private residence  # Steps to Enter: 0  Wheelchair Ramp: No  One/Two Story Residence: One story  Living Alone: No  Support Systems: Spouse/Significant Other/Partner, Family member(s)  Patient Expects to be Discharged to[de-identified] Private residence  Current DME Used/Available at Home: [employer], straight, Walker, rolling    EXAMINATION/PRESENTATION/DECISION MAKING:   Critical Behavior:  Neurologic State: Drowsy  Orientation Level: Oriented X4  Cognition: Appropriate decision making, Appropriate for age attention/concentration, Appropriate safety awareness, Follows commands  Safety/Judgement: Awareness of environment, Good awareness of safety precautions, Fall prevention  Hearing:   Auditory  Auditory Impairment: Hard of hearing, bilateral  Hearing Aids/Status: Bilateral  Skin:    Edema:   Range Of Motion:  AROM: Generally decreased, functional           PROM: Generally decreased, functional(limited by R hip pain)           Strength:    Strength: Generally decreased, functional(R hip 2-/5; R knee 3-/5; R ankle 3+/5; 4+/5 otherwise)                    Tone & Sensation:   Tone: Normal              Sensation: Impaired(idiopathic peripheral neuropathy)               Coordination:  Coordination: Within functional limits  Vision:      Functional Mobility:  Bed Mobility:  Rolling: Minimum assistance  Supine to Sit: Moderate assistance;Assist x1  Sit to Supine: Moderate assistance;Assist x1  Scooting: Contact guard assistance  Transfers:  Sit to Stand: Minimum assistance;Assist x1  Stand to Sit: Contact guard assistance                       Balance:   Sitting: Intact  Standing: Impaired  Standing - Static: Good;Constant support  Standing - Dynamic : Fair;Constant support  Ambulation/Gait Training:  Distance (ft): 90 Feet (ft)  Assistive Device: Walker, rolling;Gait belt  Ambulation - Level of Assistance: Contact guard assistance;Minimal assistance;Assist x1     Gait Description (WDL): Exceptions to WDL  Gait Abnormalities: Antalgic;Trunk sway increased  Right Side Weight Bearing: As tolerated  Left Side Weight Bearing: Full  Base of Support: Widened  Stance: Right decreased  Speed/Wendy: Slow  Step Length: Right shortened;Left shortened                    Therapeutic Exercises:   Supine - ankle pumps, qs, hs, ir of hips - all x 10 reps each; tolerated well    Functional Measure:  Barthel Index:    Bathin  Bladder: 10  Bowels: 10  Groomin  Dressin  Feeding: 10  Mobility: 0  Stairs: 0  Toilet Use: 5  Transfer (Bed to Chair and Back): 10  Total: 55/100       Percentage of impairment   0%   1-19%   20-39%   40-59%   60-79%   80-99%   100%   Barthel Score 0-100 100 99-80 79-60 59-40 20-39 1-19   0     The Barthel ADL Index: Guidelines  1.  The index should be used as a record of what a patient does, not as a record of what a patient could do. 2. The main aim is to establish degree of independence from any help, physical or verbal, however minor and for whatever reason. 3. The need for supervision renders the patient not independent. 4. A patient's performance should be established using the best available evidence. Asking the patient, friends/relatives and nurses are the usual sources, but direct observation and common sense are also important. However direct testing is not needed. 5. Usually the patient's performance over the preceding 24-48 hours is important, but occasionally longer periods will be relevant. 6. Middle categories imply that the patient supplies over 50 per cent of the effort. 7. Use of aids to be independent is allowed. Alli Hutton., Barthel, D.W. (9911). Functional evaluation: the Barthel Index. [address] [facility] (14)2. Shayla Javed stoney LESVIA [place], Laura Oro., Chetan Iniguez., [place], [address] [facility] (1999). Measuring the change indisability after inpatient rehabilitation; comparison of the responsiveness of the Barthel Index and Functional [place] Measure. Journal of Neurology, Neurosurgery, and Psychiatry, 66(4), 457-117. Jacqueline Jones, N.J.A, LINDSEY Plummer, & Zhou Estrada, M.A. (2004.) Assessment of post-stroke quality of life in cost-effectiveness studies: The usefulness of the Barthel Index and the EuroQoL-5D.  Quality of Life Research, 15, 189-65        Physical Therapy Evaluation Charge Determination   History Examination Presentation Decision-Making   HIGH Complexity :3+ comorbidities / personal factors will impact the outcome/ POC  HIGH Complexity : 4+ Standardized tests and measures addressing body structure, function, activity limitation and / or participation in recreation  MEDIUM Complexity : Evolving with changing characteristics  Other outcome measures Barthel  MEDIUM      Based on the above components, the patient evaluation is determined to be of the following complexity level: MEDIUM    Pain:  Pain Scale 1: Numeric (0 - 10)  Pain Intensity 1: 0  Pain Location 1: Hip  Pain Orientation 1: Right  Pain Description 1: Aching  Pain Intervention(s) 1: Cold pack  Activity Tolerance:   Fair. Please refer to the flowsheet for vital signs taken during this treatment. After treatment:   ?         Patient left in no apparent distress sitting up in chair  ? Patient left in no apparent distress in bed  ? Call bell left within reach  ? Nursing notified  ? Caregiver present  ? Bed alarm activated    COMMUNICATION/EDUCATION:   The patient?s plan of care was discussed with: Occupational Therapist, Registered Nurse, Rehabilitation Attendant and NP .  ? Fall prevention education was provided and the patient/caregiver indicated understanding. ? Patient/family have participated as able in goal setting and plan of care. ?         Patient/family agree to work toward stated goals and plan of care. ?         Patient understands intent and goals of therapy, but is neutral about his/her participation. ? Patient is unable to participate in goal setting and plan of care.     Thank you for this referral.  Danni Loza, PT   Time Calculation: 43 mins

## 2019-06-13 NOTE — PROGRESS NOTES
Reason for Admission:  Right hip OA                   RRAT Score: 8                    Plan for utilizing home health:  [facility], At [address]: on file                          Transition of Care Plan:                      Patient is a 79 y/o  male who was admitted for a planned surgery preformed by Dr. Lexus Ortega with [facility]. CM made room visit with patient who was asleep but had spouse and brother-in-law at bedside. CM completed assessment with patient's wife who confirmed demographics, insurance, and PCP (last seen 2 weeks ago). Patient uses [facility] on [address]. Patient and spouse live in single level home with no entry steps. Patient's spouse confirmed that family live nearby for additional support. Patient has DME including raised toilet seat, cane, and RW. Prior to admission patient was independent with ADLs (painful), IADLs (painful, used cane), and driving. Patient has used [place] in the past when he and spouse lived outside of [facility]. Patient has no history of SNF or IPR. Pt's spouse to transport patient home and to doctors appointments post-op. Patient's wife stated that patient's plan is to return home with home health as he did for his last surgery a couple years ago. Pt's wife chose [facility] first choice and At [facility] second choice. FOC signed and on bedside chart. CM has sent referral to [facility] and waiting for response. Care Management Interventions  PCP Verified by CM: Yes(last seen 2 wk ago)  Mode of Transport at Discharge:  Other (see comment)(spouse)  Transition of Care Consult (CM Consult): Discharge Planning  Discharge Durable Medical Equipment: No  Physical Therapy Consult: Yes  Occupational Therapy Consult: Yes  Speech Therapy Consult: No  Current Support Network: Lives with Spouse, Own Home, Family Lives Nearby(patient and spouse live in single level home with no entry steps)  Confirm Follow Up Transport: Family  Plan discussed with Pt/Family/Caregiver: Yes  Freedom of Choice Offered: Yes  Discharge Location  Discharge Placement: Home with home health    Waldo Gonzalez, [address] [facility]

## 2019-06-13 NOTE — ANESTHESIA POSTPROCEDURE EVALUATION
Procedure(s):  RIGHT TOTAL HIP ARTHROPLASTY - ANTERIOR APPROACH WITH NAVIGATION. general    Anesthesia Post Evaluation        Patient location during evaluation: PACU  Note status: Adequate. Level of consciousness: responsive to verbal stimuli and sleepy but conscious  Pain management: satisfactory to patient  Airway patency: patent  Anesthetic complications: no  Cardiovascular status: acceptable  Respiratory status: acceptable  Hydration status: acceptable  Comments: +Post-Anesthesia Evaluation and Assessment    Patient: Osorio Shay MRN: [id number]  SSN: [id number]   YOB: 1940  Age: 78 y.o. Sex: male          Cardiovascular Function/Vital Signs    /63   Pulse (!) 58   Temp 36.6 °C (97.9 °F)   Resp 10   Wt 88.8 kg (195 lb 12.3 oz)   SpO2 96%   BMI 28.09 kg/m²     Patient is status post Procedure(s):  RIGHT TOTAL HIP ARTHROPLASTY - ANTERIOR APPROACH WITH NAVIGATION. Nausea/Vomiting: Controlled. Postoperative hydration reviewed and adequate. Pain:  Pain Scale 1: FLACC (06/13/19 1030)  Pain Intensity 1: 0 (06/13/19 1030)   Managed. Neurological Status:   Neuro (WDL): Exceptions to WDL (06/13/19 1030)   At baseline. Mental Status and Level of Consciousness: Arousable. Pulmonary Status:   O2 Device: Nasal cannula (06/13/19 1115)   Adequate oxygenation and airway patent. Complications related to anesthesia: None    Post-anesthesia assessment completed. No concerns. I have evaluated the patient and the patient is stable and ready to be discharged from PACU . Signed By: Justen Miller MD    6/13/2019        Vitals Value Taken Time   /51 6/13/2019 11:45 AM   Temp 36.6 °C (97.9 °F) 6/13/2019 10:34 AM   Pulse 52 6/13/2019 11:46 AM   Resp 12 6/13/2019 11:46 AM   SpO2 99 % 6/13/2019 11:46 AM   Vitals shown include unvalidated device data.

## 2019-06-13 NOTE — PROGRESS NOTES
Ortho/ NeuroSurgery NP Note    POD# 0  s/p RIGHT TOTAL HIP ARTHROPLASTY - ANTERIOR APPROACH WITH NAVIGATION     Pt resting in bed, with family at bedside. No complaints at this time   VSS Afebrile. Patient has had something to eat/drink. No nausea. Most Recent Labs:   Lab Results   Component Value Date/Time    HGB 14.3 06/04/2019 08:26 AM    Hemoglobin A1c 6.5 (H) 05/28/2019 11:44 AM       Body mass index is 28.09 kg/m². Reference: BMI greater than 30 is classified as obesity and greater than 40 is classified as morbid obesity. STOP BANG Score: 4    Voiding status: needs to void   Dressing c.d.i, with cryotherapy in place    Calves soft and supple; No pain with passive stretch  Sensation and motor intact  SCDs for mechanical DVT proph    Plan:  1) PT BID starting today  2) Gladys-op Antibiotics Ancef  3) Aspirin 81 mg PO BID for DVT Prophylaxis  4) Pepcid for GI Prophylaxis    5) Discharge plans to home with wife pending readiness.      Readiness for discharge:     [x] Vital Signs stable    [x] Hgb stable- no signs or symptoms of hemodynamic instability    [] + Voiding    [x] Incision intact, drainage minimal    [x] Tolerating PO intake- scopolamine patch behind right ear   [] Cleared by PT (OT if applicable)     [] Stair training completed (if applicable)    [] Independent/Contact Guard ambulation with assistive device (household distance)     [] Bed mobility     [] Car transfers     [] ADLs    [x] Adequate pain control on oral medication alone      Krupa Awan, NP

## 2019-06-13 NOTE — H&P
Date of Surgery Update:  Dorthula Lundborg was seen and examined on the day of surgery prior to the procedure. There were no significant clinical changes since the completion of the History and Physical.    Exam today prior to surgery showed no acute cardiac findings, no respiratory difficulty, and no abdominal complaints or pain. This patient is not a candidate for TXA. Documentation of Medical Necessity:    Symptoms: pain with activity and at rest, antalgia, interferes with ADLs    Conservative Treatment: activity modification, multiple medications, injection    Physical Findings: painful AROM/PROM, antalgia on ambulation, no trochanteric pain    Imaging: significant OA, sclerosis and osteophytes    Indications:   Failure of conservative treatments with daily pain and functional limitations. Appropriate imaging demonstrating significant disease. Appropriate physical findings consistent with significant degenerative joint disease. All pertinent risks, benefits, and alternatives to operative management including continued conservative care were explained at length. The patient has elected to proceed with appropriately indicated and medically necessary total joint arthroplasty. They understand no guarantees can be given about the outcome.     Signed By: MICHELLE Baker     June 13, 2019 9:24 AM

## 2019-06-13 NOTE — PERIOP NOTES
TRANSFER - OUT REPORT:    Verbal report given on Priyank Orourke  being transferred to [phone] [facility]unit) for routine post - op       Report consisted of patients Situation, Background, Assessment and   Recommendations(SBAR). Information from the following report(s) SBAR, Kardex, OR Summary, Intake/Output, MAR and Recent Results was reviewed with the receiving nurse. Opportunity for questions and clarification was provided.       Patient transported with:   O2 @ 4 liters  Tech

## 2019-06-14 ENCOUNTER — HOME HEALTH ADMISSION (OUTPATIENT)
Dept: HOME HEALTH SERVICES | Facility: HOME HEALTH | Age: 79
End: 2019-06-14
Payer: MEDICARE

## 2019-06-14 VITALS
OXYGEN SATURATION: 94 % | WEIGHT: 195.77 LBS | DIASTOLIC BLOOD PRESSURE: 79 MMHG | RESPIRATION RATE: 18 BRPM | BODY MASS INDEX: 28.09 KG/M2 | HEART RATE: 73 BPM | SYSTOLIC BLOOD PRESSURE: 129 MMHG | TEMPERATURE: 97.7 F

## 2019-06-14 LAB
ANION GAP SERPL CALC-SCNC: 6 MMOL/L (ref 5–15)
BUN SERPL-MCNC: 13 MG/DL (ref 6–20)
BUN/CREAT SERPL: 12 (ref 12–20)
CALCIUM SERPL-MCNC: 8.6 MG/DL (ref 8.5–10.1)
CHLORIDE SERPL-SCNC: 104 MMOL/L (ref 97–108)
CO2 SERPL-SCNC: 28 MMOL/L (ref 21–32)
CREAT SERPL-MCNC: 1.09 MG/DL (ref 0.7–1.3)
GLUCOSE SERPL-MCNC: 126 MG/DL (ref 65–100)
HGB BLD-MCNC: 12.4 G/DL (ref 12.1–17)
POTASSIUM SERPL-SCNC: 4 MMOL/L (ref 3.5–5.1)
SODIUM SERPL-SCNC: 138 MMOL/L (ref 136–145)

## 2019-06-14 PROCEDURE — 36415 COLL VENOUS BLD VENIPUNCTURE: CPT

## 2019-06-14 PROCEDURE — 85018 HEMOGLOBIN: CPT

## 2019-06-14 PROCEDURE — 97110 THERAPEUTIC EXERCISES: CPT

## 2019-06-14 PROCEDURE — 74011250637 HC RX REV CODE- 250/637: Performed by: PHYSICIAN ASSISTANT

## 2019-06-14 PROCEDURE — 80048 BASIC METABOLIC PNL TOTAL CA: CPT

## 2019-06-14 PROCEDURE — 97535 SELF CARE MNGMENT TRAINING: CPT

## 2019-06-14 PROCEDURE — 74011250636 HC RX REV CODE- 250/636: Performed by: PHYSICIAN ASSISTANT

## 2019-06-14 PROCEDURE — 97165 OT EVAL LOW COMPLEX 30 MIN: CPT

## 2019-06-14 PROCEDURE — 97116 GAIT TRAINING THERAPY: CPT

## 2019-06-14 RX ADMIN — OXYCODONE HYDROCHLORIDE 5 MG: 5 TABLET ORAL at 12:03

## 2019-06-14 RX ADMIN — OXYCODONE HYDROCHLORIDE 10 MG: 5 TABLET ORAL at 05:42

## 2019-06-14 RX ADMIN — DEXAMETHASONE SODIUM PHOSPHATE 10 MG: 4 INJECTION, SOLUTION INTRAMUSCULAR; INTRAVENOUS at 08:37

## 2019-06-14 RX ADMIN — OXYCODONE HYDROCHLORIDE 5 MG: 5 TABLET ORAL at 08:38

## 2019-06-14 RX ADMIN — Medication 10 ML: at 05:42

## 2019-06-14 RX ADMIN — SODIUM CHLORIDE 125 ML/HR: 900 INJECTION, SOLUTION INTRAVENOUS at 03:09

## 2019-06-14 RX ADMIN — ACETAMINOPHEN 650 MG: 325 TABLET ORAL at 00:30

## 2019-06-14 RX ADMIN — ALLOPURINOL 100 MG: 100 TABLET ORAL at 08:38

## 2019-06-14 RX ADMIN — ACETAMINOPHEN 650 MG: 325 TABLET ORAL at 12:03

## 2019-06-14 RX ADMIN — POLYETHYLENE GLYCOL 3350 17 G: 17 POWDER, FOR SOLUTION ORAL at 08:37

## 2019-06-14 RX ADMIN — ACETAMINOPHEN 650 MG: 325 TABLET ORAL at 05:42

## 2019-06-14 RX ADMIN — Medication 2 G: at 00:30

## 2019-06-14 RX ADMIN — SENNOSIDES,DOCUSATE SODIUM 1 TABLET: 8.6; 5 TABLET, FILM COATED ORAL at 08:37

## 2019-06-14 RX ADMIN — FAMOTIDINE 20 MG: 20 TABLET ORAL at 08:37

## 2019-06-14 RX ADMIN — GABAPENTIN 600 MG: 300 CAPSULE ORAL at 08:37

## 2019-06-14 RX ADMIN — CELECOXIB 200 MG: 200 CAPSULE ORAL at 08:38

## 2019-06-14 RX ADMIN — ATENOLOL 25 MG: 25 TABLET ORAL at 08:37

## 2019-06-14 RX ADMIN — OXYCODONE HYDROCHLORIDE 10 MG: 5 TABLET ORAL at 01:18

## 2019-06-14 RX ADMIN — ASPIRIN 81 MG 81 MG: 81 TABLET ORAL at 08:38

## 2019-06-14 NOTE — DISCHARGE INSTRUCTIONS
Bryce Rivera  Surgery: Total Hip Replacement  Surgeon:   Erwin Zambrano MD  Surgery Date:  6/13/2019     To relieve pain:   Use ice/gel packs.  Put the ice pack directly over the wound, or anywhere you are hurting or swollen.  To control pain and swelling, keep ice on regularly, especially after physical activity.  The packs should stay cold for 3-4 hours. When it is not cold anymore, rotate with the packs in the freezer.  Elevate your leg. This will also keep swelling down.  Rest for at least 20 minutes between activity or exercises.  To keep track of your pain medications, write down what you take and when you take it.  The last dose of pain medication you got in the hospital was:       Medication    Dose    Date & Time           Choose your medications based on the pain scale below:     To keep your pain under control, take Tylenol every 6 hours for 14 days - even if you feel like you dont need it.  For mild to moderate pain (4-6 on pain scale), take one pain pill every 3-4 hours as needed.  For severe pain (7-10 on pain scale), take two pain pills every 3-4 hours as needed.  To prevent nausea, take your pain medications with food. Pain Scale              As your pain lessens:     Slowly start taking less pain medication. You may do this by waiting longer between doses or by taking smaller doses.  Stop using the pain medications as soon as you no longer need it, usually in 2-3 weeks.  Aspirin   To prevent blood clots, you will need to take Aspirin 81 mg twice a day for 30 days.  To prevent stomach upset or bleeding:   Do not take non-steroidal anti-inflammatory medications (Ibuprofen, Advil, Motrin, Naproxen, etc.)    Take Pepcid 20 mg twice a day, or a similar home medication, while you are taking a blood thinner.              OPSITE (Honeycomb dressing)     Keep your clear, waterproof dressing in place for 5-7 days after your leave the hospital.     If you are still having drainage, you will need to change your dressing in 5-7 days. You will be given one extra dressing to use at home.  If there is no more drainage from the wound, you may leave it open to the air. OPSITE DRESSING INSTRUCTIONS                  PRINEO DRESSING INSTRUCTIONS       Prineo is a type of skin glue and mesh that is keeping your wound together.  Leave this covering alone. Do not peel it off.  Do not apply oils or lotions over it.  You may shower with this dressing but do not soak it. Gently pat your wound dry when you get out of the shower.  DO NOTs:   Do not rub your surgical wound   Do not put lotion or oils on your wound.  Do not take a tub bath or go swimming until your doctor says it is ok.  You may shower with this dressing over your wound.  After showering pat the dressing dry.  If you have staples a home health nurse will remove them in about 10 days. Stop scopolamine patch/ remove it no later than [place] morning 6/16       To increase and promote healing:     Stop Smoking (or at least cut back on       Smoking).  Eat a well-balanced diet (high in protein       and vitamin C).  If you have a poor appetite, drink Ensure, Glucerna, or [place] Instant Breakfast for the next 30 days.  If you are diabetic, you should control your blood         sugars to prevent infection and help your wound         to heal.                         To prevent constipation, stay active and drink plenty of fluid.  While using pain medications, you should also take stool softeners and laxatives, such as Pericolace       and Miralax.  If you are having too many bowel       Movements, then you may need       to stop taking the laxatives.      You should have a bowel movement 3-4 days after surgery and then at least every other day while       on pain medication.  To improve your recovery, you must be active!  Use your walker and take short walks         (in your home). about every 2 hours during the day.  Try to increase how far you walk each day.  You can put as much weight on your leg as you can tolerate while walking.  Home health physical therapy will come to your       home the day after you leave the hospital.  The       therapist will visit about 4 times over the next couple of       weeks to teach you how to get out of bed, to safely walk       in your home, and to do your exercises.  NO DRIVING until your surgeon tells you it is ok.  You can return to work when cleared by a physician.  Please call your physician immediately if you have:     Constant bleeding from your wound.  Increasing redness or swelling around your wound (Some warmth, bruising and swelling is normal).  Change in wound drainage (increase in amount, color, or bad smell).  Change in mental status (unusual behavior   Temperature over 101.5 degrees Fahrenheit      Pain or redness in the calf (back of your lower leg - see picture)     Increased swelling of the thigh, ankle, calf, or foot.  Emergency: CALL 911 if you have:     Shortness of breath     Chest pain when you cough or taking a deep breath     Please call your surgeons office at [phone]  for a follow up appointment. _   You should call as soon as you get home or the next day after discharge. Ask to make an appointment in 2 weeks.  If you have questions or concerns during normal business hours, you may reach Dr. Manisha Beyer' Team at [phone].

## 2019-06-14 NOTE — PROGRESS NOTES
Patient's on room air. 1 person assist with walker to bathroom. C/o pain to right leg. Pain medication given throughout shift. Dressing on right leg is CDI.  Voids using the urinal.

## 2019-06-14 NOTE — PROGRESS NOTES
Problem: Mobility Impaired (Adult and Pediatric)  Goal: *Acute Goals and Plan of Care (Insert Text)  Description  Physical Therapy Goals  Initiated 6/13/2019    1. Patient will move from supine to sit and sit to supine , scoot up and down and roll side to side in bed with modified independence within 4 days. Mod I  2. Patient will perform sit to stand with modified independence within 4 days. MET  3. Patient will ambulate with modified independence for 450 feet with the least restrictive device within 4 days. 4. Patient will ascend/descend 4 stairs with 1 handrail(s) with supervision/set-up within 4 days. 5. Patient will verbalize and demonstrate understanding of Anterior THR precautions per protocol within 4 days. MET  6. Patient will perform THR home exercise program per protocol with modified independence within 4 days. MET   Outcome: Progressing Towards Goal  PHYSICAL THERAPY TREATMENT/DISCHARGE  Patient: Ronold Buerger (80 y.o. male)  Date: 6/14/2019  Diagnosis: S/P hip replacement [Z96.649] Status post right hip replacement  Procedure(s) (LRB):  RIGHT TOTAL HIP ARTHROPLASTY - ANTERIOR APPROACH WITH NAVIGATION (Right) 1 Day Post-Op  Precautions: Total hip(R anterior THR)  Chart, physical therapy assessment, plan of care and goals were reviewed. ASSESSMENT:  Pt cleared by RN for PT am session. Pt received sitting in bedside chair, wife present, agreeable to tx. Pt reporting 4-5/10 pain with mobility and demonstrating all mobility this visit with mod I to S. Pt able to ambulate 200ft with RW and demonstrate car transfer with SBA. Stair navigation not assessed, however pt able verbalize the correct step to pattern. Pt doesn't have stairs to navigate at home. No further skilled PT needs in the acute setting. Recommend HHPT at discharge. PLAN:  Patient will be discharged from acute skilled physical therapy at this time. Rationale for discharge:  ?    Goals Achieved  ? Plateau Reached  ? Patient not participating in therapy  ? Other:  Discharge Recommendations:  Home Health  Further Equipment Recommendations for Discharge:  none     SUBJECTIVE:   Patient stated ? I am ready to go home? OBJECTIVE DATA SUMMARY:   Critical Behavior:  Neurologic State: Alert  Orientation Level: Oriented X4  Cognition: Appropriate decision making, Appropriate for age attention/concentration, Appropriate safety awareness, Follows commands  Safety/Judgement: Awareness of environment, Good awareness of safety precautions, Fall prevention  Functional Mobility Training:  Bed Mobility:  Rolling: Modified independent  Supine to Sit: Modified independent     Scooting: Modified independent        Transfers:  Sit to Stand: Contact guard assistance  Stand to Sit: Stand-by assistance           Balance:  Sitting: Intact  Standing: Impaired; Without support  Standing - Static: Good;Constant support  Standing - Dynamic : Good;Constant support  Ambulation/Gait Training:  Distance (ft): 200 Feet (ft)  Assistive Device: Walker, rolling;Gait belt  Ambulation - Level of Assistance: Stand-by assistance        Gait Abnormalities: Antalgic  Right Side Weight Bearing: As tolerated  Left Side Weight Bearing: Full  Base of Support: Widened  Stance: Right decreased  Speed/Wendy: Pace decreased (<100 feet/min)  Step Length: Right shortened;Left shortened           Therapeutic Exercises:     STANDING  EXERCISES   Sets   Reps   Active Active Assist   Passive Self ROM   Comments   Heel Raises 1 10 ?                                          ?                                          ?                                          ?                                             Hip Abduction 1 10 ?                                          ?                                          ?                                          ?                                             marches 1 10 ?                                          ? ?                                          ?                                             IR 1 10 ?                                          ?                                          ?                                          ?                                                 Pain:  Pain Scale 1: Numeric (0 - 10)  Pain Intensity 1: 4  Pain Location 1: Hip  Pain Orientation 1: Right  Pain Description 1: Aching  Pain Intervention(s) 1: Declines  Activity Tolerance:   Good  Please refer to the flowsheet for vital signs taken during this treatment. After treatment:   ?  Patient left in no apparent distress sitting up in chair  ? Patient left in no apparent distress in bed  ? Call bell left within reach  ? Nursing notified  ? Caregiver present  ?   Bed alarm activated    COMMUNICATION/COLLABORATION:   The patient?s plan of care was discussed with: Registered Nurse    Lilly Randolph, PT   Time Calculation: 24 mins

## 2019-06-14 NOTE — PROGRESS NOTES
Ortho / Neurosurgery NP Note    POD# 1  s/p RIGHT TOTAL HIP ARTHROPLASTY - ANTERIOR APPROACH WITH NAVIGATION   Pt seen with PT and walking halls independently. Pt resting in bed. No complaints. VSS Afebrile. Voiding status: + void    Labs  Lab Results   Component Value Date/Time    HGB 12.4 06/14/2019 03:42 AM      Lab Results   Component Value Date/Time    INR 1.1 06/04/2019 08:26 AM        Body mass index is 28.09 kg/m². : A BMI > 30 is classified as obesity and > 40 is classified as morbid obesity. Dressing c.d.i  Cryotherapy in place over incision  Calves soft and supple;  No pain with passive stretch  Sensation and motor intact  SCDs for mechanical DVT proph while in bed     PLAN:  1) PT BID  2) Aspirin 81 mg PO BID for DVT Prophylaxis   3) GI Prophylaxis - pepcid  4) Readniess for discharge:     [x] Vital Signs stable    [x] Hgb stable    [x] + Voiding    [x] Wound intact, drainage minimal    [x] Tolerating PO intake     [x] Cleared by PT (OT if applicable)     [x] Stair training completed (if applicable)    [x] Independent / Contact Guard Assist (household distance)     [x] Bed mobility     [x] Car transfers     [x] ADLs    [x] Adequate pain control on oral medication alone     Home today    Tan Tabor, NP  DNP, ACNP-BC, ONP-C

## 2019-06-14 NOTE — PROGRESS NOTES
Orthopedic End of Shift Note    Bedside and Verbal shift change report given to [address] [place] [address] [address] (oncoming nurse) by Jenaro Rodriguez RN (offgoing nurse). Report included the following information SBAR, Kardex, Intake/Output, MAR and Recent Results. POD#  1    Significant issues during shift: Patient's on room air. 1 person assist with walker to bathroom. C/o pain to right leg. Pain medication given throughout shift. Dressing on right leg is CDI. Voids using the urinal.     Issues for Physician to address: Patient's on room air. 1 person assist with walker to bathroom. C/o pain to right leg. Pain medication given throughout shift. Dressing on right leg is CDI.  Voids using the urinal.     Activity This Shift  (check all that apply) [] chair  [] dangle   [x] bathroom  [] bedside commode [] hallway  [] bedrest   Nausea/Vomiting [] yes [x] no     Voiding Status [x] void [] Arguello [] I&O Cath   Bowel Movements [] yes [x] no     Foot Pumps or SCD [x] yes [] no    Ice Pack [x] yes    [] no    Incentive Spirometer [] yes [] no Volume:   2500   Telemetry Monitoring   [] yes [x] no Rhythm:    Supplemental O2 [] yes [x] no Sat off O2:   94%

## 2019-06-14 NOTE — PROGRESS NOTES
Bedside shift change report given to Selena Garnica RN (oncoming nurse) by Chepe Valdez RN (offgoing nurse). Report included the following information SBAR, Kardex, Procedure Summary, Intake/Output, MAR, Accordion, Recent Results and Med Rec Status.

## 2019-06-14 NOTE — DISCHARGE SUMMARY
Ortho Discharge Summary    Patient ID:  Adriane Davis  [id number]  male  78 y.o.  1940    Admit date: 6/13/2019    Discharge date: 6/14/2019    Admitting Physician: Torri Sierra MD     Consulting Physician(s):   Treatment Team: Attending Provider: Rodrigo Sosa MD; Nurse Practitioner: Torri Hansen NP; Utilization Review: Hernan Dobbs RN; Care Manager: Brennan Gary    Date of Surgery:   6/13/2019     Preoperative Diagnosis:  RIGHT HIP OSTEOARTHRITIS    Postoperative Diagnosis:   RIGHT HIP OSTEOARTHRITIS    Procedure(s):     RIGHT TOTAL HIP ARTHROPLASTY - ANTERIOR APPROACH WITH NAVIGATION     Anesthesia Type:   General     Surgeon: Rodrigo Sosa MD                            HPI:  Pt is a 78 y.o. male who has a history of RIGHT HIP OSTEOARTHRITIS  with pain and limitations of activities of daily living who presents at this time for a right JONN following the failure of conservative management. PMH:   Past Medical History:   Diagnosis Date    Adverse effect of anesthesia     hallucinations/agitation after last surgery 2018 in hospital 3 days    Arthritis     knee and hip/right    CAD (coronary artery disease) 2001    Diabetes ([facility] [address]     Hgb A1C 6.1 5/2019 - No meds, dx Pre-DM being monitored by PCP    Hypercholesteremia     Hypertension     Nausea & vomiting     Sleep apnea     Borderline/ No CPAP       Body mass index is 28.09 kg/m². : A BMI > 30 is classified as obesity and > 40 is classified as morbid obesity. Medications upon admission :   Prior to Admission Medications   Prescriptions Last Dose Informant Patient Reported? Taking? HYDROcodone-acetaminophen (NORCO) 5-325 mg per tablet Not Taking at Unknown time  Yes No   Sig: Take 1 Tab by mouth nightly. allopurinol (ZYLOPRIM) 100 mg tablet 6/13/2019 at 0500  No Yes   Sig: Take 1 Tab by mouth daily. aspirin delayed-release 81 mg tablet 6/12/2019 at Unknown time  Yes Yes   Sig: Take  by mouth daily.    atenolol (TENORMIN) 25 mg tablet 6/12/2019 at 2200  No Yes   Sig: Take 1 Tab by mouth daily. Patient taking differently: Take 25 mg by mouth nightly. atorvastatin (LIPITOR) 80 mg tablet 6/12/2019 at 2200  No Yes   Sig: Take 1 Tab by mouth daily. Patient taking differently: Take 80 mg by mouth nightly. cholecalciferol (VITAMIN D3) 1,000 unit cap 6/6/2019 at Unknown time  Yes Yes   Sig: Take  by mouth daily. gabapentin (NEURONTIN) 300 mg capsule 6/13/2019 at 0500  No Yes   Sig: Take 2 Caps by mouth three (3) times daily. losartan (COZAAR) 50 mg tablet 6/13/2019 at 0500  No Yes   Sig: Take 1 Tab by mouth daily. multivitamin (ONE A DAY) tablet 6/6/2019 at Unknown time  Yes Yes   Sig: Take 1 Tab by mouth daily. omega 3-dha-epa-fish oil (FISH OIL) 100-160-1,000 mg cap 6/6/2019 at Unknown time  Yes Yes   Sig: Take  by mouth two (2) times a day. 2 tabs bid   pyridoxine, vitamin B6, (VITAMIN B-6) 100 mg tablet 6/6/2019 at Unknown time  Yes Yes   Sig: Take 100 mg by mouth daily. traMADol (ULTRAM) 50 mg tablet 6/12/2019 at 2200  Yes Yes   Sig: Take 50 mg by mouth every six (6) hours as needed for Pain.   zolpidem (AMBIEN) 10 mg tablet 6/12/2019 at 2200  Yes Yes   Sig: Take 5 mg by mouth nightly as needed for Sleep. Facility-Administered Medications: None        Allergies:  No Known Allergies     Hospital Course: The patient underwent surgery. Complications:  None; patient tolerated the procedure well. Was taken to the PACU in stable condition and then transferred to the ortho floor. Perioperative Antibiotics:  Ancef     Postoperative Pain Management:  Oxycodone      DVT Prophylaxis: Aspirin 81    Postoperative transfusions:    Number of units banked PRBCs =   none     Post Op complications: none    Hemoglobin at discharge:    Lab Results   Component Value Date/Time    HGB 12.4 06/14/2019 03:42 AM    INR 1.1 06/04/2019 08:26 AM       Dressing was changed on POD # 1. Incision - clean, dry and intact.  No significant erythema or swelling. Neurovascular exam found to be within normal limits. Wound appears to be healing without any evidence of infection. Physical Therapy started following surgery and participated in bed mobility, transfers and ambulation. Gait:  Gait  Base of Support: Widened  Speed/Wendy: Pace decreased (<100 feet/min)  Step Length: Right shortened, Left shortened  Stance: Right decreased  Gait Abnormalities: Antalgic  Ambulation - Level of Assistance: Stand-by assistance  Distance (ft): 200 Feet (ft)  Assistive Device: Walker, rolling, Gait belt                   Discharged to: Home. Condition on Discharge:   stable    Discharge instructions:  - Anticoagulate with Aspirin 81 BID  - Take pain medications as prescribed  - Resume pre hospital diet      - Discharge activity: activity as tolerated  - Ambulate with assistive device as needed. - Weight bearing status WBAT  - Wound Care Keep wound clean and dry. See discharge instruction sheet. -DISCHARGE MEDICATION LIST     Current Discharge Medication List      START taking these medications    Details   aspirin 81 mg chewable tablet Take 1 Tab by mouth two (2) times a day for 30 days. Qty: 60 Tab, Refills: 0      acetaminophen (TYLENOL) 325 mg tablet Take 2 Tabs by mouth every six (6) hours for 14 days. Qty: 112 Tab, Refills: 0      famotidine (PEPCID) 20 mg tablet Take 1 Tab by mouth two (2) times a day for 30 days. Qty: 60 Tab, Refills: 0      oxyCODONE IR (ROXICODONE) 5 mg immediate release tablet Take 1 Tab by mouth every four (4) hours as needed for Pain for up to 14 days. Max Daily Amount: 30 mg.  Qty: 80 Tab, Refills: 0    Associated Diagnoses: Status post right hip replacement      polyethylene glycol (MIRALAX) 17 gram packet Take 1 Packet by mouth daily for 15 days. Qty: 15 Packet, Refills: 0      senna-docusate (PERICOLACE) 8.6-50 mg per tablet Take 1 Tab by mouth two (2) times a day for 15 days.   Qty: 30 Tab, Refills: 0 CONTINUE these medications which have NOT CHANGED    Details   zolpidem (AMBIEN) 10 mg tablet Take 5 mg by mouth nightly as needed for Sleep.      gabapentin (NEURONTIN) 300 mg capsule Take 2 Caps by mouth three (3) times daily. Qty: 540 Cap, Refills: 3      multivitamin (ONE A DAY) tablet Take 1 Tab by mouth daily. omega 3-dha-epa-fish oil (FISH OIL) 100-160-1,000 mg cap Take  by mouth two (2) times a day. 2 tabs bid      pyridoxine, vitamin B6, (VITAMIN B-6) 100 mg tablet Take 100 mg by mouth daily. cholecalciferol (VITAMIN D3) 1,000 unit cap Take  by mouth daily. atorvastatin (LIPITOR) 80 mg tablet Take 1 Tab by mouth daily. Qty: 90 Tab, Refills: 3      losartan (COZAAR) 50 mg tablet Take 1 Tab by mouth daily. Qty: 90 Tab, Refills: 3      allopurinol (ZYLOPRIM) 100 mg tablet Take 1 Tab by mouth daily. Qty: 90 Tab, Refills: 3      atenolol (TENORMIN) 25 mg tablet Take 1 Tab by mouth daily. Qty: 90 Tab, Refills: 3         STOP taking these medications       traMADol (ULTRAM) 50 mg tablet Comments:   Reason for Stopping:         aspirin delayed-release 81 mg tablet Comments:   Reason for Stopping:         HYDROcodone-acetaminophen (NORCO) 5-325 mg per tablet Comments:   Reason for Stopping:            per medical continuation form      -Follow up in office in 2 weeks      Signed:  Akhil Barnhart.  Steph Steven, ACNP-BC, ONP-C  Orthopaedic Nurse Practitioner    6/14/2019  9:42 AM

## 2019-06-14 NOTE — PROGRESS NOTES
Problem: Self Care Deficits Care Plan (Adult)  Goal: *Acute Goals and Plan of Care (Insert Text)  Description  Occupational Therapy Goals  Initiated 6/14/2019  1. Patient will perform bathing with modified independence within 7 day(s). 2.  Patient will perform lower body dressing with modified independence within 7 day(s). 3.  Patient will perform grooming at sink with independence within 7 day(s). 4.  Patient will perform toilet transfers with modified independence within 7 day(s). 5.  Patient will perform all aspects of toileting with modified independence within 7 day(s). Outcome: Progressing Towards Goal     OCCUPATIONAL THERAPY EVALUATION  Patient: Hans Hanson (54 y.o. male)  Date: 6/14/2019  Primary Diagnosis: S/P hip replacement [Z96.649]  Procedure(s) (LRB):  RIGHT TOTAL HIP ARTHROPLASTY - ANTERIOR APPROACH WITH NAVIGATION (Right) 1 Day Post-Op   Precautions: Total hip(R anterior THR)    ASSESSMENT :  Based on the objective data described below, the patient presents with decreased ADL and mobility s/p elective Anterior R JONN. Trained with adaptive equipment, issued hip kit, and reviewed safety at home. Wife also present. All questions answered. Left with PT in room who arrived for morning session. [facility] Patient will benefit from skilled intervention to address the above impairments. Patient?s rehabilitation potential is considered to be Good  Factors which may influence rehabilitation potential include:   ? None noted  ? Mental ability/status  ? Medical condition  ? Home/family situation and support systems  ? Safety awareness  ? Pain tolerance/management  ? Other:      PLAN :  Recommendations and Planned Interventions:  ?                  Self Care Training                  ? Therapeutic Activities  ? Functional Mobility Training    ? Cognitive Retraining  ? Therapeutic Exercises           ? Endurance Activities  ? Balance Training                   ? Neuromuscular Re-Education  ? Visual/Perceptual Training     ? Home Safety Training  ? Patient Education                 ? Family Training/Education  ? Other (comment):    Frequency/Duration: Patient will be followed by occupational therapy 5 times a week to address goals. Discharge Recommendations: Home Health  Further Equipment Recommendations for Discharge: none      SUBJECTIVE:   Patient stated ?it was hard to get dressed before the surgery. ?    The patient stated 0 /3 anterior hip precautions. Reviewed all 3 with patient.     OBJECTIVE DATA SUMMARY:   HISTORY:   Past Medical History:   Diagnosis Date    Adverse effect of anesthesia     hallucinations/agitation after last surgery 2018 in hospital 3 days    Arthritis     knee and hip/right    CAD (coronary artery disease) 2001    Diabetes ([facility] [address]     Hgb A1C 6.1 5/2019 - No meds, dx Pre-DM being monitored by PCP    Hypercholesteremia     Hypertension     Nausea & vomiting     Sleep apnea     Borderline/ No CPAP     Past Surgical History:   Procedure Laterality Date    CARDIAC SURG PROCEDURE UNLIST  03/2001    Bypass    CARDIAC SURG PROCEDURE UNLIST  2008    stents    HX APPENDECTOMY      HX CORONARY ARTERY BYPASS GRAFT  2001    5v    HX GI      umbilical hernia x3    HX HIP REPLACEMENT Left 2017    HX KNEE ARTHROSCOPY Right 2007    right knee    TOTAL KNEE ARTHROPLASTY Left 2017    left hip replacement       Prior Level of Function/Environment/Context: independent baseline  Expanded or extensive additional review of patient history:     Home Situation  Home Environment: Private residence  # Steps to Enter: 0  Wheelchair Ramp: No  One/Two Story Residence: One story  Living Alone: No  Support Systems: Spouse/Significant Other/Partner, Family member(s)  Patient Expects to be Discharged to[de-identified] Private residence  Current DME Used/Available at Home: Eveleen Frames, straight, Walker, rolling  Hand dominance: Right    EXAMINATION OF PERFORMANCE DEFICITS:  Cognitive/Behavioral Status:  Neurologic State: Alert  Orientation Level: Oriented X4  Cognition: Appropriate decision making; Appropriate for age attention/concentration; Appropriate safety awareness; Follows commands             Skin: R hip incision    Edema: none    Hearing: Auditory  Auditory Impairment: Hard of hearing, bilateral  Hearing Aids/Status: Bilateral    Vision/Perceptual:                                     Range of Motion:  WFL                            Strength:  WFL                   Coordination:           intact    Tone & Sensation:  Normal and intact                            Balance:  Sitting: Intact  Standing: Impaired; Without support  Standing - Static: Good;Constant support  Standing - Dynamic : Good;Constant support    Functional Mobility and Transfers for ADLs:  Bed Mobility:  Rolling: Modified independent  Supine to Sit: Modified independent  Scooting: Modified independent    Transfers:  Sit to Stand: Contact guard assistance  Stand to Sit: Stand-by assistance  Bathroom Mobility: Contact guard assistance  Toilet Transfer : Contact guard assistance    ADL Assessment:  Feeding: Independent    Oral Facial Hygiene/Grooming: Independent    Bathing: Minimum assistance    Upper Body Dressing: Independent    Lower Body Dressing: Minimum assistance    Toileting: Stand by assistance                ADL Intervention and task modifications:                           Lower Body Dressing Assistance  Pants With Elastic Waist: Stand-by assistance; Compensatory technique training  Socks: Stand-by assistance; Total assistance (dependent)  Position Performed: Seated in chair  Adaptive Equipment Used: Sock aid;Reacher;Dressing stick    Toileting  Toileting Assistance: Stand-by assistance Bathing: Patient instructed when bathing to not submerge wound in water, stand to shower or sponge bathe, cover wound with plastic and tape to ensure no water reaches bandage/wound without cues. Patient indicated understanding. Dressing joint: Patient instructed and demonstrated to don/doff Right LE first/last verbal cues. Patient instructed and demonstrated to don all clothing while sitting prior to standing, doff all clothing to knees while standing, then sit to doff clothing off from knees to feet in order to facilitate fall prevention, pain management, and energy conservation with   AE and Stand-by assistance. Home safety: Patient instructed on home modifications and safety (raise height of ADL objects, appropriate height of chair surfaces, recliner safety, change of floor surfaces, clear pathways) to increase independence and fall prevention. Patient indicated understanding. Standing: Patient instructed and demonstrated to walk up to sink/counter top/surfaces, step into walker to increase safety of joint and fall prevention with Contact guard assistance. Patient educated about hip anatomy verbally and with pictures and educated to avoid internal rotation of Right LE. Instructed to apply concept to ADLs within the home (no reaching across body to right side, square off while using objects, slide objects along surfaces). Patient instructed to increase amount of time standing, observe standing position during ADLs in order to increase even weight bearing through bilateral LEs in order to increase independence with ADLs. Goal to be reached 30 days post - op, per orthopedic surgeon or per PT. Patient indicated understanding.       Functional Measure:  Barthel Index:    Bathin  Bladder: 10  Bowels: 10  Groomin  Dressin  Feeding: 10  Mobility: 10  Stairs: 5  Toilet Use: 5  Transfer (Bed to Chair and Back): 10  Total: 70/100        Percentage of impairment   0%   1-19%   20-39%   40-59% 60-79%   80-99%   100%   Barthel Score 0-100 100 99-80 79-60 59-40 20-39 1-19   0     The Barthel ADL Index: Guidelines  1. The index should be used as a record of what a patient does, not as a record of what a patient could do. 2. The main aim is to establish degree of independence from any help, physical or verbal, however minor and for whatever reason. 3. The need for supervision renders the patient not independent. 4. A patient's performance should be established using the best available evidence. Asking the patient, friends/relatives and nurses are the usual sources, but direct observation and common sense are also important. However direct testing is not needed. 5. Usually the patient's performance over the preceding 24-48 hours is important, but occasionally longer periods will be relevant. 6. Middle categories imply that the patient supplies over 50 per cent of the effort. 7. Use of aids to be independent is allowed. Karolyn Dorado., Barthel, DLucilaW. ([id number]). Functional evaluation: the Barthel Index. [address] [facility] (14)2. Kat Cox stoney LESVIA [place], Gianfranco Andrews., Naheed Mason., [place], [facility] (1999). Measuring the change indisability after inpatient rehabilitation; comparison of the responsiveness of the Barthel Index and Functional [place] Measure. Journal of Neurology, Neurosurgery, and Psychiatry, 66(4), 444-506. Papo Espinoza, N.J.A, LINDSEY Plummer, & Theron Mancini, M.A. (2004.) Assessment of post-stroke quality of life in cost-effectiveness studies: The usefulness of the Barthel Index and the EuroQoL-5D.  Quality of Life Research, 15, 195-19        Occupational Therapy Evaluation Charge Determination   History Examination Decision-Making   LOW Complexity : Brief history review  LOW Complexity : 1-3 performance deficits relating to physical, cognitive , or psychosocial skils that result in activity limitations and / or participation restrictions  LOW Complexity : No comorbidities that affect functional and no verbal or physical assistance needed to complete eval tasks       Based on the above components, the patient evaluation is determined to be of the following complexity level: LOW     Pain:  Pain Scale 1: Numeric (0 - 10)  Pain Intensity 1: 6  Pain Location 1: Hip  Pain Orientation 1: Right  Pain Description 1: Aching  Pain Intervention(s) 1: Medication (see MAR)  Activity Tolerance:   good  Please refer to the flowsheet for vital signs taken during this treatment. After treatment:   ? Patient left in no apparent distress sitting up in chair  ? Patient left in no apparent distress in bed  ? Call bell left within reach  ? Nursing notified  ? Caregiver present  ? Bed alarm activated    COMMUNICATION/EDUCATION:   The patient?s plan of care was discussed with: Physical Therapist.  ? Home safety education was provided and the patient/caregiver indicated understanding. ? Patient/family have participated as able in goal setting and plan of care. ?    Patient/family agree to work toward stated goals and plan of care. ?    Patient understands intent and goals of therapy, but is neutral about his/her participation. ? Patient is unable to participate in goal setting and plan of care. This patient?s plan of care is appropriate for delegation to [facility].     Thank you for this referral.  Maximo Tavares  Time Calculation: 40 mins

## 2019-06-15 ENCOUNTER — HOME CARE VISIT (OUTPATIENT)
Dept: SCHEDULING | Facility: HOME HEALTH | Age: 79
End: 2019-06-15
Payer: MEDICARE

## 2019-06-15 VITALS
HEART RATE: 72 BPM | DIASTOLIC BLOOD PRESSURE: 62 MMHG | HEIGHT: 70 IN | BODY MASS INDEX: 28.35 KG/M2 | WEIGHT: 198 LBS | SYSTOLIC BLOOD PRESSURE: 112 MMHG | OXYGEN SATURATION: 97 % | TEMPERATURE: 98.4 F

## 2019-06-15 PROCEDURE — G0151 HHCP-SERV OF PT,EA 15 MIN: HCPCS

## 2019-06-15 PROCEDURE — 3331090002 HH PPS REVENUE DEBIT

## 2019-06-15 PROCEDURE — 400013 HH SOC

## 2019-06-15 PROCEDURE — 3331090001 HH PPS REVENUE CREDIT

## 2019-06-16 PROCEDURE — 3331090001 HH PPS REVENUE CREDIT

## 2019-06-16 PROCEDURE — 3331090002 HH PPS REVENUE DEBIT

## 2019-06-17 ENCOUNTER — HOME CARE VISIT (OUTPATIENT)
Dept: SCHEDULING | Facility: HOME HEALTH | Age: 79
End: 2019-06-17
Payer: MEDICARE

## 2019-06-17 ENCOUNTER — PATIENT OUTREACH (OUTPATIENT)
Dept: INTERNAL MEDICINE CLINIC | Age: 79
End: 2019-06-17

## 2019-06-17 VITALS
OXYGEN SATURATION: 98 % | HEART RATE: 68 BPM | SYSTOLIC BLOOD PRESSURE: 116 MMHG | DIASTOLIC BLOOD PRESSURE: 64 MMHG | RESPIRATION RATE: 16 BRPM | TEMPERATURE: 97.8 F

## 2019-06-17 PROCEDURE — 3331090002 HH PPS REVENUE DEBIT

## 2019-06-17 PROCEDURE — G0151 HHCP-SERV OF PT,EA 15 MIN: HCPCS

## 2019-06-17 PROCEDURE — 3331090001 HH PPS REVENUE CREDIT

## 2019-06-17 NOTE — PROGRESS NOTES
Hospital Discharge Follow-Up      Date/Time:  2019 10:50 AM    Patient was admitted to [facility] on 19 and discharged on 19 for Right THR. The physician discharge summary was available at the time of outreach. Patient was contacted within 1 business days of discharge. Top Challenges reviewed with the provider   S/p Right THR on   No complications   Using Walker at home - [facility] PT  F/u with Dr. Teresa Spence on          Method of communication with provider :chart routing    Inpatient RRAT score: 8  Was this a readmission? no   Patient stated reason for the readmission: n/a    Nurse Navigator (NN) contacted the family by telephone to perform post hospital discharge assessment. Verified name and  with family as identifiers. Provided introduction to self, and explanation of the Nurse Navigator role. Reviewed discharge instructions and red flags with family who verbalized understanding. Family given an opportunity to ask questions and does not have any further questions or concerns at this time. The family agrees to contact the PCP office for questions related to their healthcare. NN provided contact information for future reference. Disease Specific:   N/A    Summary of patient's top problems:  1. Ortho: RIGHT HIP OSTEOARTHRITIS presented for Right Total Hip Replacement after failed outpatient conservative measures. Patient had previous L JONN in 2017 after a fall. Hgb 14.3 pre surgery, 12.4 post surgery. No post surgery complications. Using [facility]. Home with Wife and [facility] PT.        Home Health orders at discharge: [facility]  Date of initial visit: 6/15/19    Durable Medical Equipment ordered/company: none  Durable Medical Equipment received: n/a    Barriers to care? lack of knowledge about disease    Advance Care Planning:   Does patient have an Advance Directive:  reviewed and current     Medication(s):   New Medications at Discharge: aspirin 81 mg chewable tablet Take 1 Tab by mouth two (2) times a day for 30 days. Qty: 60 Tab, Refills: 0       acetaminophen (TYLENOL) 325 mg tablet Take 2 Tabs by mouth every six (6) hours for 14 days. Qty: 112 Tab, Refills: 0       famotidine (PEPCID) 20 mg tablet Take 1 Tab by mouth two (2) times a day for 30 days. Qty: 60 Tab, Refills: 0       oxyCODONE IR (ROXICODONE) 5 mg immediate release tablet Take 1 Tab by mouth every four (4) hours as needed for Pain for up to 14 days. Max Daily Amount: 30 mg.  Qty: 80 Tab, Refills: 0     Associated Diagnoses: Status post right hip replacement       polyethylene glycol (MIRALAX) 17 gram packet Take 1 Packet by mouth daily for 15 days. Qty: 15 Packet, Refills: 0       senna-docusate (PERICOLACE) 8.6-50 mg per tablet Take 1 Tab by mouth two (2) times a day for 15 days. Qty: 30 Tab, Refills: 0         Changed Medications at Discharge: n/a  Discontinued Medications at Discharge:        traMADol (ULTRAM) 50 mg tablet Comments:   Reason for Stopping:            aspirin delayed-release 81 mg tablet Comments:   Reason for Stopping:            HYDROcodone-acetaminophen (NORCO) 5-325 mg per tablet Comments:   Reason for Stopping:        Medication reconciliation was performed with family, who verbalizes understanding of administration of home medications. There were no barriers to obtaining medications identified at this time. Referral to Pharm D needed: no     Current Outpatient Medications   Medication Sig    aspirin 81 mg chewable tablet Take 1 Tab by mouth two (2) times a day for 30 days.  acetaminophen (TYLENOL) 325 mg tablet Take 2 Tabs by mouth every six (6) hours for 14 days.  famotidine (PEPCID) 20 mg tablet Take 1 Tab by mouth two (2) times a day for 30 days.  oxyCODONE IR (ROXICODONE) 5 mg immediate release tablet Take 1 Tab by mouth every four (4) hours as needed for Pain for up to 14 days. Max Daily Amount: 30 mg.     polyethylene glycol (MIRALAX) 17 gram packet Take 1 Packet by mouth daily for 15 days.  senna-docusate (PERICOLACE) 8.6-50 mg per tablet Take 1 Tab by mouth two (2) times a day for 15 days.  zolpidem (AMBIEN) 10 mg tablet Take 5 mg by mouth nightly as needed for Sleep.  gabapentin (NEURONTIN) 300 mg capsule Take 2 Caps by mouth three (3) times daily.  multivitamin (ONE A DAY) tablet Take 1 Tab by mouth daily.  omega 3-dha-epa-fish oil (FISH OIL) 100-160-1,000 mg cap Take  by mouth two (2) times a day. 2 tabs bid    pyridoxine, vitamin B6, (VITAMIN B-6) 100 mg tablet Take 100 mg by mouth daily.  cholecalciferol (VITAMIN D3) 1,000 unit cap Take  by mouth daily.  atorvastatin (LIPITOR) 80 mg tablet Take 1 Tab by mouth daily. (Patient taking differently: Take 80 mg by mouth nightly.)    losartan (COZAAR) 50 mg tablet Take 1 Tab by mouth daily.  allopurinol (ZYLOPRIM) 100 mg tablet Take 1 Tab by mouth daily.  atenolol (TENORMIN) 25 mg tablet Take 1 Tab by mouth daily. (Patient taking differently: Take 25 mg by mouth nightly.)     No current facility-administered medications for this visit. There are no discontinued medications. [facility] follow up appointment(s):   Future Appointments   Date Time Provider Ramya Kang   6/20/2019 To Be Determined [facility]   6/24/2019 To Be Determined [facility] [address]   11/26/2019 11:30 AM Elin Laws MD [address]      Non-[facility] follow up appointment(s): Dr. Teresa Spence 6/27  [facility]:  offered and patient declined - information provided as resources.         Goals      Returns to baseline activity level.      06/17/19    - Dr. Teresa Spence 6/27  - Refused f/u with Dr. Beryle Levee and [address] information provided as resource   - Reviewed the following discharge instructions:    Weight bearing as tolerated   No driving    Leave dressing intact until follow up appt   No baths or swimming   Pat incision dry after showering      - Reviewed diabetic diet   - use your walker and take short walks in your home every 2 hours while away. - [facility] PT       Patient /family verbalized understanding. NN to f/u 1 week. AR          Understands red flags post discharge. 06/17/19    Spoke to patient and his wife (verfied on hipaa). Reviewed red flag s/s with patient, nausea, vomiting, inability to pass urine/stool, SOB, mental status change, chest pain, fever, bleeding from wound, increased swelling or reddness around wound, change in wound drainage, temp over 101.5, increased swelling of thigh, ankle, calf or foot, redness or pain in calf. Patient has large BM yesterday, advised patient to continue to take stool softener while on pain medication. Patient/family verbalized understanding and will contact MD/NN if red flag s/s arise. NN to f/u 1 week.  AR

## 2019-06-18 PROCEDURE — 3331090001 HH PPS REVENUE CREDIT

## 2019-06-18 PROCEDURE — 3331090002 HH PPS REVENUE DEBIT

## 2019-06-19 PROCEDURE — 3331090001 HH PPS REVENUE CREDIT

## 2019-06-19 PROCEDURE — 3331090002 HH PPS REVENUE DEBIT

## 2019-06-20 ENCOUNTER — HOME CARE VISIT (OUTPATIENT)
Dept: SCHEDULING | Facility: HOME HEALTH | Age: 79
End: 2019-06-20
Payer: MEDICARE

## 2019-06-20 PROCEDURE — 3331090001 HH PPS REVENUE CREDIT

## 2019-06-20 PROCEDURE — G0151 HHCP-SERV OF PT,EA 15 MIN: HCPCS

## 2019-06-20 PROCEDURE — 3331090002 HH PPS REVENUE DEBIT

## 2019-06-21 VITALS
HEART RATE: 68 BPM | OXYGEN SATURATION: 98 % | SYSTOLIC BLOOD PRESSURE: 120 MMHG | DIASTOLIC BLOOD PRESSURE: 64 MMHG | RESPIRATION RATE: 16 BRPM | TEMPERATURE: 97.8 F

## 2019-06-21 PROCEDURE — 3331090001 HH PPS REVENUE CREDIT

## 2019-06-21 PROCEDURE — 3331090002 HH PPS REVENUE DEBIT

## 2019-06-22 PROCEDURE — 3331090002 HH PPS REVENUE DEBIT

## 2019-06-22 PROCEDURE — 3331090001 HH PPS REVENUE CREDIT

## 2019-06-23 PROCEDURE — 3331090002 HH PPS REVENUE DEBIT

## 2019-06-23 PROCEDURE — 3331090001 HH PPS REVENUE CREDIT

## 2019-06-24 ENCOUNTER — HOME CARE VISIT (OUTPATIENT)
Dept: SCHEDULING | Facility: HOME HEALTH | Age: 79
End: 2019-06-24
Payer: MEDICARE

## 2019-06-24 PROCEDURE — 3331090001 HH PPS REVENUE CREDIT

## 2019-06-24 PROCEDURE — G0151 HHCP-SERV OF PT,EA 15 MIN: HCPCS

## 2019-06-24 PROCEDURE — 3331090002 HH PPS REVENUE DEBIT

## 2019-06-24 PROCEDURE — 3331090003 HH PPS REVENUE ADJ

## 2019-06-25 VITALS
OXYGEN SATURATION: 98 % | SYSTOLIC BLOOD PRESSURE: 122 MMHG | HEART RATE: 68 BPM | DIASTOLIC BLOOD PRESSURE: 70 MMHG | TEMPERATURE: 98 F | RESPIRATION RATE: 16 BRPM

## 2019-06-25 PROCEDURE — 3331090001 HH PPS REVENUE CREDIT

## 2019-06-25 PROCEDURE — 3331090002 HH PPS REVENUE DEBIT

## 2019-06-28 ENCOUNTER — PATIENT OUTREACH (OUTPATIENT)
Dept: INTERNAL MEDICINE CLINIC | Age: 79
End: 2019-06-28

## 2019-06-28 NOTE — PROGRESS NOTES
Goals      Returns to baseline activity level.      06/17/19    - Dr. Benjamin Yang 6/27  - Refused f/u with Dr. Hossein Hsieh and  [address] information provided as resource   - Reviewed the following discharge instructions:    Weight bearing as tolerated   No driving    Leave dressing intact until follow up appt   No baths or swimming   Pat incision dry after showering      - Reviewed diabetic diet   - use your walker and take short walks in your home every 2 hours while away. - [facility] PT       Patient /family verbalized understanding. NN to f/u 1 week. AR    07/01/19  Spoke to patients wife, verified on hipaa. Patient attended f/u with Dr. Benjamin Yang on 6/27 and reported that things were healing as normal. Patient will now be attended outpatient PT 2 times a week for 4 weeks. Patient continues to use walker and take short walks in the home as well as do daily exercises. Patient continues to refuse f/u with Dr. Hossein Hsieh or colleague. NN to f/u 1 week. AR        Understands red flags post discharge. 06/17/19    Spoke to patient and his wife (verfied on hipaa). Reviewed red flag s/s with patient, nausea, vomiting, inability to pass urine/stool, SOB, mental status change, chest pain, fever, bleeding from wound, increased swelling or reddness around wound, change in wound drainage, temp over 101.5, increased swelling of thigh, ankle, calf or foot, redness or pain in calf. Patient has large BM yesterday, advised patient to continue to take stool softener while on pain medication. Patient/family verbalized understanding and will contact MD/NN if red flag s/s arise. NN to f/u 1 week. AR    07/01/19  Spoke to patients wife. Reviewed red  Flag s/s and she states that patient is not experiencing any. Patient reports having trouble sleeping at night due to not being able to get comfortable, patients wife states that Dr. Benjamin Yang advised patient to start taking benadryl at night. Patient is going to start that tonight. Reminded patient to also use ice before bed and take pain medication. Reminded patient of red flag s/s that warrant f/u. Patient/family verbalized understanding and will contact MD/NN if red flag s/s arise. NN to f/u 1 week.  AR

## 2019-06-29 PROBLEM — Z86.010 HX OF COLONIC POLYPS: Chronic | Status: ACTIVE | Noted: 2019-05-28

## 2019-06-29 PROBLEM — K57.90 DIVERTICULOSIS: Status: ACTIVE | Noted: 2019-06-29

## 2019-07-18 ENCOUNTER — PATIENT OUTREACH (OUTPATIENT)
Dept: INTERNAL MEDICINE CLINIC | Age: 79
End: 2019-07-18

## 2019-07-18 NOTE — PROGRESS NOTES
Patient has graduated from the Transitions of Care Coordination  program on 07/18/19. Patient's symptoms are stable at this time. Patient/family has the ability to self-manage. Care management goals have been completed at this time. No further nurse navigator follow up scheduled. Goals Addressed                 This Visit's Progress     COMPLETED: Returns to baseline activity level.        06/17/19    - Dr. Dale Vasquez 6/27  - Refused f/u with Dr. Cayla Bolton and  [address] information provided as resource   - Reviewed the following discharge instructions:    Weight bearing as tolerated   No driving    Leave dressing intact until follow up appt   No baths or swimming   Pat incision dry after showering      - Reviewed diabetic diet   - use your walker and take short walks in your home every 2 hours while away. - [facility] PT       Patient /family verbalized understanding. NN to f/u 1 week. AR    07/01/19  Spoke to patients wife, verified on hipaa. Patient attended f/u with Dr. Dale Vasquez on 6/27 and reported that things were healing as normal. Patient will now be attended outpatient PT 2 times a week for 4 weeks. Patient continues to use walker and take short walks in the home as well as do daily exercises. Patient continues to refuse f/u with Dr. Cayla Bolton or colleague. NN to f/u 1 week. AR     07/18/19  Spoke to patient. Patient was discharged from [facility] PT and is now doing outpatient PT. Patient has another f/u with Dr. Dale Vasquez in a couple weeks. Patient reports no further questions/concerns for NN at this time. AR       COMPLETED: Understands red flags post discharge. 06/17/19    Spoke to patient and his wife (verfied on hipaa).  Reviewed red flag s/s with patient, nausea, vomiting, inability to pass urine/stool, SOB, mental status change, chest pain, fever, bleeding from wound, increased swelling or reddness around wound, change in wound drainage, temp over 101.5, increased swelling of thigh, ankle, calf or foot, redness or pain in calf. Patient has large BM yesterday, advised patient to continue to take stool softener while on pain medication. Patient/family verbalized understanding and will contact MD/NN if red flag s/s arise. NN to f/u 1 week. AR    07/01/19  Spoke to patients wife. Reviewed red  Flag s/s and she states that patient is not experiencing any. Patient reports having trouble sleeping at night due to not being able to get comfortable, patients wife states that Dr. Maritza Phan advised patient to start taking benadryl at night. Patient is going to start that tonight. Reminded patient to also use ice before bed and take pain medication. Reminded patient of red flag s/s that warrant f/u. Patient/family verbalized understanding and will contact MD/NN if red flag s/s arise. NN to f/u 1 week. AR            Pt has nurse navigator's contact information for any further questions, concerns, or needs.   Patients upcoming visits:    Future Appointments   Date Time Provider Ramya Kang   11/26/2019 11:30 AM MD JONAH [address]

## 2019-09-04 ENCOUNTER — TELEPHONE (OUTPATIENT)
Dept: INTERNAL MEDICINE CLINIC | Age: 79
End: 2019-09-04

## 2019-09-04 NOTE — TELEPHONE ENCOUNTER
Patient states he needs a call back to be advised what to do about persistent ear issues. Please call to discuss & advise.  Thank you

## 2019-09-04 NOTE — TELEPHONE ENCOUNTER
Called, spoke to pt. Two identifiers confirmed. Contact information provided to pt for Dr. Bina Proctor. Pt verbalized understanding of information discussed w/ no further questions at this time.

## 2019-10-17 ENCOUNTER — TELEPHONE (OUTPATIENT)
Dept: INTERNAL MEDICINE CLINIC | Age: 79
End: 2019-10-17

## 2019-10-17 NOTE — TELEPHONE ENCOUNTER
Melvin José MD  You; Hilda Cantu LPN 7 minutes ago (1:54 PM)     should be seen today or tomorrow--[facility]    Routing comment      Called, spoke to pt. Two identifiers confirmed. Notified pt of Dr. Darling Hinton recommendations. Pt verbalized understanding of information discussed w/ no further questions at this time.

## 2019-10-17 NOTE — TELEPHONE ENCOUNTER
Jemal Jalloh [facility]   Phone Number: [phone]             Level 1/Escalated Issue   Caller's first and last name and relationship (if not the patient): n/a   Best contact number(s): [phone]   What are the symptoms:  Chest pain on upper right side   Transfer successful - yes/no (include outcome):no- answered but advised me to send a message   Transfer declined - yes/no (include reason): Yes   Was caller advised to seek appropriate level of care - yes/no: Yes   Details to clarify the request: Pt stated he has had chest pains for several days. Pt requesting  to get in for an appt today.      Message [facility] & [facility]

## 2019-10-17 NOTE — TELEPHONE ENCOUNTER
Spoke with patient using two identifiers. Patient complaining of right upper chest pain x 1 week. No dizziness or SOB per patient. Only burning feeling right upper side of chest. Please advise .

## 2019-11-26 ENCOUNTER — OFFICE VISIT (OUTPATIENT)
Dept: INTERNAL MEDICINE CLINIC | Age: 79
End: 2019-11-26

## 2019-11-26 VITALS
HEIGHT: 70 IN | HEART RATE: 69 BPM | WEIGHT: 205 LBS | DIASTOLIC BLOOD PRESSURE: 61 MMHG | OXYGEN SATURATION: 96 % | RESPIRATION RATE: 16 BRPM | SYSTOLIC BLOOD PRESSURE: 115 MMHG | BODY MASS INDEX: 29.35 KG/M2 | TEMPERATURE: 97.7 F

## 2019-11-26 DIAGNOSIS — K63.5 POLYP OF COLON, UNSPECIFIED PART OF COLON, UNSPECIFIED TYPE: ICD-10-CM

## 2019-11-26 DIAGNOSIS — Z23 ENCOUNTER FOR IMMUNIZATION: ICD-10-CM

## 2019-11-26 DIAGNOSIS — I10 ESSENTIAL HYPERTENSION: ICD-10-CM

## 2019-11-26 DIAGNOSIS — Z00.00 MEDICARE ANNUAL WELLNESS VISIT, SUBSEQUENT: ICD-10-CM

## 2019-11-26 DIAGNOSIS — R73.09 ELEVATED GLUCOSE: Primary | ICD-10-CM

## 2019-11-26 DIAGNOSIS — E78.00 PURE HYPERCHOLESTEROLEMIA: ICD-10-CM

## 2019-11-26 DIAGNOSIS — E11.9 CONTROLLED TYPE 2 DIABETES MELLITUS WITHOUT COMPLICATION, WITHOUT LONG-TERM CURRENT USE OF INSULIN (HCC): ICD-10-CM

## 2019-11-26 DIAGNOSIS — I25.10 CORONARY ARTERY DISEASE INVOLVING NATIVE CORONARY ARTERY OF NATIVE HEART WITHOUT ANGINA PECTORIS: ICD-10-CM

## 2019-11-26 DIAGNOSIS — G62.9 NEUROPATHY: ICD-10-CM

## 2019-11-26 DIAGNOSIS — Z87.39 HX OF GOUT: ICD-10-CM

## 2019-11-26 LAB — HBA1C MFR BLD HPLC: 6.3 % (ref 4.8–5.6)

## 2019-11-26 RX ORDER — ISOSORBIDE MONONITRATE 30 MG/1
60 TABLET, EXTENDED RELEASE ORAL DAILY
COMMUNITY
End: 2021-12-07 | Stop reason: ALTCHOICE

## 2019-11-26 NOTE — PROGRESS NOTES
Chief Complaint   Patient presents with    Diabetes     6 month follow up    Diabetic Foot Exam     Routine    Annual Wellness Visit     annual   [facility]

## 2019-11-26 NOTE — PROGRESS NOTES
HISTORY OF PRESENT ILLNESS  Eugenie Dejesus is a 78 y.o. male. HPI     F/u HTN HLD elevated glucose, peripheral neuropathy , CADand medicare wellness--------------  S/p Right THR 6/14/19 Dr Dianne Dorman well per pt  Now may need knee replacement  Last a1c 6.5 LDL 46  a1c is 6.3 today  a1c today  Had burning right upper chest pain last month-----------------------  Sees Dr Júnior Ray for hx CD s/p cabg-had NST last month--ischemia in RCA distribution  Now on imdur which has helped  Due for flu shot and [id number]  No recent episodes of gout     last OV      Pt here to establish care  Moved from [facility] a few months ago  PCP was Dr Reese Tate  Hx HTN HLD gout CAD s/p cabg x 5 in  2001 and stent 2008  Has established here with Dr Suzie Lyons for cardiology and already had preop clearance visit last week for upcoming right hip surgery  Scheduled for right hip replacement 6-13-19-Dr Serene Jensen  Still able to walk up 1 flight of stairs  No cp or sob  No problems in the past with surgery or anesthesia  Hip pain is getting worse and not able to sleep at night despite tramadol   Has peripheral neuropathy in feet and legs and may want to establish with a neurologist in this area     Retired  -worked in [employer]       Patient Active Problem List    Diagnosis Date Noted    Diverticulosis 06/29/2019    Status post right hip replacement 06/13/2019    HTN (hypertension) 05/28/2019    Pure hypercholesterolemia 05/28/2019    Hx of gout 05/28/2019    Hx of colonic polyps 05/28/2019    Coronary artery disease involving native coronary artery of native heart without angina pectoris 05/28/2019    Rosacea 05/28/2019    Idiopathic peripheral neuropathy 05/28/2019     Current Outpatient Medications   Medication Sig Dispense Refill    isosorbide mononitrate ER (IMDUR) 30 mg tablet Take  by mouth daily.  gabapentin (NEURONTIN) 300 mg capsule Take 2 Caps by mouth three (3) times daily.  [facility] multivitamin (ONE A DAY) tablet Take 1 Tab by mouth daily.  omega 3-dha-epa-fish oil (FISH OIL) 100-160-1,000 mg cap Take  by mouth two (2) times a day. 2 tabs bid      pyridoxine, vitamin B6, (VITAMIN B-6) 100 mg tablet Take 100 mg by mouth daily.  cholecalciferol (VITAMIN D3) 1,000 unit cap Take 1,000 Units by mouth daily.  atorvastatin (LIPITOR) 80 mg tablet Take 1 Tab by mouth daily. (Patient taking differently: Take 80 mg by mouth nightly.) 90 Tab 3    losartan (COZAAR) 50 mg tablet Take 1 Tab by mouth daily. 90 Tab 3    allopurinol (ZYLOPRIM) 100 mg tablet Take 1 Tab by mouth daily. 90 Tab 3    atenolol (TENORMIN) 25 mg tablet Take 1 Tab by mouth daily. (Patient taking differently: Take 25 mg by mouth nightly.) 90 Tab 3     No Known Allergies   Lab Results   Component Value Date/Time    Hemoglobin A1c 6.5 (H) 05/28/2019 11:44 AM    Glucose 126 (H) 06/14/2019 03:42 AM    Glucose (POC) 119 (H) 06/13/2019 10:36 AM    LDL, calculated 46 05/28/2019 11:44 AM    Creatinine 1.09 06/14/2019 03:42 AM      Lab Results   Component Value Date/Time    Cholesterol, total 109 05/28/2019 11:44 AM    HDL Cholesterol 42 05/28/2019 11:44 AM    LDL, calculated 46 05/28/2019 11:44 AM    Triglyceride 103 05/28/2019 11:44 AM     Lab Results   Component Value Date/Time    GFR est non-AA >60 06/14/2019 03:42 AM    GFR est AA >60 06/14/2019 03:42 AM    Creatinine 1.09 06/14/2019 03:42 AM    BUN 13 06/14/2019 03:42 AM    Sodium 138 06/14/2019 03:42 AM    Potassium 4.0 06/14/2019 03:42 AM    Chloride 104 06/14/2019 03:42 AM    CO2 28 06/14/2019 03:42 AM        ROS    Physical Exam  Vitals signs and nursing note reviewed. Constitutional:       General: He is not in acute distress. Appearance: He is well-developed. Comments: Appears stated age   HENT:      Head: Normocephalic. Cardiovascular:      Rate and Rhythm: Normal rate and regular rhythm. Heart sounds: Normal heart sounds.    Pulmonary: Effort: Pulmonary effort is normal.      Breath sounds: Normal breath sounds. Abdominal:      Palpations: Abdomen is soft. Neurological:      Mental Status: He is alert. Comments:      Diabetic foot exam performed by Haydee Arora MD       Measurement  Response Nurse Comment Physician Comment  Monofilament  R - reduced sensation with micro filament  L - reduced sensation with micro filament    Pulse DP R - 2+ (normal)  L - 2+ (normal)    Pulse TP R - 2+ (normal)  L - 2+ (normal)    Structural deformity R - None  L - None    Skin Integrity / Deformity R - None  L - None       Reviewed by:               ASSESSMENT and PLAN  Diagnoses and all orders for this visit:    1. Elevated glucose  -     AMB POC HEMOGLOBIN A1C  -     MICROALBUMIN, UR, RAND W/ MICROALB/CREAT RATIO    2. Neuropathy  -     VITAMIN B12    3. Coronary artery disease involving native coronary artery of native heart without angina pectoris    4. Essential hypertension    5. Pure hypercholesterolemia    6. Encounter for immunization  -     PNEUMOCOCCAL POLYSACCHARIDE VACCINE, 23-VALENT, ADULT OR IMMUNOSUPPRESSED PT DOSE,    7. Hx of gout  -     URIC ACID    8. Polyp of colon, unspecified part of colon, unspecified type  -     REFERRAL TO GASTROENTEROLOGY    9. Controlled type 2 diabetes mellitus without complication, without long-term current use of insulin ([facility])  -      DIABETES FOOT EXAM      Follow-up and Dispositions    · Return in about 6 months (around 5/26/2020) for cad dm-2 htn hld. This is the Subsequent Medicare Annual Wellness Exam, performed 12 months or more after the Initial AWV or the last Subsequent AWV    I have reviewed the patient's medical history in detail and updated the computerized patient record.      History     Patient Active Problem List   Diagnosis Code    HTN (hypertension) I10    Pure hypercholesterolemia E78.00    Hx of gout Z87.39    Hx of colonic polyps Z86.010    Coronary artery disease involving native coronary artery of native heart without angina pectoris I25.10    Rosacea L71.9    Idiopathic peripheral neuropathy G60.9    Status post right hip replacement Z96.641    Diverticulosis K57.90     Past Medical History:   Diagnosis Date    Adverse effect of anesthesia     hallucinations/agitation after last surgery 2018 in hospital 3 days    Arthritis     knee and hip/right    CAD (coronary artery disease) 2001    Diabetes ([address]     Hgb A1C 6.1 5/2019 - No meds, dx Pre-DM being monitored by PCP    Hypercholesteremia     Hypertension     Nausea & vomiting     Sleep apnea     Borderline/ No CPAP      Past Surgical History:   Procedure Laterality Date    CARDIAC SURG PROCEDURE UNLIST  03/2001    Bypass    CARDIAC SURG PROCEDURE UNLIST  2008    stents    HX APPENDECTOMY      HX CORONARY ARTERY BYPASS GRAFT  2001    5v    HX GI      umbilical hernia x3    HX HIP REPLACEMENT Left 2017    HX KNEE ARTHROSCOPY Right 2007    right knee    TOTAL KNEE ARTHROPLASTY Left 2017    left hip replacement     Current Outpatient Medications   Medication Sig Dispense Refill    isosorbide mononitrate ER (IMDUR) 30 mg tablet Take  by mouth daily.  gabapentin (NEURONTIN) 300 mg capsule Take 2 Caps by mouth three (3) times daily. 540 Cap 3    multivitamin (ONE A DAY) tablet Take 1 Tab by mouth daily.  omega 3-dha-epa-fish oil (FISH OIL) 100-160-1,000 mg cap Take  by mouth two (2) times a day. 2 tabs bid      pyridoxine, vitamin B6, (VITAMIN B-6) 100 mg tablet Take 100 mg by mouth daily.  cholecalciferol (VITAMIN D3) 1,000 unit cap Take 1,000 Units by mouth daily.  atorvastatin (LIPITOR) 80 mg tablet Take 1 Tab by mouth daily. (Patient taking differently: Take 80 mg by mouth nightly.) 90 Tab 3    losartan (COZAAR) 50 mg tablet Take 1 Tab by mouth daily. 90 Tab 3    allopurinol (ZYLOPRIM) 100 mg tablet Take 1 Tab by mouth daily.  90 Tab 3    atenolol (TENORMIN) 25 mg tablet Take 1 Tab by mouth daily. (Patient taking differently: Take 25 mg by mouth nightly.) 90 Tab 3     No Known Allergies    Family History   Problem Relation Age of Onset    Cancer Mother      Social History     Tobacco Use    Smoking status: Never Smoker    Smokeless tobacco: Never Used   Substance Use Topics    Alcohol use: Yes     Alcohol/week: 3.0 standard drinks     Types: 3 Cans of beer per week     Frequency: 2-3 times a week     Drinks per session: 1 or 2     Binge frequency: Never       Depression Risk Factor Screening:     3 most recent PHQ Screens 11/26/2019   Little interest or pleasure in doing things Not at all   Feeling down, depressed, irritable, or hopeless Not at all   Total Score PHQ 2 0       Alcohol Risk Factor Screening (MALE > 65): Do you average more 1 drink per night or more than 7 drinks a week: No    In the past three months have you have had more than 4 drinks containing alcohol on one occasion: No      Functional Ability and Level of Safety:   Hearing: The patient wears hearing aids. Activities of Daily Living: The home contains: grab bars  Patient does total self care    Ambulation: with no difficulty    Fall Risk:  Fall Risk Assessment, last 12 mths 11/26/2019   Able to walk? Yes   Fall in past 12 months? No       Abuse Screen:  Patient is not abused    Cognitive Screening   Has your family/caregiver stated any concerns about your memory: no  Cognitive Screening: Normal - serial 3    Patient Care Team   Patient Care Team:  Jose Smith MD as PCP - General (Internal Medicine)  Jose Smith MD as PCP - [facility] Provider  Patrica Garvey MD (Orthopedic Surgery)    Assessment/Plan   Education and counseling provided:  Are appropriate based on today's review and evaluation  Pneumococcal Vaccine-pneumovax 23 today  shingrix recommended  Refer for colonoscopy-Dr Annmarie Payne    Diagnoses and all orders for this visit:    1.  Elevated glucose/DM-2  -     AMB POC HEMOGLOBIN A1C 6.3  -     MICROALBUMIN, UR, RAND W/ MICROALB/CREAT RATIO   Diet controlled DM-2    Foot exam today  2. Neuropathy  -     VITAMIN B12    3. Coronary artery disease involving native coronary artery of native heart without angina pectoris   On imdur for right CP    Will f/u with Dr Emilie Reynolds , cath if has increased CP  4. Essential hypertension   controlled  5. Pure hypercholesterolemia   LDL at goal on high dose statin  6. Encounter for immunization  -     PNEUMOCOCCAL POLYSACCHARIDE VACCINE, 23-VALENT, ADULT OR IMMUNOSUPPRESSED PT DOSE,    7. Hx of gout   Quiet on allopurinol   Uric acid level    8.  Hx colon polyp   Refer to GI MD        Health Maintenance Due   Topic Date Due    FOOT EXAM Q1  05/13/1950    MICROALBUMIN Q1  05/13/1950    EYE EXAM RETINAL OR DILATED  05/13/1950    Shingrix Vaccine Age 50> (1 of 2) 05/13/1990    GLAUCOMA SCREENING Q2Y  05/13/2005    Pneumococcal 65+ years (2 of 2 - PPSV23) 11/10/2016    MEDICARE YEARLY EXAM  05/30/2019    HEMOGLOBIN A1C Q6M  11/28/2019

## 2019-11-27 LAB
ALBUMIN/CREAT UR: 10.7 MG/G CREAT (ref 0–30)
CREAT UR-MCNC: 145.9 MG/DL
MICROALBUMIN UR-MCNC: 15.6 UG/ML
URATE SERPL-MCNC: 5.4 MG/DL (ref 3.7–8.6)
VIT B12 SERPL-MCNC: 436 PG/ML (ref 232–1245)

## 2019-12-16 ENCOUNTER — TELEPHONE (OUTPATIENT)
Dept: INTERNAL MEDICINE CLINIC | Age: 79
End: 2019-12-16

## 2019-12-16 DIAGNOSIS — R52 PAIN: Primary | ICD-10-CM

## 2019-12-16 RX ORDER — GABAPENTIN 300 MG/1
600 CAPSULE ORAL 3 TIMES DAILY
Qty: 540 CAP | Refills: 3 | Status: SHIPPED | OUTPATIENT
Start: 2019-12-16 | End: 2020-06-09

## 2020-02-03 RX ORDER — METRONIDAZOLE 7.5 MG/G
GEL TOPICAL 2 TIMES DAILY
Qty: 60 G | Refills: 0 | Status: SHIPPED | OUTPATIENT
Start: 2020-02-03 | End: 2020-02-03 | Stop reason: CLARIF

## 2020-02-03 RX ORDER — METRONIDAZOLE 10 MG/G
GEL TOPICAL DAILY
Qty: 45 G | Refills: 0 | Status: SHIPPED | OUTPATIENT
Start: 2020-02-03 | End: 2020-02-13 | Stop reason: SDUPTHER

## 2020-02-13 RX ORDER — METRONIDAZOLE 10 MG/G
GEL TOPICAL DAILY
Qty: 45 G | Refills: 0 | Status: SHIPPED | OUTPATIENT
Start: 2020-02-13 | End: 2022-03-15 | Stop reason: CLARIF

## 2020-04-20 ENCOUNTER — TELEPHONE (OUTPATIENT)
Dept: INTERNAL MEDICINE CLINIC | Age: 80
End: 2020-04-20

## 2020-04-20 ENCOUNTER — VIRTUAL VISIT (OUTPATIENT)
Dept: INTERNAL MEDICINE CLINIC | Age: 80
End: 2020-04-20

## 2020-04-20 VITALS
WEIGHT: 193 LBS | TEMPERATURE: 97.3 F | DIASTOLIC BLOOD PRESSURE: 66 MMHG | SYSTOLIC BLOOD PRESSURE: 122 MMHG | BODY MASS INDEX: 27.69 KG/M2 | HEART RATE: 59 BPM

## 2020-04-20 DIAGNOSIS — K57.92 DIVERTICULITIS: Primary | ICD-10-CM

## 2020-04-20 DIAGNOSIS — R10.32 LEFT LOWER QUADRANT ABDOMINAL PAIN: ICD-10-CM

## 2020-04-20 LAB
BASOPHILS # BLD AUTO: 0.1 X10E3/UL (ref 0–0.2)
BASOPHILS NFR BLD AUTO: 0 %
EOSINOPHIL # BLD AUTO: 0.1 X10E3/UL (ref 0–0.4)
EOSINOPHIL NFR BLD AUTO: 1 %
ERYTHROCYTE [DISTWIDTH] IN BLOOD BY AUTOMATED COUNT: 14.1 % (ref 11.6–15.4)
HCT VFR BLD AUTO: 43.2 % (ref 37.5–51)
HGB BLD-MCNC: 14.3 G/DL (ref 13–17.7)
IMM GRANULOCYTES # BLD AUTO: 0 X10E3/UL (ref 0–0.1)
IMM GRANULOCYTES NFR BLD AUTO: 0 %
LYMPHOCYTES # BLD AUTO: 2.7 X10E3/UL (ref 0.7–3.1)
LYMPHOCYTES NFR BLD AUTO: 24 %
MCH RBC QN AUTO: 28.7 PG (ref 26.6–33)
MCHC RBC AUTO-ENTMCNC: 33.1 G/DL (ref 31.5–35.7)
MCV RBC AUTO: 87 FL (ref 79–97)
MONOCYTES # BLD AUTO: 1.1 X10E3/UL (ref 0.1–0.9)
MONOCYTES NFR BLD AUTO: 10 %
NEUTROPHILS # BLD AUTO: 7.3 X10E3/UL (ref 1.4–7)
NEUTROPHILS NFR BLD AUTO: 65 %
PLATELET # BLD AUTO: 273 X10E3/UL (ref 150–450)
RBC # BLD AUTO: 4.98 X10E6/UL (ref 4.14–5.8)
WBC # BLD AUTO: 11.1 X10E3/UL (ref 3.4–10.8)

## 2020-04-20 RX ORDER — METRONIDAZOLE 500 MG/1
500 TABLET ORAL 3 TIMES DAILY
Qty: 30 TAB | Refills: 0 | Status: SHIPPED | OUTPATIENT
Start: 2020-04-20 | End: 2021-06-02

## 2020-04-20 RX ORDER — CIPROFLOXACIN 500 MG/1
500 TABLET ORAL 2 TIMES DAILY
Qty: 20 TAB | Refills: 0 | Status: SHIPPED | OUTPATIENT
Start: 2020-04-20 | End: 2021-06-02

## 2020-04-20 RX ORDER — AZITHROMYCIN 250 MG/1
250 TABLET, FILM COATED ORAL SEE ADMIN INSTRUCTIONS
Qty: 6 TAB | Refills: 0 | Status: SHIPPED | OUTPATIENT
Start: 2020-04-20 | End: 2020-04-20 | Stop reason: CLARIF

## 2020-04-20 RX ORDER — GUAIFENESIN 100 MG/5ML
81 LIQUID (ML) ORAL DAILY
COMMUNITY
End: 2020-04-20 | Stop reason: CLARIF

## 2020-04-20 RX ORDER — ZINC GLUCONATE 10 MG
LOZENGE ORAL
COMMUNITY
End: 2020-04-20 | Stop reason: CLARIF

## 2020-04-20 NOTE — TELEPHONE ENCOUNTER
Called, spoke to pt. Two identifiers confirmed. VV scheduled for today @ 315 with dr. Gabby Smith. Pt verbalized understanding of information discussed w/ no further questions at this time.

## 2020-04-20 NOTE — PROGRESS NOTES
HISTORY OF PRESENT ILLNESS  Babar Brandon is a 78 y.o. male. HPI       An electronic signature was used to authenticate this note. .      Services were provided through a video synchronous discussion virtually to substitute for in-person encounter. --Ford Cassidy MD on 4/20/2020 at 3:20 PM    An electronic signature was used to authenticate this note. Acute care VV    Pt c/o abdominal pain 10/10 today  Started 1 week ago intermittent in LLQ   Some bloating  No f.c  Reports BM are normal yesterday and today  Ate popcorn then symptoms started  Temp97.3  And bp 122/60 today    Hx diverticulitis in 2016'    Saw Dr Claudean Koh last month for FH colon cancer --had polyps on last colonscopy in 2015 in [facility]   last OV  F/u HTN HLD elevated glucose, peripheral neuropathy , CADand medicare wellness--------------  S/p Right THR 6/14/19 Dr Lucía laonso per pt  Now may need knee replacement  Last a1c 6.5 LDL 46  a1c is 6.3 today  a1c today  Had burning right upper chest pain last month-----------------------  Sees Dr Annmarie Austin for hx CD s/p cabg-had NST last month--ischemia in RCA distribution  Now on imdur which has helped  Due for flu shot and [id number]  No recent episodes of gout    Patient Active Problem List    Diagnosis Date Noted    Diverticulosis 06/29/2019    Status post right hip replacement 06/13/2019    HTN (hypertension) 05/28/2019    Pure hypercholesterolemia 05/28/2019    Hx of gout 05/28/2019    Hx of colonic polyps 05/28/2019    Coronary artery disease involving native coronary artery of native heart without angina pectoris 05/28/2019    Rosacea 05/28/2019    Idiopathic peripheral neuropathy 05/28/2019     Current Outpatient Medications   Medication Sig Dispense Refill    ciprofloxacin HCl (CIPRO) 500 mg tablet Take 1 Tab by mouth two (2) times a day. 20 Tab 0    metroNIDAZOLE (FLAGYL) 500 mg tablet Take 1 Tab by mouth three (3) times daily.  30 Tab 0    metroNIDAZOLE (METROGEL) 1 % topical gel Apply  to affected area daily. Use a thin layer to affected areas after washing 45 g 0    gabapentin (NEURONTIN) 300 mg capsule Take 2 Caps by mouth three (3) times daily. 540 Cap 3    isosorbide mononitrate ER (IMDUR) 30 mg tablet Take  by mouth daily.  multivitamin (ONE A DAY) tablet Take 1 Tab by mouth daily.  omega 3-dha-epa-fish oil (FISH OIL) 100-160-1,000 mg cap Take  by mouth two (2) times a day. 2 tabs bid      pyridoxine, vitamin B6, (VITAMIN B-6) 100 mg tablet Take 100 mg by mouth daily.  cholecalciferol (VITAMIN D3) 1,000 unit cap Take 1,000 Units by mouth daily.  atorvastatin (LIPITOR) 80 mg tablet Take 1 Tab by mouth daily. (Patient taking differently: Take 80 mg by mouth nightly.) 90 Tab 3    losartan (COZAAR) 50 mg tablet Take 1 Tab by mouth daily. 90 Tab 3    allopurinol (ZYLOPRIM) 100 mg tablet Take 1 Tab by mouth daily. 90 Tab 3    atenolol (TENORMIN) 25 mg tablet Take 1 Tab by mouth daily. (Patient taking differently: Take 25 mg by mouth nightly.) 90 Tab 3     No Known Allergies   Lab Results   Component Value Date/Time    GFR est non-AA >60 06/14/2019 03:42 AM    GFR est AA >60 06/14/2019 03:42 AM    Creatinine 1.09 06/14/2019 03:42 AM    BUN 13 06/14/2019 03:42 AM    Sodium 138 06/14/2019 03:42 AM    Potassium 4.0 06/14/2019 03:42 AM    Chloride 104 06/14/2019 03:42 AM    CO2 28 06/14/2019 03:42 AM        ROS    Physical Exam    ASSESSMENT and PLAN  Diagnoses and all orders for this visit:    1. Diverticulitis  -     CBC WITH AUTOMATED DIFF  -     ciprofloxacin HCl (CIPRO) 500 mg tablet; Take 1 Tab by mouth two (2) times a day. -     metroNIDAZOLE (FLAGYL) 500 mg tablet; Take 1 Tab by mouth three (3) times daily. Probable diverticulitis   To ER for fever or increased pain--pt  verbalized understanding and agreement    Clear liquids for now   Start oral abx  2.  Left lower quadrant abdominal pain  -     CBC WITH AUTOMATED DIFF  -     ciprofloxacin HCl (CIPRO) 500 mg tablet; Take 1 Tab by mouth two (2) times a day. -     metroNIDAZOLE (FLAGYL) 500 mg tablet; Take 1 Tab by mouth three (3) times daily.

## 2020-04-20 NOTE — PATIENT INSTRUCTIONS
This is an established visit conducted via telemedicine. The patient has been instructed that this meets HIPAA criteria and acknowledges and agrees to this method of visitation. Marcus Miner [place] 
[birth year] 
8:78 PM 
Chief Complaint Patient presents with  Abdominal Pain   LLQ pain and middle of abdominal

## 2020-04-20 NOTE — TELEPHONE ENCOUNTER
#[phone] pt states he believes he has diverticulitis again. Pt had this in 2016 and was treated by his previous pcp. Pt has lower abdominal pain that has kept him up all night. Last night being the worst.  Pt states this has gone on for about a week now. What can pt do? Can you prescribe an antibiotic for this today? [facility] on file.      Please call pt

## 2020-04-21 NOTE — PROGRESS NOTES
Discussed results--:LLQ pain down to 5/10. Will stay on liquids/broth , abx and advance diet slowly if further improved tomorrow.

## 2020-04-24 ENCOUNTER — HOSPITAL ENCOUNTER (OUTPATIENT)
Dept: CT IMAGING | Age: 80
Discharge: HOME | End: 2020-04-24
Attending: INTERNAL MEDICINE
Payer: MEDICARE

## 2020-04-24 ENCOUNTER — APPOINTMENT (OUTPATIENT)
Dept: CT IMAGING | Age: 80
End: 2020-04-24
Attending: INTERNAL MEDICINE
Payer: MEDICARE

## 2020-04-24 DIAGNOSIS — R10.32 LLQ PAIN: Primary | ICD-10-CM

## 2020-04-24 DIAGNOSIS — R10.32 LLQ PAIN: ICD-10-CM

## 2020-04-24 LAB — CREAT BLD-MCNC: 1 MG/DL (ref 0.6–1.3)

## 2020-04-24 PROCEDURE — 74011636320 HC RX REV CODE- 636/320: Performed by: INTERNAL MEDICINE

## 2020-04-24 PROCEDURE — 74177 CT ABD & PELVIS W/CONTRAST: CPT

## 2020-04-24 PROCEDURE — 82565 ASSAY OF CREATININE: CPT

## 2020-04-24 RX ORDER — SODIUM CHLORIDE 0.9 % (FLUSH) 0.9 %
10 SYRINGE (ML) INJECTION
Status: COMPLETED | OUTPATIENT
Start: 2020-04-24 | End: 2020-04-24

## 2020-04-24 RX ADMIN — IOPAMIDOL 100 ML: 755 INJECTION, SOLUTION INTRAVENOUS at 14:01

## 2020-04-24 RX ADMIN — Medication 10 ML: at 14:01

## 2020-04-24 RX ADMIN — IOHEXOL 50 ML: 240 INJECTION, SOLUTION INTRATHECAL; INTRAVASCULAR; INTRAVENOUS; ORAL at 14:01

## 2020-04-24 NOTE — PROGRESS NOTES
D/w pt and wfe by phone. Will stay on clears and take oral abx -cipro and flagy;. CT was ordered d/t new back pain and diarrhea but pain has decreased from 10/10 to 5/10 improving. Will take imodium AD prn . Instructed to go ER if worse and to update me on Monday.

## 2020-06-10 ENCOUNTER — ANESTHESIA EVENT (OUTPATIENT)
Dept: ENDOSCOPY | Age: 80
End: 2020-06-10
Payer: MEDICARE

## 2020-06-10 NOTE — ANESTHESIA PREPROCEDURE EVALUATION
Relevant Problems   No relevant active problems       Anesthetic History   No history of anesthetic complications  PONV          Review of Systems / Medical History  Patient summary reviewed, nursing notes reviewed and pertinent labs reviewed    Pulmonary  Within defined limits      Sleep apnea: No treatment           Neuro/Psych   Within defined limits           Cardiovascular    Hypertension    Angina      CAD, cardiac stents, CABG and hyperlipidemia    Exercise tolerance: <4 METS  Comments: 10/2019 Stress test negative for ischemia   GI/Hepatic/Renal  Within defined limits             Comments: Diverticular disease Endo/Other  Within defined limits  Diabetes: well controlled, type 2    Arthritis     Other Findings   Comments: Idiopathic peripheral neuropathy         Physical Exam    Airway  Mallampati: II  TM Distance: > 6 cm  Neck ROM: normal range of motion   Mouth opening: Normal     Cardiovascular  Regular rate and rhythm,  S1 and S2 normal,  no murmur, click, rub, or gallop  Rhythm: regular  Rate: normal         Dental    Dentition: Upper dentition intact and Lower dentition intact     Pulmonary  Breath sounds clear to auscultation               Abdominal  GI exam deferred       Other Findings            Anesthetic Plan    ASA: 3  Anesthesia type: total IV anesthesia and MAC          Induction: Intravenous  Anesthetic plan and risks discussed with: Patient      Conventional Grade 1 view for hip replacement.     No longer taking a beta blocker

## 2020-06-11 ENCOUNTER — HOSPITAL ENCOUNTER (OUTPATIENT)
Age: 80
Setting detail: OUTPATIENT SURGERY
Discharge: HOME | End: 2020-06-11
Attending: INTERNAL MEDICINE | Admitting: INTERNAL MEDICINE
Payer: MEDICARE

## 2020-06-11 ENCOUNTER — ANESTHESIA (OUTPATIENT)
Dept: ENDOSCOPY | Age: 80
End: 2020-06-11
Payer: MEDICARE

## 2020-06-11 VITALS
OXYGEN SATURATION: 97 % | RESPIRATION RATE: 25 BRPM | HEART RATE: 63 BPM | BODY MASS INDEX: 27.77 KG/M2 | SYSTOLIC BLOOD PRESSURE: 139 MMHG | TEMPERATURE: 97.8 F | DIASTOLIC BLOOD PRESSURE: 87 MMHG | HEIGHT: 70 IN | WEIGHT: 194 LBS

## 2020-06-11 PROCEDURE — 76060000031 HC ANESTHESIA FIRST 0.5 HR: Performed by: INTERNAL MEDICINE

## 2020-06-11 PROCEDURE — 88305 TISSUE EXAM BY PATHOLOGIST: CPT

## 2020-06-11 PROCEDURE — 74011000250 HC RX REV CODE- 250: Performed by: NURSE ANESTHETIST, CERTIFIED REGISTERED

## 2020-06-11 PROCEDURE — 77030019988 HC FCPS ENDOSC DISP BSC -B: Performed by: INTERNAL MEDICINE

## 2020-06-11 PROCEDURE — 74011250636 HC RX REV CODE- 250/636: Performed by: INTERNAL MEDICINE

## 2020-06-11 PROCEDURE — 76040000019: Performed by: INTERNAL MEDICINE

## 2020-06-11 PROCEDURE — 74011250636 HC RX REV CODE- 250/636: Performed by: NURSE ANESTHETIST, CERTIFIED REGISTERED

## 2020-06-11 RX ORDER — FLUMAZENIL 0.1 MG/ML
0.2 INJECTION INTRAVENOUS
Status: DISCONTINUED | OUTPATIENT
Start: 2020-06-11 | End: 2020-06-11 | Stop reason: HOSPADM

## 2020-06-11 RX ORDER — SODIUM CHLORIDE 0.9 % (FLUSH) 0.9 %
5-40 SYRINGE (ML) INJECTION AS NEEDED
Status: DISCONTINUED | OUTPATIENT
Start: 2020-06-11 | End: 2020-06-11 | Stop reason: HOSPADM

## 2020-06-11 RX ORDER — ATROPINE SULFATE 0.1 MG/ML
0.5 INJECTION INTRAVENOUS
Status: DISCONTINUED | OUTPATIENT
Start: 2020-06-11 | End: 2020-06-11 | Stop reason: HOSPADM

## 2020-06-11 RX ORDER — LIDOCAINE HYDROCHLORIDE 20 MG/ML
INJECTION, SOLUTION EPIDURAL; INFILTRATION; INTRACAUDAL; PERINEURAL AS NEEDED
Status: DISCONTINUED | OUTPATIENT
Start: 2020-06-11 | End: 2020-06-11 | Stop reason: HOSPADM

## 2020-06-11 RX ORDER — SODIUM CHLORIDE 0.9 % (FLUSH) 0.9 %
5-40 SYRINGE (ML) INJECTION EVERY 8 HOURS
Status: DISCONTINUED | OUTPATIENT
Start: 2020-06-11 | End: 2020-06-11 | Stop reason: HOSPADM

## 2020-06-11 RX ORDER — DEXTROMETHORPHAN/PSEUDOEPHED 2.5-7.5/.8
1.2 DROPS ORAL
Status: DISCONTINUED | OUTPATIENT
Start: 2020-06-11 | End: 2020-06-11 | Stop reason: HOSPADM

## 2020-06-11 RX ORDER — MIDAZOLAM HYDROCHLORIDE 1 MG/ML
.25-5 INJECTION, SOLUTION INTRAMUSCULAR; INTRAVENOUS
Status: DISCONTINUED | OUTPATIENT
Start: 2020-06-11 | End: 2020-06-11 | Stop reason: HOSPADM

## 2020-06-11 RX ORDER — PROPOFOL 10 MG/ML
INJECTION, EMULSION INTRAVENOUS AS NEEDED
Status: DISCONTINUED | OUTPATIENT
Start: 2020-06-11 | End: 2020-06-11 | Stop reason: HOSPADM

## 2020-06-11 RX ORDER — NALOXONE HYDROCHLORIDE 0.4 MG/ML
0.4 INJECTION, SOLUTION INTRAMUSCULAR; INTRAVENOUS; SUBCUTANEOUS
Status: DISCONTINUED | OUTPATIENT
Start: 2020-06-11 | End: 2020-06-11 | Stop reason: HOSPADM

## 2020-06-11 RX ORDER — EPINEPHRINE 0.1 MG/ML
1 INJECTION INTRACARDIAC; INTRAVENOUS
Status: DISCONTINUED | OUTPATIENT
Start: 2020-06-11 | End: 2020-06-11 | Stop reason: HOSPADM

## 2020-06-11 RX ORDER — SODIUM CHLORIDE 9 MG/ML
75 INJECTION, SOLUTION INTRAVENOUS CONTINUOUS
Status: DISCONTINUED | OUTPATIENT
Start: 2020-06-11 | End: 2020-06-11 | Stop reason: HOSPADM

## 2020-06-11 RX ORDER — FENTANYL CITRATE 50 UG/ML
25 INJECTION, SOLUTION INTRAMUSCULAR; INTRAVENOUS
Status: DISCONTINUED | OUTPATIENT
Start: 2020-06-11 | End: 2020-06-11 | Stop reason: HOSPADM

## 2020-06-11 RX ORDER — GLYCOPYRROLATE 0.2 MG/ML
INJECTION INTRAMUSCULAR; INTRAVENOUS AS NEEDED
Status: DISCONTINUED | OUTPATIENT
Start: 2020-06-11 | End: 2020-06-11 | Stop reason: HOSPADM

## 2020-06-11 RX ADMIN — PROPOFOL 20 MG: 10 INJECTION, EMULSION INTRAVENOUS at 13:50

## 2020-06-11 RX ADMIN — SODIUM CHLORIDE 75 ML/HR: 900 INJECTION, SOLUTION INTRAVENOUS at 11:42

## 2020-06-11 RX ADMIN — PROPOFOL 20 MG: 10 INJECTION, EMULSION INTRAVENOUS at 13:47

## 2020-06-11 RX ADMIN — GLYCOPYRROLATE 0.2 MG: 0.2 INJECTION, SOLUTION INTRAMUSCULAR; INTRAVENOUS at 13:47

## 2020-06-11 RX ADMIN — PROPOFOL 80 MG: 10 INJECTION, EMULSION INTRAVENOUS at 13:40

## 2020-06-11 RX ADMIN — PROPOFOL 20 MG: 10 INJECTION, EMULSION INTRAVENOUS at 13:41

## 2020-06-11 RX ADMIN — PROPOFOL 20 MG: 10 INJECTION, EMULSION INTRAVENOUS at 13:51

## 2020-06-11 RX ADMIN — LIDOCAINE HYDROCHLORIDE 40 MG: 20 INJECTION, SOLUTION EPIDURAL; INFILTRATION; INTRACAUDAL; PERINEURAL at 13:40

## 2020-06-11 RX ADMIN — PROPOFOL 20 MG: 10 INJECTION, EMULSION INTRAVENOUS at 13:45

## 2020-06-11 RX ADMIN — PROPOFOL 20 MG: 10 INJECTION, EMULSION INTRAVENOUS at 13:43

## 2020-06-11 RX ADMIN — PROPOFOL 20 MG: 10 INJECTION, EMULSION INTRAVENOUS at 13:48

## 2020-06-11 NOTE — PROCEDURES
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  Pre-operative Diagnosis: see indication above  Post-operative Diagnosis:  See findings below  :  Tracy Olmos MD  Referring Provider: Genevieve Medina MD    Exam:  Airway: clear, no airway problems anticipated  Heart: RRR, without gallops or rubs  Lungs: clear bilaterally without wheezes, crackles, or rhonchi  Abdomen: soft, nontender, nondistended, bowel sounds present  Mental Status: awake, alert and oriented to person, place and time    Sedation:  MAC anesthesia Propofol 220mg IV  Procedure Details:  After informed consent was obtained with all risks and benefits of procedure explained and preoperative exam completed, the patient was taken to the endoscopy suite and placed in the left lateral decubitus position. Upon sequential sedation as per above, a digital rectal exam was performed demonstrating internal hemorrhoids. The Olympus videocolonoscope  was inserted in the rectum and carefully advanced to the cecum, which was identified by the ileocecal valve and appendiceal orifice. The quality of preparation was adequate. The colonoscope was slowly withdrawn with careful evaluation between folds. Retroflexion in the rectum was completed demonstrating internal hemorrhoids. Findings:     -Extensive diverticulosis  -Small grade 1 internal hemorrhoids  -No colon masses, polyps, or inflammation noted    Specimen Removed:  None  Complications: None. EBL:  None. Impression:    -Extensive diverticulosis  -Small grade 1 internal hemorrhoids  -No colon masses, polyps, or inflammation noted    Recommendations: --Follow up with primary care physician. High fiber diet. Resume normal medication(s).      -No further colonoscopy indicated    Discharge Disposition:  Home in the company of a  when able to ambulate.     Josi Murillo MD

## 2020-06-11 NOTE — ANESTHESIA POSTPROCEDURE EVALUATION
Procedure(s):  ESOPHAGOGASTRODUODENOSCOPY (EGD)  COLONOSCOPY  ESOPHAGOGASTRODUODENAL (EGD) BIOPSY. total IV anesthesia, MAC    Anesthesia Post Evaluation        Patient location during evaluation: PACU  Note status: Adequate. Level of consciousness: responsive to verbal stimuli and sleepy but conscious  Pain management: satisfactory to patient  Airway patency: patent  Anesthetic complications: no  Cardiovascular status: acceptable  Respiratory status: acceptable  Hydration status: acceptable  Comments: +Post-Anesthesia Evaluation and Assessment    Patient: Zoila Ren MRN: [id number]  SSN: [id number]   YOB: 1940  Age: [de-identified] y.o. Sex: male      Cardiovascular Function/Vital Signs    /68   Pulse (!) 59   Temp 36.6 °C (97.8 °F)   Resp 22   Ht 5' 10\" (1.778 m)   Wt 88 kg (194 lb)   SpO2 93%   BMI 27.84 kg/m²     Patient is status post Procedure(s):  ESOPHAGOGASTRODUODENOSCOPY (EGD)  COLONOSCOPY  ESOPHAGOGASTRODUODENAL (EGD) BIOPSY. Nausea/Vomiting: Controlled. Postoperative hydration reviewed and adequate. Pain:  Pain Scale 1: Numeric (0 - 10) (06/11/20 1403)  Pain Intensity 1: 0 (06/11/20 1403)   Managed. Neurological Status: At baseline. Mental Status and Level of Consciousness: Arousable. Pulmonary Status:   O2 Device: Room air (06/11/20 1410)   Adequate oxygenation and airway patent. Complications related to anesthesia: None    Post-anesthesia assessment completed. No concerns. Signed By: Jsoe Gibson MD    6/11/2020  Post anesthesia nausea and vomiting:  controlled      INITIAL Post-op Vital signs:   Vitals Value Taken Time   /68 6/11/2020  2:17 PM   Temp 36.6 °C (97.8 °F) 6/11/2020  2:05 PM   Pulse 62 6/11/2020  2:19 PM   Resp 16 6/11/2020  2:19 PM   SpO2 95 % 6/11/2020  2:19 PM   Vitals shown include unvalidated device data.

## 2020-06-11 NOTE — ROUTINE PROCESS
Radha Mazariegos  1940  [id number]    Situation:  Verbal report received from: Chrissy Garner RN  Procedure: Procedure(s):  ESOPHAGOGASTRODUODENOSCOPY (EGD)  COLONOSCOPY  ESOPHAGOGASTRODUODENAL (EGD) BIOPSY    Background:    Preoperative diagnosis: hx marin syndrome  Postoperative diagnosis: EGD: family history of Marin Syndrome  Colon:Diverticulosis, hemorrhoids    :  Dr. Angele Aschoff  Assistant(s): Endoscopy Technician-1: Michell Lyles  Endoscopy RN-1: Estefani Kidney    Specimens:   ID Type Source Tests Collected by Time Destination   1 : biopsy Preservative Duodenum  Katlyn Benitez MD 6/11/2020 1340 Pathology   2 : bioppsy Preservative Gastric  Katlyn Benitez MD 6/11/2020 1342 Pathology   3 : Brenda Mckeon Biopsy Preservative   Katlyn Benitez MD 6/11/2020 1344 Pathology     H. Pylori  no    Assessment:  Intra-procedure medications         Anesthesia gave intra-procedure sedation and medications, see anesthesia flow sheet yes    Intravenous fluids: NS@ KVO     Vital signs stable       Abdominal assessment: round and soft       Recommendation:  Discharge patient per MD order.     Family or Friend Chelle Wife  Permission to share finding with family or friend yes

## 2020-06-11 NOTE — PERIOP NOTES
Endoscope was pre-cleaned at bedside immediately following procedure by ASHLEY Cortez   Medications     Medication Rate/Dose/Volume Action Route Date Time   Administering User Audit    glycopyrrolate 0.2 mg/mL (mg) 0.2 mg Given IntraVENous 06/11/20  1347  Chalmer Post, CRNA     lidocaine (PF) 2% (mg) 40 mg Given IntraVENous 06/11/20  1340  Moris Wilsonlist S, CRNA     propofol 10 mg/mL (mg) 80 mg Given IntraVENous 06/11/20  1340  Chalmer Post, CRNA      20 mg Given IntraVENous  1341  Chalmer Post, CRNA      20 mg Given IntraVENous  1343  Chalmer Post, CRNA      20 mg Given IntraVENous  1345  Chalmer Post, CRNA      20 mg Given IntraVENous  6813  Chalmer Post, CRNA      20 mg Given IntraVENous  1348  Chalmer Post, CRNA      20 mg Given IntraVENous  1350  Chalmer Post, CRNA      20 mg Given IntraVENous  1351  Chalmer Post, CRNA     0.9% sodium chloride infusion (mL) 300 mL Anesthesia Volume Adjustment IntraVENous 06/11/20  1356  Chalmer Post, CRNA     Dosing weight:  88 kg

## 2020-06-11 NOTE — H&P
Gastroenterology Outpatient History and Physical    Patient: Marito Downey    Physician: Luis Angel Beebe MD    Chief Complaint: Linnea Mejia Marin Syndrome  History of Present Illness: [de-identified] M with Fam hx Marin Syndrome and pers hx multiple colon polyps. History:  Past Medical History:   Diagnosis Date    Adverse effect of anesthesia     hallucinations/agitation after last surgery 2018 in hospital 3 days    Arthritis     knee and hip/right    CAD (coronary artery disease) 2001    Diabetes ([address]     Hgb A1C 6.1 5/2019 - No meds, dx Pre-DM being monitored by PCP    Hypercholesteremia     Hypertension     Nausea & vomiting     Sleep apnea     Borderline/ No CPAP      Past Surgical History:   Procedure Laterality Date    CARDIAC SURG PROCEDURE UNLIST  03/2001    Bypass    CARDIAC SURG PROCEDURE UNLIST  2008    stents    HX APPENDECTOMY      HX CORONARY ARTERY BYPASS GRAFT  2001    5v    HX GI      umbilical hernia x3    HX HIP REPLACEMENT Left 2017    HX KNEE ARTHROSCOPY Right 2007    right knee    TOTAL KNEE ARTHROPLASTY Left 2017    left hip replacement      Social History     Socioeconomic History    Marital status:      Spouse name: Not on file    Number of children: Not on file    Years of education: Not on file    Highest education level: Not on file   Tobacco Use    Smoking status: Never Smoker    Smokeless tobacco: Never Used   Substance and Sexual Activity    Alcohol use:  Yes     Alcohol/week: 3.0 standard drinks     Types: 3 Cans of beer per week     Frequency: 2-3 times a week     Drinks per session: 1 or 2     Binge frequency: Never    Drug use: Never    Sexual activity: Not Currently      Family History   Problem Relation Age of Onset    Cancer Mother       Patient Active Problem List   Diagnosis Code    HTN (hypertension) I10    Pure hypercholesterolemia E78.00    Hx of gout Z87.39    Hx of colonic polyps Z86.010    Coronary artery disease involving native coronary artery of native heart without angina pectoris I25.10    Rosacea L71.9    Idiopathic peripheral neuropathy G60.9    Status post right hip replacement Z96.641    Diverticulosis K57.90       Allergies: No Known Allergies  Medications:   Prior to Admission medications    Medication Sig Start Date End Date Taking? Authorizing Provider   gabapentin (NEURONTIN) 300 mg capsule Take 2 capsules by mouth 3 times a day 6/9/20  Yes Sahwnee Hogue MD   atenoloL (TENORMIN) 25 mg tablet Take 1 tablet by mouth daily 6/9/20  Yes Shawnee Hogue MD   losartan (COZAAR) 50 mg tablet Take 1 tablet by mouth daily 6/9/20   Shawnee Hogue MD   atorvastatin (LIPITOR) 80 mg tablet Take 1 tablet by mouth daily 6/9/20   Shawnee Hogue MD   allopurinoL (ZYLOPRIM) 100 mg tablet Take 1 tablet by mouth daily 6/9/20   Shawnee Hogue MD   ciprofloxacin HCl (CIPRO) 500 mg tablet Take 1 Tab by mouth two (2) times a day. 4/20/20   Shawnee Hogue MD   metroNIDAZOLE (FLAGYL) 500 mg tablet Take 1 Tab by mouth three (3) times daily. 4/20/20   Shawnee Hogue MD   metroNIDAZOLE (METROGEL) 1 % topical gel Apply  to affected area daily. Use a thin layer to affected areas after washing 2/13/20   Shawnee Hogue MD   isosorbide mononitrate ER (IMDUR) 30 mg tablet Take  by mouth daily. Provider, Historical   multivitamin (ONE A DAY) tablet Take 1 Tab by mouth daily. Provider, Historical   omega 3-dha-epa-fish oil (FISH OIL) 100-160-1,000 mg cap Take  by mouth two (2) times a day. 2 tabs bid    Provider, Historical   pyridoxine, vitamin B6, (VITAMIN B-6) 100 mg tablet Take 100 mg by mouth daily. Provider, Historical   cholecalciferol (VITAMIN D3) 1,000 unit cap Take 1,000 Units by mouth daily. Provider, Historical     Physical Exam:   Vital Signs: Blood pressure 143/77, pulse (!) 58, temperature 98.2 °F (36.8 °C), resp. rate 14, height 5' 10\" (1.778 m), weight 88 kg (194 lb), SpO2 98 %.   General: well developed, well nourished   HEENT: unremarkable   Heart: regular rhythm no mumur    Lungs: clear   Abdominal:  benign   Neurological: unremarkable   Extremities: no edema     Findings/Diagnosis: Fam hx Marin Syndrome  Plan of Care/Planned Procedure: EGD/Colonsocopy with conscious/deep sedation    Signed:  Vince Calvert MD 6/11/2020

## 2020-06-11 NOTE — DISCHARGE INSTRUCTIONS
Marito Downey  [id number]  1940    EGD/COLON DISCHARGE INSTRUCTIONS  Discomfort:  Redness at IV site- apply warm compress to area; if redness or soreness persist- contact your physician  There may be a slight amount of blood passed from the rectum  Gaseous discomfort- walking, belching will help relieve any discomfort  You may not operate a vehicle for 12 hours  You may not engage in an occupation involving machinery or appliances for rest of today  You may not drink alcoholic beverages for at least 12 hours  Avoid making any critical decisions for at least 24 hour  DIET:   High fiber diet. - however -  remember your colon is empty and a heavy meal will produce gas. Avoid these foods:  vegetables, fried / greasy foods, carbonated drinks for today  MEDICATION:         ACTIVITY:  You may not resume your normal daily activities until tomorrow AM; it is recommended that you spend the remainder of the day resting -  avoid any strenuous activity. CALL M.D.   ANY SIGN OF:   Increasing pain, nausea, vomiting  Abdominal distension (swelling)  New increased bleeding (oral or rectal)  Fever (chills)  Pain in chest area  Bloody discharge from nose or mouth  Shortness of breath    IMPRESSION:  -Appearance suggestive of mild esophagitis, with normal esophageal mucosa otherwise; biopsied to exclude inflammation  -Normal stomach mucosa; biopsied to exclude inflammation  -Normal duodenal and ampullary mucosa; biopsied to exclude inflammation  -Extensive diverticulosis  -Small grade 1 internal hemorrhoids  -No colon masses, polyps, or inflammation noted    Follow-up Instructions:   Call Dr. Marina Lugo for the results of procedure / biopsy in 7-10 days  Telephone # [phone]  No further colonoscopy indicated    Luis Angel Beebe MD

## 2020-06-11 NOTE — PROCEDURES
NAME:  Beth Cyr   :   1940   MRN:   [id number]     Date/Time:  2020 2:01 PM    Esophagogastroduodenoscopy (EGD) Procedure Note    Procedure: Esophagogastroduodenoscopy with biopsy    Indication:  Family hx Marin syndrome  Pre-operative Diagnosis: see indication above  Post-operative Diagnosis: see findings below  :  Neda Mendoza MD  Referring Provider:   Maye Piek MD    Exam:  Airway: clear, no airway problems anticipated  Heart: RRR, without gallops or rubs  Lungs: clear bilaterally without wheezes, crackles, or rhonchi  Abdomen: soft, nontender, nondistended, bowel sounds present  Mental Status: awake, alert and oriented to person, place and time     Anethesia/Sedation:  MAC anesthesia Propofol asper colonoscopy  Procedure Details   After informed consent was obtained for the procedure, with all risks and benefits of procedure explained the patient was taken to the endoscopy suite and placed in the left lateral decubitus position. Following sequential administration of sedation as per above, the [id number] gastroscope was inserted into the mouth and advanced under direct vision to second portion of the duodenum. A careful inspection was made as the gastroscope was withdrawn, including a retroflexed view of the proximal stomach; findings and interventions are described below. Findings:    -Appearance suggestive of mild esophagitis, with normal esophageal mucosa otherwise; biopsied to exclude inflammation  -Normal stomach mucosa; biopsied to exclude inflammation  -Normal duodenal and ampullary mucosa; biopsied to exclude inflammation     Therapies:  biopsy of esophagus, stomach, and duodenum  Specimens: #1 duod, #2 gastric, #3 g-e jxn  EBL:  None. Complications:   None; patient tolerated the procedure well. Impression:    -Appearance suggestive of mild esophagitis, with normal esophageal mucosa otherwise; biopsied to exclude inflammation  -Normal stomach mucosa; biopsied to exclude inflammation  -Normal duodenal and ampullary mucosa; biopsied to exclude inflammation     Recommendations:  -Proceed to colonoscopy,. -Await pathology.     Discharge disposition:  Home in the company of  when able to ambulate after colonoscopy    Marsha Pickens MD

## 2021-03-10 ENCOUNTER — TELEPHONE (OUTPATIENT)
Dept: INTERNAL MEDICINE CLINIC | Age: 81
End: 2021-03-10

## 2021-03-10 NOTE — TELEPHONE ENCOUNTER
----- Message from [employer] sent at 3/10/2021  4:24 PM EST -----  Regarding: /Refill  Contact: [phone]  Medication Refill    Caller (if not patient):pt      Relationship of caller (if not patient):n/a      Best contact number(s):[phone]        Name of medication and dosage if known:losartan (COZAAR) 50 mg tablet , atorvastatin (LIPITOR) 80 mg tablet, allopurinoL (ZYLOPRIM) 100 mg tablet, atenoloL (TENORMIN) 25 mg tablet       Is patient out of this medication (yes/no):not yet      Pharmacy name:[facility]    Pharmacy listed in chart? (yes/no):yes  Pharmacy phone number:n/a      Message from [facility]

## 2021-03-11 ENCOUNTER — TELEPHONE (OUTPATIENT)
Dept: INTERNAL MEDICINE CLINIC | Age: 81
End: 2021-03-11

## 2021-03-11 RX ORDER — LOSARTAN POTASSIUM 50 MG/1
TABLET ORAL
Qty: 90 TAB | Refills: 0 | Status: SHIPPED | OUTPATIENT
Start: 2021-03-11 | End: 2021-03-18 | Stop reason: SDUPTHER

## 2021-03-11 RX ORDER — ALLOPURINOL 100 MG/1
TABLET ORAL
Qty: 90 TAB | Refills: 0 | Status: SHIPPED | OUTPATIENT
Start: 2021-03-11 | End: 2021-03-18 | Stop reason: SDUPTHER

## 2021-03-11 RX ORDER — ATORVASTATIN CALCIUM 80 MG/1
TABLET, FILM COATED ORAL
Qty: 90 TAB | Refills: 0 | Status: SHIPPED | OUTPATIENT
Start: 2021-03-11 | End: 2021-03-18 | Stop reason: SDUPTHER

## 2021-03-11 RX ORDER — ATENOLOL 25 MG/1
TABLET ORAL
Qty: 90 TAB | Refills: 0 | Status: SHIPPED | OUTPATIENT
Start: 2021-03-11 | End: 2021-03-18 | Stop reason: SDUPTHER

## 2021-03-11 NOTE — TELEPHONE ENCOUNTER
Medication refills went to [facility] by mistake. He would like the Atenolol to stay at [facility] because he only has 5 pills remaining. Please reroute other meds to [facility].  All refills in the future need to go to [facility]

## 2021-03-11 NOTE — TELEPHONE ENCOUNTER
Future Appointments:  No future appointments.      Last Appointment With Me:  Visit date not found     Requested Prescriptions     Pending Prescriptions Disp Refills    losartan (COZAAR) 50 mg tablet 90 Tab 2    atorvastatin (LIPITOR) 80 mg tablet 90 Tab 2    allopurinoL (ZYLOPRIM) 100 mg tablet 90 Tab 2    atenoloL (TENORMIN) 25 mg tablet 90 Tab 2

## 2021-03-18 NOTE — TELEPHONE ENCOUNTER
Patient states all 4 refills done on 3/11/21 were done to the Wrong Pharmacy & should not have been done to [facility]. Prescriptions should. ve been done thru [facility] on File. Please call to update when done as patient is running out of Meds.  Thank you

## 2021-03-19 RX ORDER — LOSARTAN POTASSIUM 50 MG/1
TABLET ORAL
Qty: 90 TAB | Refills: 0 | Status: SHIPPED | OUTPATIENT
Start: 2021-03-19 | End: 2021-06-29

## 2021-03-19 RX ORDER — ATENOLOL 25 MG/1
TABLET ORAL
Qty: 90 TAB | Refills: 0 | Status: SHIPPED | OUTPATIENT
Start: 2021-03-19 | End: 2021-06-07

## 2021-03-19 RX ORDER — ALLOPURINOL 100 MG/1
TABLET ORAL
Qty: 90 TAB | Refills: 0 | Status: SHIPPED | OUTPATIENT
Start: 2021-03-19 | End: 2021-06-23 | Stop reason: SDUPTHER

## 2021-03-19 RX ORDER — ATORVASTATIN CALCIUM 80 MG/1
TABLET, FILM COATED ORAL
Qty: 90 TAB | Refills: 0 | Status: SHIPPED | OUTPATIENT
Start: 2021-03-19 | End: 2021-06-23 | Stop reason: SDUPTHER

## 2021-03-19 NOTE — TELEPHONE ENCOUNTER
Spoke with patient concerning his RX's. Patient was informed that Dr. Lora Juárez have already sent in his Rx's to his mail order pharmacy.

## 2021-06-02 ENCOUNTER — OFFICE VISIT (OUTPATIENT)
Dept: INTERNAL MEDICINE CLINIC | Age: 81
End: 2021-06-02
Payer: MEDICARE

## 2021-06-02 VITALS
TEMPERATURE: 96.7 F | RESPIRATION RATE: 16 BRPM | OXYGEN SATURATION: 96 % | DIASTOLIC BLOOD PRESSURE: 50 MMHG | WEIGHT: 198 LBS | HEART RATE: 55 BPM | BODY MASS INDEX: 28.35 KG/M2 | SYSTOLIC BLOOD PRESSURE: 125 MMHG | HEIGHT: 70 IN

## 2021-06-02 DIAGNOSIS — Z12.5 PROSTATE CANCER SCREENING: ICD-10-CM

## 2021-06-02 DIAGNOSIS — I10 ESSENTIAL HYPERTENSION: Primary | ICD-10-CM

## 2021-06-02 DIAGNOSIS — I25.10 CORONARY ARTERY DISEASE INVOLVING NATIVE CORONARY ARTERY OF NATIVE HEART WITHOUT ANGINA PECTORIS: ICD-10-CM

## 2021-06-02 DIAGNOSIS — R53.83 FATIGUE, UNSPECIFIED TYPE: ICD-10-CM

## 2021-06-02 DIAGNOSIS — R73.03 PREDIABETES: ICD-10-CM

## 2021-06-02 DIAGNOSIS — Z00.00 MEDICARE ANNUAL WELLNESS VISIT, SUBSEQUENT: ICD-10-CM

## 2021-06-02 DIAGNOSIS — E78.00 PURE HYPERCHOLESTEROLEMIA: ICD-10-CM

## 2021-06-02 DIAGNOSIS — Z87.39 HX OF GOUT: ICD-10-CM

## 2021-06-02 PROCEDURE — 99213 OFFICE O/P EST LOW 20 MIN: CPT | Performed by: INTERNAL MEDICINE

## 2021-06-02 PROCEDURE — G8419 CALC BMI OUT NRM PARAM NOF/U: HCPCS | Performed by: INTERNAL MEDICINE

## 2021-06-02 PROCEDURE — G8536 NO DOC ELDER MAL SCRN: HCPCS | Performed by: INTERNAL MEDICINE

## 2021-06-02 PROCEDURE — G0463 HOSPITAL OUTPT CLINIC VISIT: HCPCS | Performed by: INTERNAL MEDICINE

## 2021-06-02 PROCEDURE — G8754 DIAS BP LESS 90: HCPCS | Performed by: INTERNAL MEDICINE

## 2021-06-02 PROCEDURE — G0439 PPPS, SUBSEQ VISIT: HCPCS | Performed by: INTERNAL MEDICINE

## 2021-06-02 PROCEDURE — 1101F PT FALLS ASSESS-DOCD LE1/YR: CPT | Performed by: INTERNAL MEDICINE

## 2021-06-02 PROCEDURE — G8427 DOCREV CUR MEDS BY ELIG CLIN: HCPCS | Performed by: INTERNAL MEDICINE

## 2021-06-02 PROCEDURE — G8510 SCR DEP NEG, NO PLAN REQD: HCPCS | Performed by: INTERNAL MEDICINE

## 2021-06-02 PROCEDURE — G8752 SYS BP LESS 140: HCPCS | Performed by: INTERNAL MEDICINE

## 2021-06-02 NOTE — PROGRESS NOTES
HISTORY OF PRESENT ILLNESS  Noreen Harper is a 80 y.o. male.   HPI      F/u HTN HLD elevated glucose, peripheral neuropathy , CAD s/p cabg 2001 and stent 2008 and medicare wellness--------------  Had bout of sigmoid diverticulitis last year  Had subsequent EGD-gastritis and mqoqebakjld-bhyf-Oa Leandra Diener  Chest pain--infrequent  Saw Dr Malissa Weaver yesterday ---imbur  Ws increased to 60 mg every day for angina  Tired in mornings but feels better in evenings for last 1-2 years but takes Naps in the day  On neuronitn for peripheral neuropathy  Had mild BORIS [facility] ago [facility]     Some right shoulder pain worse at night per pt  Last OV    S/p Right THR 6/14/19 Dr Chantel Jeter well per pt  Now may need knee replacement  Last a1c 6.5 LDL 46  a1c is 6.3 today  a1c today  Had burning right upper chest pain last month-----------------------  Sees Dr Malissa Weaver for hx CD s/p cabg-had NST last month--ischemia in RCA distribution  Now on imdur which has helped  Due for flu shot and [id number]  No recent episodes of gout       Patient Active Problem List    Diagnosis Date Noted    Diverticulosis 06/29/2019    Status post right hip replacement 06/13/2019    HTN (hypertension) 05/28/2019    Pure hypercholesterolemia 05/28/2019    Hx of gout 05/28/2019    Hx of colonic polyps 05/28/2019    Coronary artery disease involving native coronary artery of native heart without angina pectoris 05/28/2019    Rosacea 05/28/2019    Idiopathic peripheral neuropathy 05/28/2019     Current Outpatient Medications   Medication Sig Dispense Refill    losartan (COZAAR) 50 mg tablet Take 1 tablet by mouth daily 90 Tab 0    atorvastatin (LIPITOR) 80 mg tablet Take 1 tablet by mouth daily 90 Tab 0    allopurinoL (ZYLOPRIM) 100 mg tablet Take 1 tablet by mouth daily 90 Tab 0    atenoloL (TENORMIN) 25 mg tablet Take 1 tablet by mouth daily 90 Tab 0    gabapentin (NEURONTIN) 300 mg capsule Take 2 capsules by mouth 3 times a day 540 Cap 1    ciprofloxacin HCl (CIPRO) 500 mg tablet Take 1 Tab by mouth two (2) times a day. 20 Tab 0    metroNIDAZOLE (FLAGYL) 500 mg tablet Take 1 Tab by mouth three (3) times daily. 30 Tab 0    metroNIDAZOLE (METROGEL) 1 % topical gel Apply  to affected area daily. Use a thin layer to affected areas after washing 45 g 0    isosorbide mononitrate ER (IMDUR) 30 mg tablet Take  by mouth daily.  multivitamin (ONE A DAY) tablet Take 1 Tab by mouth daily.  omega 3-dha-epa-fish oil (FISH OIL) 100-160-1,000 mg cap Take  by mouth two (2) times a day. 2 tabs bid      pyridoxine, vitamin B6, (VITAMIN B-6) 100 mg tablet Take 100 mg by mouth daily.  cholecalciferol (VITAMIN D3) 1,000 unit cap Take 1,000 Units by mouth daily.        No Known Allergies   Lab Results   Component Value Date/Time    WBC 11.1 (H) 04/20/2020 03:57 PM    HGB 14.3 04/20/2020 03:57 PM    HCT 43.2 04/20/2020 03:57 PM    PLATELET 490 57/82/4465 03:57 PM    MCV 87 04/20/2020 03:57 PM     Lab Results   Component Value Date/Time    Hemoglobin A1c 6.5 (H) 05/28/2019 11:44 AM    Glucose 126 (H) 06/14/2019 03:42 AM    Glucose (POC) 119 (H) 06/13/2019 10:36 AM    Microalb/Creat ratio (ug/mg creat.) 10.7 11/26/2019 12:54 PM    LDL, calculated 46 05/28/2019 11:44 AM    Creatinine (POC) 1.0 04/24/2020 01:47 PM    Creatinine 1.09 06/14/2019 03:42 AM      Lab Results   Component Value Date/Time    Cholesterol, total 109 05/28/2019 11:44 AM    HDL Cholesterol 42 05/28/2019 11:44 AM    LDL, calculated 46 05/28/2019 11:44 AM    Triglyceride 103 05/28/2019 11:44 AM     Lab Results   Component Value Date/Time    GFR est non-AA >60 06/14/2019 03:42 AM    GFRNA, POC >60 04/24/2020 01:47 PM    GFR est AA >60 06/14/2019 03:42 AM    GFRAA, POC >60 04/24/2020 01:47 PM    Creatinine 1.09 06/14/2019 03:42 AM    Creatinine (POC) 1.0 04/24/2020 01:47 PM    BUN 13 06/14/2019 03:42 AM    Sodium 138 06/14/2019 03:42 AM    Potassium 4.0 06/14/2019 03:42 AM    Chloride 104 06/14/2019 03:42 AM    CO2 28 06/14/2019 03:42 AM     No results found for: TSH, TSH2, TSH3, TSHP, TSHELE, TSHEXT, TT3, T3U, T3UP, FRT3, FT3, FT4, FT4P, T4, T4P, FT4T, TT7, TSHEXT      ROS    Physical Exam  Vitals and nursing note reviewed. Constitutional:       General: He is not in acute distress. Appearance: He is well-developed. Comments: Appears stated age   HENT:      Head: Normocephalic. Cardiovascular:      Rate and Rhythm: Normal rate and regular rhythm. Heart sounds: Normal heart sounds. Pulmonary:      Effort: Pulmonary effort is normal.      Breath sounds: Normal breath sounds. Abdominal:      Palpations: Abdomen is soft. Neurological:      Mental Status: He is alert. ASSESSMENT and PLAN  Diagnoses and all orders for this visit:    1. Essential hypertension  -     METABOLIC PANEL, COMPREHENSIVE; Future  -     CBC W/O DIFF; Future    2. Pure hypercholesterolemia  -     METABOLIC PANEL, COMPREHENSIVE; Future  -     LIPID PANEL; Future  -     TSH 3RD GENERATION; Future    3. Prediabetes  -     HEMOGLOBIN A1C WITH EAG; Future  -     METABOLIC PANEL, COMPREHENSIVE; Future    4. Coronary artery disease involving native coronary artery of native heart without angina pectoris  -     METABOLIC PANEL, COMPREHENSIVE; Future    5. Hx of gout    6. Fatigue, unspecified type  -     SLEEP MEDICINE REFERRAL    7. Prostate cancer screening  -     PSA SCREENING (SCREENING); Future           This is the Subsequent Medicare Annual Wellness Exam, performed 12 months or more after the Initial AWV or the last Subsequent AWV    I have reviewed the patient's medical history in detail and updated the computerized patient record. Assessment/Plan   Education and counseling provided:  Are appropriate based on today's review and evaluation  End-of-Life planning (with patient's consent)  Prostate cancer screening tests (PSA, covered annually)  shingrix    1.  Essential hypertension  -     METABOLIC PANEL, COMPREHENSIVE; Future  -     CBC W/O DIFF; Future   controlled  2. Pure hypercholesterolemia  -     METABOLIC PANEL, COMPREHENSIVE; Future  -     LIPID PANEL; Future  -     TSH 3RD GENERATION; Future  3. Prediabetes  -     HEMOGLOBIN A1C WITH EAG; Future  -     METABOLIC PANEL, COMPREHENSIVE; Future   Weight reduction recommended  4. Coronary artery disease involving native coronary artery of native heart without angina pectoris  -     METABOLIC PANEL, COMPREHENSIVE; Future   Stable, no cp or angina  5. Hx of gout   Quiet on allopurinol  6. Fatigue, unspecified type  -     SLEEP MEDICINE REFERRAL  7. Prostate cancer screening  -     PSA SCREENING (SCREENING); Future       Depression Risk Factor Screening     3 most recent PHQ Screens 6/2/2021   Little interest or pleasure in doing things Not at all   Feeling down, depressed, irritable, or hopeless Not at all   Total Score PHQ 2 0       Alcohol Risk Screen    Do you average more than 1 drink per night or more than 7 drinks a week: Yes    In the past three months have you have had more than 4 drinks containing alcohol on one occasion: No        Functional Ability and Level of Safety    Hearing: The patient wears hearing aids. Activities of Daily Living: The home contains: handrails and grab bars  Patient does total self care      Ambulation: with no difficulty     Fall Risk:  Fall Risk Assessment, last 12 mths 6/2/2021   Able to walk? Yes   Fall in past 12 months? 0   Do you feel unsteady?  0   Are you worried about falling 0      Abuse Screen:  Patient is not abused       Cognitive Screening    Has your family/caregiver stated any concerns about your memory: no     Cognitive Screening: Normal - serial 3    Health Maintenance Due     Health Maintenance Due   Topic Date Due    Shingrix Vaccine Age 49> (1 of 2) Never done    Lipid Screen  05/28/2020    Medicare Yearly Exam  11/26/2020       Patient Care Team   Patient Care Team:  Ashlyn Guevara MD as PCP - General (Internal Medicine)  Neil Thomas MD as PCP - [facility]  Arabella Rucker MD (Orthopedic Surgery)    History     Patient Active Problem List   Diagnosis Code    HTN (hypertension) I10    Pure hypercholesterolemia E78.00    Hx of gout Z87.39    Hx of colonic polyps Z86.010    Coronary artery disease involving native coronary artery of native heart without angina pectoris I25.10    Rosacea L71.9    Idiopathic peripheral neuropathy G60.9    Status post right hip replacement Z96.641    Diverticulosis K57.90     Past Medical History:   Diagnosis Date    Adverse effect of anesthesia     hallucinations/agitation after last surgery 2018 in hospital 3 days    Arthritis     knee and hip/right    CAD (coronary artery disease) 2001    Diabetes [address] [address]     Hgb A1C 6.1 5/2019 - No meds, dx Pre-DM being monitored by PCP    Hypercholesteremia     Hypertension     Nausea & vomiting     Sleep apnea     Borderline/ No CPAP      Past Surgical History:   Procedure Laterality Date    COLONOSCOPY N/A 6/11/2020    [facility]COPY    COLONOSCOPY,DIAGNOSTIC  6/11/2020         HX APPENDECTOMY      HX CORONARY ARTERY BYPASS GRAFT  2001    5v    HX GI      umbilical hernia x3    HX HIP REPLACEMENT Left 2017    HX KNEE ARTHROSCOPY Right 2007    right knee    CA CARDIAC SURG PROCEDURE UNLIST  03/2001    Bypass    CA CARDIAC SURG PROCEDURE UNLIST  2008    stents    CA TOTAL KNEE ARTHROPLASTY Left 2017    left hip replacement    UPPER GI ENDOSCOPY,BIOPSY  6/11/2020          Current Outpatient Medications   Medication Sig Dispense Refill    losartan (COZAAR) 50 mg tablet Take 1 tablet by mouth daily 90 Tab 0    atorvastatin (LIPITOR) 80 mg tablet Take 1 tablet by mouth daily 90 Tab 0    allopurinoL (ZYLOPRIM) 100 mg tablet Take 1 tablet by mouth daily 90 Tab 0    atenoloL (TENORMIN) 25 mg tablet Take 1 tablet by mouth daily 90 Tab 0    gabapentin (NEURONTIN) 300 mg capsule Take 2 capsules by mouth 3 times a day 540 Cap 1    metroNIDAZOLE (METROGEL) 1 % topical gel Apply  to affected area daily. Use a thin layer to affected areas after washing 45 g 0    isosorbide mononitrate ER (IMDUR) 30 mg tablet Take 60 mg by mouth daily.  multivitamin (ONE A DAY) tablet Take 1 Tab by mouth daily.  omega 3-dha-epa-fish oil (FISH OIL) 100-160-1,000 mg cap Take  by mouth two (2) times a day. 2 tabs bid      pyridoxine, vitamin B6, (VITAMIN B-6) 100 mg tablet Take 100 mg by mouth daily.  cholecalciferol (VITAMIN D3) 1,000 unit cap Take 1,000 Units by mouth daily.  metroNIDAZOLE (FLAGYL) 500 mg tablet Take 1 Tab by mouth three (3) times daily. 30 Tab 0     No Known Allergies    Family History   Problem Relation Age of Onset    Cancer Mother      Social History     Tobacco Use    Smoking status: Never Smoker    Smokeless tobacco: Never Used   Substance Use Topics    Alcohol use:  Yes     Alcohol/week: 3.0 standard drinks     Types: 3 Cans of beer per week     Comment: Daily 1-2 drinks         Keyshawn Beltran MD

## 2021-06-02 NOTE — PATIENT INSTRUCTIONS
Office Policies Phone calls/patient messages: Please allow up to 24 hours for someone in the office to contact you about your call or message. Be mindful your provider may be out of the office or your message may require further review. We encourage you to use [employer] for your messages as this is a faster, more efficient way to communicate with our office Medication Refills: 
         
Prescription medications require 48-72 business hours to process. We encourage you to use [employer] for your refills. For controlled medications: Please allow 72 business hours to process. Certain medications may require you to  a written prescription at our office. NO narcotic/controlled medications will be prescribed after 4pm Monday through Friday or on weekends Form/Paperwork Completion: 
         
Please note a $25 fee may incur for all paperwork for completed by our providers. We ask that you allow 7-10 business days. Pre-payment is due prior to picking up/faxing the completed form. You may also download your forms to [employer] to have your doctor print off. Medicare Wellness Visit, Male The best way to live healthy is to have a lifestyle where you eat a well-balanced diet, exercise regularly, limit alcohol use, and quit all forms of tobacco/nicotine, if applicable. Regular preventive services are another way to keep healthy. Preventive services (vaccines, screening tests, monitoring & exams) can help personalize your care plan, which helps you manage your own care. Screening tests can find health problems at the earliest stages, when they are easiest to treat. [facility] follows the current, evidence-based guidelines published by the [facility] [employer] (USPSTF) when recommending preventive services for our patients.  Because we follow these guidelines, sometimes recommendations change over time as research supports it. (For example, a prostate screening blood test is no longer routinely recommended for men with no symptoms). Of course, you and your doctor may decide to screen more often for some diseases, based on your risk and co-morbidities (chronic disease you are already diagnosed with). Preventive services for you include: - Medicare offers their members a free annual wellness visit, which is time for you and your primary care provider to discuss and plan for your preventive service needs. Take advantage of this benefit every year! 
-All adults over age 72 should receive the recommended pneumonia vaccines. Current USPSTF guidelines recommend a series of two vaccines for the best pneumonia protection.  
-All adults should have a flu vaccine yearly and tetanus vaccine every 10 years. 
-All adults age 48 and older should receive the shingles vaccines (series of two vaccines). -All adults age 38-68 who are overweight should have a diabetes screening test once every three years.  
-Other screening tests & preventive services for persons with diabetes include: an eye exam to screen for diabetic retinopathy, a kidney function test, a foot exam, and stricter control over your cholesterol.  
-Cardiovascular screening for adults with routine risk involves an electrocardiogram (ECG) at intervals determined by the provider.  
-Colorectal cancer screening should be done for adults age 54-65 with no increased risk factors for colorectal cancer. There are a number of acceptable methods of screening for this type of cancer. Each test has its own benefits and drawbacks. Discuss with your provider what is most appropriate for you during your annual wellness visit.  The different tests include: colonoscopy (considered the best screening method), a fecal occult blood test, a fecal DNA test, and sigmoidoscopy. 
-All adults born between [facility] should be screened once for Hepatitis C. 
-An Abdominal Aortic Aneurysm (AAA) Screening is recommended for men age 73-68 who has ever smoked in their lifetime. Here is a list of your current Health Maintenance items (your personalized list of preventive services) with a due date: 
Health Maintenance Due Topic Date Due  Shingles Vaccine (1 of 2) Never done  Cholesterol Test   05/28/2020

## 2021-06-03 LAB
ALBUMIN SERPL-MCNC: 3.6 G/DL (ref 3.5–5)
ALBUMIN/GLOB SERPL: 1 {RATIO} (ref 1.1–2.2)
ALP SERPL-CCNC: 100 U/L (ref 45–117)
ALT SERPL-CCNC: 25 U/L (ref 12–78)
ANION GAP SERPL CALC-SCNC: 4 MMOL/L (ref 5–15)
AST SERPL-CCNC: 21 U/L (ref 15–37)
BILIRUB SERPL-MCNC: 0.5 MG/DL (ref 0.2–1)
BUN SERPL-MCNC: 18 MG/DL (ref 6–20)
BUN/CREAT SERPL: 19 (ref 12–20)
CALCIUM SERPL-MCNC: 9.4 MG/DL (ref 8.5–10.1)
CHLORIDE SERPL-SCNC: 105 MMOL/L (ref 97–108)
CHOLEST SERPL-MCNC: 116 MG/DL
CO2 SERPL-SCNC: 30 MMOL/L (ref 21–32)
CREAT SERPL-MCNC: 0.93 MG/DL (ref 0.7–1.3)
ERYTHROCYTE [DISTWIDTH] IN BLOOD BY AUTOMATED COUNT: 13.9 % (ref 11.5–14.5)
EST. AVERAGE GLUCOSE BLD GHB EST-MCNC: 137 MG/DL
GLOBULIN SER CALC-MCNC: 3.7 G/DL (ref 2–4)
GLUCOSE SERPL-MCNC: 147 MG/DL (ref 65–100)
HBA1C MFR BLD: 6.4 % (ref 4–5.6)
HCT VFR BLD AUTO: 45.8 % (ref 36.6–50.3)
HDLC SERPL-MCNC: 49 MG/DL
HDLC SERPL: 2.4 {RATIO} (ref 0–5)
HGB BLD-MCNC: 14.7 G/DL (ref 12.1–17)
LDLC SERPL CALC-MCNC: 50.6 MG/DL (ref 0–100)
MCH RBC QN AUTO: 29.2 PG (ref 26–34)
MCHC RBC AUTO-ENTMCNC: 32.1 G/DL (ref 30–36.5)
MCV RBC AUTO: 91.1 FL (ref 80–99)
NRBC # BLD: 0 K/UL (ref 0–0.01)
NRBC BLD-RTO: 0 PER 100 WBC
PLATELET # BLD AUTO: 232 K/UL (ref 150–400)
PMV BLD AUTO: 10.5 FL (ref 8.9–12.9)
POTASSIUM SERPL-SCNC: 4.8 MMOL/L (ref 3.5–5.1)
PROT SERPL-MCNC: 7.3 G/DL (ref 6.4–8.2)
PSA SERPL-MCNC: 3.3 NG/ML (ref 0.01–4)
RBC # BLD AUTO: 5.03 M/UL (ref 4.1–5.7)
SODIUM SERPL-SCNC: 139 MMOL/L (ref 136–145)
TRIGL SERPL-MCNC: 82 MG/DL (ref ?–150)
TSH SERPL DL<=0.05 MIU/L-ACNC: 1.63 UIU/ML (ref 0.36–3.74)
VLDLC SERPL CALC-MCNC: 16.4 MG/DL
WBC # BLD AUTO: 9.2 K/UL (ref 4.1–11.1)

## 2021-06-07 RX ORDER — ATENOLOL 25 MG/1
TABLET ORAL
Qty: 90 TABLET | Refills: 0 | Status: SHIPPED | OUTPATIENT
Start: 2021-06-07 | End: 2021-06-08 | Stop reason: SDUPTHER

## 2021-06-08 RX ORDER — ATENOLOL 25 MG/1
TABLET ORAL
Qty: 90 TABLET | Refills: 3 | Status: SHIPPED | OUTPATIENT
Start: 2021-06-08 | End: 2021-06-23 | Stop reason: SDUPTHER

## 2021-06-23 ENCOUNTER — TELEPHONE (OUTPATIENT)
Dept: INTERNAL MEDICINE CLINIC | Age: 81
End: 2021-06-23

## 2021-06-23 DIAGNOSIS — I10 ESSENTIAL HYPERTENSION: ICD-10-CM

## 2021-06-23 DIAGNOSIS — R52 PAIN: ICD-10-CM

## 2021-06-23 DIAGNOSIS — E78.00 PURE HYPERCHOLESTEROLEMIA: Primary | ICD-10-CM

## 2021-06-23 DIAGNOSIS — Z87.39 HX OF GOUT: ICD-10-CM

## 2021-06-23 RX ORDER — ATORVASTATIN CALCIUM 80 MG/1
TABLET, FILM COATED ORAL
Qty: 90 TABLET | Refills: 3 | Status: SHIPPED | OUTPATIENT
Start: 2021-06-23 | End: 2022-02-11 | Stop reason: SDUPTHER

## 2021-06-23 RX ORDER — ALLOPURINOL 100 MG/1
TABLET ORAL
Qty: 90 TABLET | Refills: 3 | Status: SHIPPED | OUTPATIENT
Start: 2021-06-23 | End: 2022-02-11 | Stop reason: SDUPTHER

## 2021-06-23 RX ORDER — GABAPENTIN 300 MG/1
CAPSULE ORAL
Qty: 540 CAPSULE | Refills: 1 | Status: SHIPPED | OUTPATIENT
Start: 2021-06-23 | End: 2021-12-14 | Stop reason: ALTCHOICE

## 2021-06-23 RX ORDER — ATENOLOL 25 MG/1
TABLET ORAL
Qty: 90 TABLET | Refills: 3 | Status: SHIPPED | OUTPATIENT
Start: 2021-06-23 | End: 2022-02-11 | Stop reason: SDUPTHER

## 2021-06-23 NOTE — TELEPHONE ENCOUNTER
He didn't specify sending anything to local pharmacy. Patient said that Avelino Mckeon has been faxing a refill request to office and has not received request back so he decided to call office to figure out what was going on.  He didn't say anything to me about being out of medication but I did call back just to make sure and to see if he needed any sent to his local pharmacy until he receives mail order

## 2021-06-23 NOTE — TELEPHONE ENCOUNTER
PCP: Surjit Back MD    Last appt: 6/2/2021    Future Appointments   Date Time Provider Ramya Kang   8/10/2021  2:20 PM Kala Tilley MD [facility] BS AMB   12/7/2021 11:15 AM Surjit Back MD [facility] BS AMB       Requested Prescriptions     Pending Prescriptions Disp Refills    allopurinoL (ZYLOPRIM) 100 mg tablet 90 Tablet 0     Sig: Take 1 tablet by mouth daily    atenoloL (TENORMIN) 25 mg tablet 90 Tablet 3     Sig: TAKE 1 TABLET BY MOUTH EVERY DAY    atorvastatin (LIPITOR) 80 mg tablet 90 Tablet 0     Sig: Take 1 tablet by mouth daily    gabapentin (NEURONTIN) 300 mg capsule 540 Capsule 1     Sig: Take 2 capsules by mouth 3 times a day       Prior labs and Blood pressures:  BP Readings from Last 3 Encounters:   06/02/21 (!) 125/50   06/11/20 139/87   04/20/20 122/66     Lab Results   Component Value Date/Time    Sodium 139 06/03/2021 08:35 AM    Potassium 4.8 06/03/2021 08:35 AM    Chloride 105 06/03/2021 08:35 AM    CO2 30 06/03/2021 08:35 AM    Anion gap 4 (L) 06/03/2021 08:35 AM    Glucose 147 (H) 06/03/2021 08:35 AM    BUN 18 06/03/2021 08:35 AM    Creatinine 0.93 06/03/2021 08:35 AM    BUN/Creatinine ratio 19 06/03/2021 08:35 AM    GFR est AA >60 06/03/2021 08:35 AM    GFR est non-AA >60 06/03/2021 08:35 AM    Calcium 9.4 06/03/2021 08:35 AM     Lab Results   Component Value Date/Time    Hemoglobin A1c 6.4 (H) 06/03/2021 08:35 AM    Hemoglobin A1c (POC) 6.3 (A) 11/26/2019 11:57 AM     Lab Results   Component Value Date/Time    Cholesterol, total 116 06/03/2021 08:35 AM    HDL Cholesterol 49 06/03/2021 08:35 AM    LDL, calculated 50.6 06/03/2021 08:35 AM    VLDL, calculated 16.4 06/03/2021 08:35 AM    Triglyceride 82 06/03/2021 08:35 AM    CHOL/HDL Ratio 2.4 06/03/2021 08:35 AM     No results found for: ANDRES Almendarez    Lab Results   Component Value Date/Time    TSH 1.63 06/03/2021 08:35 AM

## 2021-06-23 NOTE — TELEPHONE ENCOUNTER
----- Message from Meagan Jack sent at 6/23/2021 12:27 PM EDT -----  Regarding: /Telephone  General Message/Vendor Calls    Caller's first and last name: Pt       Reason for call: Requesting a call from the nurse      Callback required yes/no and why: yes       Best contact number(s): [phone]       Details to clarify the request: its best to call him within the next two hours.  He is without his medications       Message from [facility]

## 2021-08-10 NOTE — PROGRESS NOTES
HISTORY OF PRESENT ILLNESS  Kate Ceron is a 80 y.o. male.   HPI   C/o back pain x 2 weeks, getting worse  This week at exercsie class yoga-falred up   Pain in right lower back -no radiation to legs  Pain can be dull but sharp when he moves the back  otc medicines not helping and lidocaine crm  Pain is worse when supine      C/o increased neuropathy traveling up legs to below the kneess-awakes him at night  C/o increased amber edema  Denies leg weakness    Last OV  F/u HTN HLD elevated glucose, peripheral neuropathy , CAD s/p cabg 2001 and stent 2008 and medicare wellness--------------  Had bout of sigmoid diverticulitis last year  Had subsequent EGD-gastritis and fnltrwqattf-njio-Vj Milon Jenny  Chest pain--infrequent  Saw Dr Sally Guevara yesterday ---imbur  Ws increased to 60 mg every day for angina  Tired in mornings but feels better in evenings for last 1-2 years but takes Naps in the day  On neuronitn for peripheral neuropathy  Had mild BORIS years ago at [facility]      Some right shoulder pain worse at night per pt    Patient Active Problem List    Diagnosis Date Noted    Diverticulosis 06/29/2019    Status post right hip replacement 06/13/2019    HTN (hypertension) 05/28/2019    Pure hypercholesterolemia 05/28/2019    Hx of gout 05/28/2019    Hx of colonic polyps 05/28/2019    Coronary artery disease involving native coronary artery of native heart without angina pectoris 05/28/2019    Rosacea 05/28/2019    Idiopathic peripheral neuropathy 05/28/2019     Current Outpatient Medications   Medication Sig Dispense Refill    losartan (COZAAR) 50 mg tablet Take 1 tablet by mouth daily 90 Tablet 3    allopurinoL (ZYLOPRIM) 100 mg tablet Take 1 tablet by mouth daily 90 Tablet 3    atenoloL (TENORMIN) 25 mg tablet TAKE 1 TABLET BY MOUTH EVERY DAY 90 Tablet 3    atorvastatin (LIPITOR) 80 mg tablet Take 1 tablet by mouth daily 90 Tablet 3    gabapentin (NEURONTIN) 300 mg capsule Take 2 capsules by mouth 3 times a day 540 Capsule 1    metroNIDAZOLE (METROGEL) 1 % topical gel Apply  to affected area daily. Use a thin layer to affected areas after washing 45 g 0    isosorbide mononitrate ER (IMDUR) 30 mg tablet Take 60 mg by mouth daily.  multivitamin (ONE A DAY) tablet Take 1 Tab by mouth daily.  omega 3-dha-epa-fish oil (FISH OIL) 100-160-1,000 mg cap Take  by mouth two (2) times a day. 2 tabs bid      pyridoxine, vitamin B6, (VITAMIN B-6) 100 mg tablet Take 100 mg by mouth daily.  cholecalciferol (VITAMIN D3) 1,000 unit cap Take 1,000 Units by mouth daily. No Known Allergies   Lab Results   Component Value Date/Time    WBC 9.2 06/03/2021 08:35 AM    HGB 14.7 06/03/2021 08:35 AM    HCT 45.8 06/03/2021 08:35 AM    PLATELET 415 71/69/5714 08:35 AM    MCV 91.1 06/03/2021 08:35 AM     Lab Results   Component Value Date/Time    GFR est non-AA >60 06/03/2021 08:35 AM    GFRNA, POC >60 04/24/2020 01:47 PM    GFR est AA >60 06/03/2021 08:35 AM    GFRAA, POC >60 04/24/2020 01:47 PM    Creatinine 0.93 06/03/2021 08:35 AM    Creatinine (POC) 1.0 04/24/2020 01:47 PM    BUN 18 06/03/2021 08:35 AM    Sodium 139 06/03/2021 08:35 AM    Potassium 4.8 06/03/2021 08:35 AM    Chloride 105 06/03/2021 08:35 AM    CO2 30 06/03/2021 08:35 AM        ROS    Physical Exam  Vitals and nursing note reviewed. Constitutional:       General: He is not in acute distress. Appearance: He is well-developed. Comments: Appears stated age   HENT:      Head: Normocephalic. Cardiovascular:      Rate and Rhythm: Normal rate and regular rhythm. Heart sounds: Normal heart sounds. Pulmonary:      Effort: Pulmonary effort is normal.      Breath sounds: Normal breath sounds. Abdominal:      Palpations: Abdomen is soft. Musculoskeletal:      Comments: Normal forward flexion of lower back  Pain with back extension  No TTP  Neg SLR b/l   Neurological:      Mental Status: He is alert.          ASSESSMENT and PLAN  Diagnoses and all orders for this visit:    1. Acute right-sided low back pain without sciatica   C/w strain   Heat pk   Flexeril and voltaren prn   To call if not improved in 1-2 weeks  2. Peripheral polyneuropathy  -     REFERRAL TO NEUROLOGY    Other orders  -     cyclobenzaprine (FLEXERIL) 5 mg tablet; Take 1 Tablet by mouth three (3) times daily as needed for Muscle Spasm(s). -     diclofenac EC (VOLTAREN) 50 mg EC tablet; Take 1 Tablet by mouth two (2) times a day.

## 2021-08-11 ENCOUNTER — OFFICE VISIT (OUTPATIENT)
Dept: INTERNAL MEDICINE CLINIC | Age: 81
End: 2021-08-11
Payer: MEDICARE

## 2021-08-11 VITALS
OXYGEN SATURATION: 97 % | RESPIRATION RATE: 16 BRPM | TEMPERATURE: 97.4 F | WEIGHT: 203 LBS | HEART RATE: 62 BPM | DIASTOLIC BLOOD PRESSURE: 80 MMHG | HEIGHT: 70 IN | SYSTOLIC BLOOD PRESSURE: 130 MMHG | BODY MASS INDEX: 29.06 KG/M2

## 2021-08-11 DIAGNOSIS — M54.50 ACUTE RIGHT-SIDED LOW BACK PAIN WITHOUT SCIATICA: Primary | ICD-10-CM

## 2021-08-11 DIAGNOSIS — G62.9 PERIPHERAL POLYNEUROPATHY: ICD-10-CM

## 2021-08-11 PROCEDURE — G0463 HOSPITAL OUTPT CLINIC VISIT: HCPCS | Performed by: INTERNAL MEDICINE

## 2021-08-11 PROCEDURE — G8754 DIAS BP LESS 90: HCPCS | Performed by: INTERNAL MEDICINE

## 2021-08-11 PROCEDURE — 99213 OFFICE O/P EST LOW 20 MIN: CPT | Performed by: INTERNAL MEDICINE

## 2021-08-11 PROCEDURE — G8419 CALC BMI OUT NRM PARAM NOF/U: HCPCS | Performed by: INTERNAL MEDICINE

## 2021-08-11 PROCEDURE — 1101F PT FALLS ASSESS-DOCD LE1/YR: CPT | Performed by: INTERNAL MEDICINE

## 2021-08-11 PROCEDURE — G8432 DEP SCR NOT DOC, RNG: HCPCS | Performed by: INTERNAL MEDICINE

## 2021-08-11 PROCEDURE — G8536 NO DOC ELDER MAL SCRN: HCPCS | Performed by: INTERNAL MEDICINE

## 2021-08-11 PROCEDURE — G8427 DOCREV CUR MEDS BY ELIG CLIN: HCPCS | Performed by: INTERNAL MEDICINE

## 2021-08-11 PROCEDURE — G8752 SYS BP LESS 140: HCPCS | Performed by: INTERNAL MEDICINE

## 2021-08-11 RX ORDER — CYCLOBENZAPRINE HCL 5 MG
5 TABLET ORAL
Qty: 30 TABLET | Refills: 0 | Status: SHIPPED | OUTPATIENT
Start: 2021-08-11 | End: 2021-09-16 | Stop reason: ALTCHOICE

## 2021-08-11 RX ORDER — DICLOFENAC SODIUM 50 MG/1
50 TABLET, DELAYED RELEASE ORAL 2 TIMES DAILY
Qty: 30 TABLET | Refills: 0 | Status: SHIPPED | OUTPATIENT
Start: 2021-08-11 | End: 2021-09-16 | Stop reason: ALTCHOICE

## 2021-08-11 NOTE — PATIENT INSTRUCTIONS
Office Policies    Phone calls/patient messages:            Please allow up to 24 hours for someone in the office to contact you about your call or message. Be mindful your provider may be out of the office or your message may require further review. We encourage you to use [employer] for your messages as this is a faster, more efficient way to communicate with our office                         Medication Refills:            Prescription medications require 48-72 business hours to process. We encourage you to use [employer] for your refills. For controlled medications: Please allow 72 business hours to process. Certain medications may require you to  a written prescription at our office. NO narcotic/controlled medications will be prescribed after 4pm Monday through Friday or on weekends              Form/Paperwork Completion:            Please note a $25 fee may incur for all paperwork for completed by our providers. We ask that you allow 7-10 business days. Pre-payment is due prior to picking up/faxing the completed form. You may also download your forms to [employer] to have your doctor print off.

## 2021-08-25 ENCOUNTER — PATIENT MESSAGE (OUTPATIENT)
Dept: INTERNAL MEDICINE CLINIC | Age: 81
End: 2021-08-25

## 2021-08-25 NOTE — TELEPHONE ENCOUNTER
From: Kate Ceron  To: Pasha Maldonado MD  Sent: 8/25/2021 2:30 PM EDT  Subject: Luis Manuel Fischer,    Have received both Shingles vaccinations. June 4, August 25, 2021. Both at [facility].     Kate Ceron

## 2021-09-16 ENCOUNTER — TELEPHONE (OUTPATIENT)
Dept: INTERNAL MEDICINE CLINIC | Age: 81
End: 2021-09-16

## 2021-09-16 ENCOUNTER — OFFICE VISIT (OUTPATIENT)
Dept: INTERNAL MEDICINE CLINIC | Age: 81
End: 2021-09-16
Payer: MEDICARE

## 2021-09-16 ENCOUNTER — HOSPITAL ENCOUNTER (OUTPATIENT)
Dept: CT IMAGING | Age: 81
Discharge: HOME | End: 2021-09-16
Attending: INTERNAL MEDICINE
Payer: MEDICARE

## 2021-09-16 VITALS
TEMPERATURE: 98.1 F | DIASTOLIC BLOOD PRESSURE: 60 MMHG | OXYGEN SATURATION: 97 % | HEIGHT: 70 IN | HEART RATE: 59 BPM | BODY MASS INDEX: 28.49 KG/M2 | RESPIRATION RATE: 16 BRPM | SYSTOLIC BLOOD PRESSURE: 98 MMHG | WEIGHT: 199 LBS

## 2021-09-16 DIAGNOSIS — G44.301 INTRACTABLE POST-TRAUMATIC HEADACHE, UNSPECIFIED CHRONICITY PATTERN: ICD-10-CM

## 2021-09-16 DIAGNOSIS — G44.301 INTRACTABLE POST-TRAUMATIC HEADACHE, UNSPECIFIED CHRONICITY PATTERN: Primary | ICD-10-CM

## 2021-09-16 DIAGNOSIS — S00.93XA CONTUSION OF HEAD, UNSPECIFIED PART OF HEAD, INITIAL ENCOUNTER: Primary | ICD-10-CM

## 2021-09-16 PROCEDURE — 99213 OFFICE O/P EST LOW 20 MIN: CPT | Performed by: INTERNAL MEDICINE

## 2021-09-16 PROCEDURE — G8419 CALC BMI OUT NRM PARAM NOF/U: HCPCS | Performed by: INTERNAL MEDICINE

## 2021-09-16 PROCEDURE — G8536 NO DOC ELDER MAL SCRN: HCPCS | Performed by: INTERNAL MEDICINE

## 2021-09-16 PROCEDURE — G8752 SYS BP LESS 140: HCPCS | Performed by: INTERNAL MEDICINE

## 2021-09-16 PROCEDURE — G8432 DEP SCR NOT DOC, RNG: HCPCS | Performed by: INTERNAL MEDICINE

## 2021-09-16 PROCEDURE — G0463 HOSPITAL OUTPT CLINIC VISIT: HCPCS | Performed by: INTERNAL MEDICINE

## 2021-09-16 PROCEDURE — G8754 DIAS BP LESS 90: HCPCS | Performed by: INTERNAL MEDICINE

## 2021-09-16 PROCEDURE — G8427 DOCREV CUR MEDS BY ELIG CLIN: HCPCS | Performed by: INTERNAL MEDICINE

## 2021-09-16 PROCEDURE — 1101F PT FALLS ASSESS-DOCD LE1/YR: CPT | Performed by: INTERNAL MEDICINE

## 2021-09-16 PROCEDURE — 70450 CT HEAD/BRAIN W/O DYE: CPT

## 2021-09-16 NOTE — TELEPHONE ENCOUNTER
----- Message from Lizette Coppola sent at 9/16/2021 11:34 AM EDT -----  Regarding: Frantz Luis: [phone]  Patient return call    Caller's first and last name and relationship (if not the patient):SELF      Best contact number(s):[phone]      Whose call is being returned:NURSE      Message from [facility]

## 2021-09-16 NOTE — TELEPHONE ENCOUNTER
Called patient. ID verified with Name and . Spoke with patient and wife, patient scheduled for stat CT today and patient confirmed appt at 3:30 at this time. No further actions required at this time.

## 2021-09-16 NOTE — PROGRESS NOTES
HISTORY OF PRESENT ILLNESS  Delfina Landaverde is a 80 y.o. male. HPI     C/o constant low grade left sided headache for 10 days  Occurred after hittinbg his head up against the SUV door/trunk  No LOC or any neurologic symptoms  Head CT was doen stat today--no acute process  HA has started to lessen since yesterday and overall feels ok no    Patient Active Problem List    Diagnosis Date Noted    Diverticulosis 06/29/2019    Status post right hip replacement 06/13/2019    HTN (hypertension) 05/28/2019    Pure hypercholesterolemia 05/28/2019    Hx of gout 05/28/2019    Hx of colonic polyps 05/28/2019    Coronary artery disease involving native coronary artery of native heart without angina pectoris 05/28/2019    Rosacea 05/28/2019    Idiopathic peripheral neuropathy 05/28/2019     Current Outpatient Medications   Medication Sig Dispense Refill    losartan (COZAAR) 50 mg tablet Take 1 tablet by mouth daily 90 Tablet 3    allopurinoL (ZYLOPRIM) 100 mg tablet Take 1 tablet by mouth daily 90 Tablet 3    atenoloL (TENORMIN) 25 mg tablet TAKE 1 TABLET BY MOUTH EVERY DAY 90 Tablet 3    atorvastatin (LIPITOR) 80 mg tablet Take 1 tablet by mouth daily 90 Tablet 3    gabapentin (NEURONTIN) 300 mg capsule Take 2 capsules by mouth 3 times a day 540 Capsule 1    metroNIDAZOLE (METROGEL) 1 % topical gel Apply  to affected area daily. Use a thin layer to affected areas after washing 45 g 0    isosorbide mononitrate ER (IMDUR) 30 mg tablet Take 60 mg by mouth daily.  multivitamin (ONE A DAY) tablet Take 1 Tab by mouth daily.  omega 3-dha-epa-fish oil (FISH OIL) 100-160-1,000 mg cap Take  by mouth two (2) times a day. 2 tabs bid      pyridoxine, vitamin B6, (VITAMIN B-6) 100 mg tablet Take 100 mg by mouth daily.  cholecalciferol (VITAMIN D3) 1,000 unit cap Take 1,000 Units by mouth daily.  cyclobenzaprine (FLEXERIL) 5 mg tablet Take 1 Tablet by mouth three (3) times daily as needed for Muscle Spasm(s). (Patient not taking: Reported on 9/16/2021) 30 Tablet 0    diclofenac EC (VOLTAREN) 50 mg EC tablet Take 1 Tablet by mouth two (2) times a day. (Patient not taking: Reported on 9/16/2021) 30 Tablet 0     No Known Allergies   Lab Results   Component Value Date/Time    WBC 9.2 06/03/2021 08:35 AM    HGB 14.7 06/03/2021 08:35 AM    HCT 45.8 06/03/2021 08:35 AM    PLATELET 583 02/62/8347 08:35 AM    MCV 91.1 06/03/2021 08:35 AM     Lab Results   Component Value Date/Time    GFR est non-AA >60 06/03/2021 08:35 AM    GFRNA, POC >60 04/24/2020 01:47 PM    GFR est AA >60 06/03/2021 08:35 AM    GFRAA, POC >60 04/24/2020 01:47 PM    Creatinine 0.93 06/03/2021 08:35 AM    Creatinine (POC) 1.0 04/24/2020 01:47 PM    BUN 18 06/03/2021 08:35 AM    Sodium 139 06/03/2021 08:35 AM    Potassium 4.8 06/03/2021 08:35 AM    Chloride 105 06/03/2021 08:35 AM    CO2 30 06/03/2021 08:35 AM        ROS    Physical Exam  Vitals and nursing note reviewed. Constitutional:       General: He is not in acute distress. Appearance: He is well-developed. Comments: Appears stated age   HENT:      Head: Normocephalic. Comments: No TTP left scalp area  Cardiovascular:      Rate and Rhythm: Normal rate and regular rhythm. Heart sounds: Normal heart sounds. Pulmonary:      Effort: Pulmonary effort is normal.      Breath sounds: Normal breath sounds. Abdominal:      Palpations: Abdomen is soft. Neurological:      Mental Status: He is alert. ASSESSMENT and PLAN  Diagnoses and all orders for this visit:    1.  Contusion of head, unspecified part of head, initial encounter   Ice nargis and otc pain medication as needed   reassured

## 2021-10-22 ENCOUNTER — TRANSCRIBE ORDER (OUTPATIENT)
Dept: SCHEDULING | Age: 81
End: 2021-10-22

## 2021-10-22 DIAGNOSIS — M48.061 SPINAL STENOSIS OF LUMBAR REGION, UNSPECIFIED WHETHER NEUROGENIC CLAUDICATION PRESENT: Primary | ICD-10-CM

## 2021-10-29 ENCOUNTER — HOSPITAL ENCOUNTER (OUTPATIENT)
Dept: MRI IMAGING | Age: 81
Discharge: HOME | End: 2021-10-29
Attending: ORTHOPAEDIC SURGERY
Payer: MEDICARE

## 2021-10-29 DIAGNOSIS — M48.061 SPINAL STENOSIS OF LUMBAR REGION, UNSPECIFIED WHETHER NEUROGENIC CLAUDICATION PRESENT: ICD-10-CM

## 2021-10-29 PROCEDURE — 72148 MRI LUMBAR SPINE W/O DYE: CPT

## 2021-12-07 ENCOUNTER — OFFICE VISIT (OUTPATIENT)
Dept: INTERNAL MEDICINE CLINIC | Age: 81
End: 2021-12-07
Payer: MEDICARE

## 2021-12-07 VITALS
RESPIRATION RATE: 16 BRPM | DIASTOLIC BLOOD PRESSURE: 60 MMHG | OXYGEN SATURATION: 95 % | TEMPERATURE: 97.5 F | WEIGHT: 200.2 LBS | BODY MASS INDEX: 28.66 KG/M2 | HEART RATE: 74 BPM | HEIGHT: 70 IN | SYSTOLIC BLOOD PRESSURE: 104 MMHG

## 2021-12-07 DIAGNOSIS — G62.9 PERIPHERAL POLYNEUROPATHY: ICD-10-CM

## 2021-12-07 DIAGNOSIS — R73.03 PREDIABETES: ICD-10-CM

## 2021-12-07 DIAGNOSIS — I25.10 CORONARY ARTERY DISEASE INVOLVING NATIVE CORONARY ARTERY OF NATIVE HEART WITHOUT ANGINA PECTORIS: Primary | ICD-10-CM

## 2021-12-07 DIAGNOSIS — E78.00 PURE HYPERCHOLESTEROLEMIA: ICD-10-CM

## 2021-12-07 DIAGNOSIS — I10 HYPERTENSION, UNSPECIFIED TYPE: ICD-10-CM

## 2021-12-07 LAB
ALBUMIN SERPL-MCNC: 3.5 G/DL (ref 3.5–5)
ALBUMIN/GLOB SERPL: 1 {RATIO} (ref 1.1–2.2)
ALP SERPL-CCNC: 80 U/L (ref 45–117)
ALT SERPL-CCNC: 28 U/L (ref 12–78)
ANION GAP SERPL CALC-SCNC: 7 MMOL/L (ref 5–15)
AST SERPL-CCNC: 22 U/L (ref 15–37)
BILIRUB SERPL-MCNC: 0.7 MG/DL (ref 0.2–1)
BUN SERPL-MCNC: 17 MG/DL (ref 6–20)
BUN/CREAT SERPL: 18 (ref 12–20)
CALCIUM SERPL-MCNC: 9.2 MG/DL (ref 8.5–10.1)
CHLORIDE SERPL-SCNC: 106 MMOL/L (ref 97–108)
CO2 SERPL-SCNC: 29 MMOL/L (ref 21–32)
CREAT SERPL-MCNC: 0.94 MG/DL (ref 0.7–1.3)
EST. AVERAGE GLUCOSE BLD GHB EST-MCNC: 143 MG/DL
GLOBULIN SER CALC-MCNC: 3.6 G/DL (ref 2–4)
GLUCOSE SERPL-MCNC: 123 MG/DL (ref 65–100)
HBA1C MFR BLD: 6.6 % (ref 4–5.6)
POTASSIUM SERPL-SCNC: 4.8 MMOL/L (ref 3.5–5.1)
PROT SERPL-MCNC: 7.1 G/DL (ref 6.4–8.2)
SODIUM SERPL-SCNC: 142 MMOL/L (ref 136–145)

## 2021-12-07 PROCEDURE — G8419 CALC BMI OUT NRM PARAM NOF/U: HCPCS | Performed by: INTERNAL MEDICINE

## 2021-12-07 PROCEDURE — G8510 SCR DEP NEG, NO PLAN REQD: HCPCS | Performed by: INTERNAL MEDICINE

## 2021-12-07 PROCEDURE — 99213 OFFICE O/P EST LOW 20 MIN: CPT | Performed by: INTERNAL MEDICINE

## 2021-12-07 PROCEDURE — G8536 NO DOC ELDER MAL SCRN: HCPCS | Performed by: INTERNAL MEDICINE

## 2021-12-07 PROCEDURE — G0463 HOSPITAL OUTPT CLINIC VISIT: HCPCS | Performed by: INTERNAL MEDICINE

## 2021-12-07 PROCEDURE — G8754 DIAS BP LESS 90: HCPCS | Performed by: INTERNAL MEDICINE

## 2021-12-07 PROCEDURE — G8427 DOCREV CUR MEDS BY ELIG CLIN: HCPCS | Performed by: INTERNAL MEDICINE

## 2021-12-07 PROCEDURE — 1101F PT FALLS ASSESS-DOCD LE1/YR: CPT | Performed by: INTERNAL MEDICINE

## 2021-12-07 PROCEDURE — G8752 SYS BP LESS 140: HCPCS | Performed by: INTERNAL MEDICINE

## 2021-12-07 RX ORDER — ISOSORBIDE MONONITRATE 60 MG/1
1 TABLET, EXTENDED RELEASE ORAL DAILY
COMMUNITY
Start: 2021-06-01 | End: 2022-02-11 | Stop reason: SDUPTHER

## 2021-12-07 RX ORDER — CELECOXIB 100 MG/1
CAPSULE ORAL
COMMUNITY
Start: 2021-11-08 | End: 2022-05-19

## 2021-12-07 RX ORDER — MULTIVIT WITH MINERALS/HERBS
1 TABLET ORAL DAILY
COMMUNITY

## 2021-12-07 NOTE — PROGRESS NOTES
HISTORY OF PRESENT ILLNESS  Darleen Solis is a 80 y.o. male. HPI   F/u HTN HLD prediabetes, peripheral neuropathy , CAD s/p cabg 2001 and stent 2008. CARD Dr Ludivina Zhou cp or sob  Taking 1/2 tab of atenolol now due to fatigue on the 25 mg dose  Peripheral neuropathy-appt with Dr Farheen Claudio new pt visit soon. Still has moderate pain in feet and lower legs  Has hx mild boris- referral to sleep med was placed-pt reports he feels sleep has been ok without cpap  Last a1c 6.4 LDL 50  Not on a strict diet-snacks too much and too many cards  Had recent lumbar MESFIN for spinal stenosis and right leg pain--improved--has fu Dr Velma Dunaway soon    No recent gout  Last OV       Had bout of sigmoid diverticulitis last year  Had subsequent EGD-gastritis and nnenpugutct-eaow-Od Ethelle Handler  Chest pain--infrequent  Saw Dr Sheree Malik yesterday ---imbur  Ws increased to 60 mg every day for angina  Tired in mornings but feels better in evenings for last 1-2 years but takes Naps in the day  On neuronitn for peripheral neuropathy  Had mild BORIS years ago at [facility]      Some right shoulder pain worse at night per pt    Patient Active Problem List    Diagnosis Date Noted    Diverticulosis 06/29/2019    Status post right hip replacement 06/13/2019    HTN (hypertension) 05/28/2019    Pure hypercholesterolemia 05/28/2019    Hx of gout 05/28/2019    Hx of colonic polyps 05/28/2019    Coronary artery disease involving native coronary artery of native heart without angina pectoris 05/28/2019    Rosacea 05/28/2019    Idiopathic peripheral neuropathy 05/28/2019     Current Outpatient Medications   Medication Sig Dispense Refill    celecoxib (CELEBREX) 100 mg capsule       isosorbide mononitrate ER (IMDUR) 60 mg CR tablet Take 1 Tablet by mouth daily.  b complex vitamins tablet Take 1 Tablet by mouth daily.       losartan (COZAAR) 50 mg tablet Take 1 tablet by mouth daily 90 Tablet 3    allopurinoL (ZYLOPRIM) 100 mg tablet Take 1 tablet by mouth daily 90 Tablet 3    atenoloL (TENORMIN) 25 mg tablet TAKE 1 TABLET BY MOUTH EVERY DAY 90 Tablet 3    atorvastatin (LIPITOR) 80 mg tablet Take 1 tablet by mouth daily 90 Tablet 3    gabapentin (NEURONTIN) 300 mg capsule Take 2 capsules by mouth 3 times a day 540 Capsule 1    metroNIDAZOLE (METROGEL) 1 % topical gel Apply  to affected area daily. Use a thin layer to affected areas after washing 45 g 0    multivitamin (ONE A DAY) tablet Take 1 Tab by mouth daily.  omega 3-dha-epa-fish oil (FISH OIL) 100-160-1,000 mg cap Take  by mouth two (2) times a day. 2 tabs bid      cholecalciferol (VITAMIN D3) 1,000 unit cap Take 1,000 Units by mouth two (2) times a day.  isosorbide mononitrate ER (IMDUR) 30 mg tablet Take 60 mg by mouth daily.  pyridoxine, vitamin B6, (VITAMIN B-6) 100 mg tablet Take 100 mg by mouth daily.        No Known Allergies   Lab Results   Component Value Date/Time    WBC 9.2 06/03/2021 08:35 AM    HGB 14.7 06/03/2021 08:35 AM    HCT 45.8 06/03/2021 08:35 AM    PLATELET 343 65/96/8383 08:35 AM    MCV 91.1 06/03/2021 08:35 AM     Lab Results   Component Value Date/Time    Hemoglobin A1c 6.4 (H) 06/03/2021 08:35 AM    Hemoglobin A1c 6.5 (H) 05/28/2019 11:44 AM    Glucose 147 (H) 06/03/2021 08:35 AM    Glucose (POC) 119 (H) 06/13/2019 10:36 AM    Microalb/Creat ratio (ug/mg creat.) 10.7 11/26/2019 12:54 PM    LDL, calculated 50.6 06/03/2021 08:35 AM    Creatinine (POC) 1.0 04/24/2020 01:47 PM    Creatinine 0.93 06/03/2021 08:35 AM      Lab Results   Component Value Date/Time    Cholesterol, total 116 06/03/2021 08:35 AM    HDL Cholesterol 49 06/03/2021 08:35 AM    LDL, calculated 50.6 06/03/2021 08:35 AM    Triglyceride 82 06/03/2021 08:35 AM    CHOL/HDL Ratio 2.4 06/03/2021 08:35 AM     Lab Results   Component Value Date/Time    GFR est non-AA >60 06/03/2021 08:35 AM    GFRNA, POC >60 04/24/2020 01:47 PM    GFR est AA >60 06/03/2021 08:35 AM    GFRAA, POC >60 04/24/2020 01:47 PM Creatinine 0.93 06/03/2021 08:35 AM    Creatinine (POC) 1.0 04/24/2020 01:47 PM    BUN 18 06/03/2021 08:35 AM    Sodium 139 06/03/2021 08:35 AM    Potassium 4.8 06/03/2021 08:35 AM    Chloride 105 06/03/2021 08:35 AM    CO2 30 06/03/2021 08:35 AM        ROS    Physical Exam  Vitals and nursing note reviewed. Constitutional:       General: He is not in acute distress. Appearance: Normal appearance. He is well-developed. Comments: Appears stated age   HENT:      Head: Normocephalic. Cardiovascular:      Rate and Rhythm: Normal rate and regular rhythm. Heart sounds: Normal heart sounds. Pulmonary:      Effort: Pulmonary effort is normal.      Breath sounds: Normal breath sounds. Abdominal:      Palpations: Abdomen is soft. Neurological:      Mental Status: He is alert. ASSESSMENT and PLAN  Diagnoses and all orders for this visit:    1. Coronary artery disease involving native coronary artery of native heart without angina pectoris  -     METABOLIC PANEL, COMPREHENSIVE; Future   James Carvajal MD  2. Hypertension, unspecified type  -     METABOLIC PANEL, COMPREHENSIVE; Future   controlled  3. Pure hypercholesterolemia  -     METABOLIC PANEL, COMPREHENSIVE; Future   LDL at goal  4. Prediabetes  -     HEMOGLOBIN A1C WITH EAG; Future   Low carb diet recommended  5. Peripheral polyneuropathy  -     METABOLIC PANEL, COMPREHENSIVE; Future   Due to hyperglycemia most likely   Will establish with Dr Vaishali Armijo and review treatment    Follow-up and Dispositions    · Return in about 6 months (around 6/7/2022) for cpe/wellness.

## 2021-12-08 NOTE — PROGRESS NOTES
Called, spoke with Pt. Two pt identifiers confirmed. Pt given lab results and recommendations per Dr. Margi Henriquez. Pt verbalized understanding of information discussed w/ no further questions at this time.

## 2021-12-08 NOTE — PROGRESS NOTES
Tel pt a1c up to 6.6--c/w DM-2 now--needs to work on diet , exercise to lower glucose levels  Normal kidney liver lytes  Continue current medicines

## 2021-12-14 ENCOUNTER — OFFICE VISIT (OUTPATIENT)
Dept: NEUROLOGY | Age: 81
End: 2021-12-14
Payer: MEDICARE

## 2021-12-14 VITALS
DIASTOLIC BLOOD PRESSURE: 67 MMHG | HEART RATE: 69 BPM | RESPIRATION RATE: 16 BRPM | SYSTOLIC BLOOD PRESSURE: 115 MMHG | WEIGHT: 197 LBS | OXYGEN SATURATION: 96 % | TEMPERATURE: 98.6 F | BODY MASS INDEX: 28.2 KG/M2 | HEIGHT: 70 IN

## 2021-12-14 DIAGNOSIS — E11.42 DIABETIC PERIPHERAL NEUROPATHY ASSOCIATED WITH TYPE 2 DIABETES MELLITUS (HCC): ICD-10-CM

## 2021-12-14 DIAGNOSIS — M54.16 LUMBAR BACK PAIN WITH RADICULOPATHY AFFECTING RIGHT LOWER EXTREMITY: ICD-10-CM

## 2021-12-14 DIAGNOSIS — R52 PAIN: ICD-10-CM

## 2021-12-14 DIAGNOSIS — D89.89 IMMUNE-MEDIATED NEUROPATHY (HCC): ICD-10-CM

## 2021-12-14 DIAGNOSIS — E55.9 VITAMIN D DEFICIENCY: ICD-10-CM

## 2021-12-14 DIAGNOSIS — G60.9 IDIOPATHIC PERIPHERAL NEUROPATHY: Primary | ICD-10-CM

## 2021-12-14 DIAGNOSIS — G63 IMMUNE-MEDIATED NEUROPATHY (HCC): ICD-10-CM

## 2021-12-14 DIAGNOSIS — M48.061 DEGENERATIVE LUMBAR SPINAL STENOSIS: ICD-10-CM

## 2021-12-14 DIAGNOSIS — E53.8 VITAMIN B12 DEFICIENCY: ICD-10-CM

## 2021-12-14 PROCEDURE — G8427 DOCREV CUR MEDS BY ELIG CLIN: HCPCS | Performed by: PSYCHIATRY & NEUROLOGY

## 2021-12-14 PROCEDURE — G8419 CALC BMI OUT NRM PARAM NOF/U: HCPCS | Performed by: PSYCHIATRY & NEUROLOGY

## 2021-12-14 PROCEDURE — G8754 DIAS BP LESS 90: HCPCS | Performed by: PSYCHIATRY & NEUROLOGY

## 2021-12-14 PROCEDURE — G8536 NO DOC ELDER MAL SCRN: HCPCS | Performed by: PSYCHIATRY & NEUROLOGY

## 2021-12-14 PROCEDURE — 1101F PT FALLS ASSESS-DOCD LE1/YR: CPT | Performed by: PSYCHIATRY & NEUROLOGY

## 2021-12-14 PROCEDURE — G8752 SYS BP LESS 140: HCPCS | Performed by: PSYCHIATRY & NEUROLOGY

## 2021-12-14 PROCEDURE — G8510 SCR DEP NEG, NO PLAN REQD: HCPCS | Performed by: PSYCHIATRY & NEUROLOGY

## 2021-12-14 PROCEDURE — 99204 OFFICE O/P NEW MOD 45 MIN: CPT | Performed by: PSYCHIATRY & NEUROLOGY

## 2021-12-14 RX ORDER — CHOLECALCIFEROL (VITAMIN D3) 125 MCG
2000 CAPSULE ORAL
COMMUNITY

## 2021-12-14 RX ORDER — GABAPENTIN 600 MG/1
TABLET ORAL
Qty: 360 TABLET | Refills: 4 | Status: SHIPPED | OUTPATIENT
Start: 2021-12-14 | End: 2022-02-21 | Stop reason: SDUPTHER

## 2021-12-15 NOTE — PROGRESS NOTES
Consult  REFERRED BY:  David Westfall MD    CHIEF COMPLAINT: Numbness in his feet and burning pain for 15 years. Subjective:     Lacie Leo is a 80 y.o. right-handed  male, seen at the request of Dr. Shanita Ricardo, for evaluation of numbness and burning pain in his feet that initially came on 15 years ago which she saw several neurologist in [place], and was diagnosed with neuropathy, but never told a cause for it. He was treated with gabapentin and takes 600 mg 3 times a day to control his pain. He had previous EMG studies done many years ago but is not sure of the results. He has no family history of hereditary neuropathy. He also has back pain that radiates into his right hip and leg and is associated with a numbness in his right foot and toe. He has lumbar stenosis and is getting better with his right hip pain after he received an injection from Dr. Rosa Maria Hinton. He has a lumbar spinal stenosis at the L4-5 level with some moderate degenerative changes at other levels in addition and neuroforaminal disease also. He has had bilateral hip replacements in addition. He has never had a back operation. He has a hemoglobin A1c that elevated at 6.6, and has had elevated hemoglobin A1c's for at least the last 2 years on looking at his numbers on the chart, he had a hemoglobin A1c of 6.5 in 2019 over 2 years ago. He has been told to get on a diabetic diet by Dr. Shanita Ricardo. He has had no other toxin exposure or chemical exposure. His bowel bladder function is normal.  He has some numbness in his hands also at times. He took an increased dose of the gabapentin when he had his back pain and that seemed to help the back pain and the numbness in his feet. He has essential hypertension and hyperlipidemia and gout and colonic polyps and coronary artery disease and rosacea. He has not seem to have that much weakness in his legs but does have a little bit of difficulty with balance. He exercises regularly. His symptoms seem to be worse in the right foot but that may be because of his radiculopathy from his back problems. Past Medical History:   Diagnosis Date    Adverse effect of anesthesia     hallucinations/agitation after last surgery 2018 in hospital 3 days    Arthritis     knee and hip/right    CAD (coronary artery disease) 2001    Hypercholesteremia     Hypertension     Nausea & vomiting     Sleep apnea     Borderline/ No CPAP      Past Surgical History:   Procedure Laterality Date    COLONOSCOPY N/A 6/11/2020    COLONOSCOPY performed by Terrell Lewis MD at [facility]  6/11/2020         HX APPENDECTOMY      HX CORONARY ARTERY BYPASS GRAFT  2001    5v    HX GI      umbilical hernia x3    HX HIP REPLACEMENT Left 2017    HX KNEE ARTHROSCOPY Right 2007    right knee    OH CARDIAC SURG PROCEDURE UNLIST  03/2001    Bypass    OH CARDIAC SURG PROCEDURE UNLIST  2008    stents    OH TOTAL KNEE ARTHROPLASTY Left 2017    left hip replacement    UPPER GI ENDOSCOPY,BIOPSY  6/11/2020          Family History   Problem Relation Age of Onset    Cancer Mother         LUNG    Diabetes Mother     COPD Mother     No Known Problems Father     Diabetes Maternal Grandmother     [facility] D[facility]      Social History     Tobacco Use    Smoking status: Never Smoker    Smokeless tobacco: Never Used   Substance Use Topics    Alcohol use: Yes     Alcohol/week: 3.0 standard drinks     Types: 3 Cans of beer per week     Comment: Daily 1-2 drinks         Current Outpatient Medications:     cholecalciferol, vitamin D3, (Vitamin D3) 50 mcg (2,000 unit) tab, Take 2,000 Units by mouth., Disp: , Rfl:     gabapentin (Neurontin) 600 mg tablet, 1 po bid AND 2 po qhs, Disp: 360 Tablet, Rfl: 4    celecoxib (CELEBREX) 100 mg capsule, , Disp: , Rfl:     isosorbide mononitrate ER (IMDUR) 60 mg CR tablet, Take 1 Tablet by mouth daily. , Disp: , Rfl:     b complex vitamins tablet, Take 1 Tablet by mouth daily. , Disp: , Rfl:     losartan (COZAAR) 50 mg tablet, Take 1 tablet by mouth daily, Disp: 90 Tablet, Rfl: 3    allopurinoL (ZYLOPRIM) 100 mg tablet, Take 1 tablet by mouth daily, Disp: 90 Tablet, Rfl: 3    atenoloL (TENORMIN) 25 mg tablet, TAKE 1 TABLET BY MOUTH EVERY DAY, Disp: 90 Tablet, Rfl: 3    atorvastatin (LIPITOR) 80 mg tablet, Take 1 tablet by mouth daily, Disp: 90 Tablet, Rfl: 3    metroNIDAZOLE (METROGEL) 1 % topical gel, Apply  to affected area daily. Use a thin layer to affected areas after washing, Disp: 45 g, Rfl: 0    multivitamin (ONE A DAY) tablet, Take 1 Tab by mouth daily. , Disp: , Rfl:     omega 3-dha-epa-fish oil (FISH OIL) 100-160-1,000 mg cap, Take  by mouth two (2) times a day. 2 tabs bid, Disp: , Rfl:         No Known Allergies   MRI Results (most recent):  Results from Hospital Encounter encounter on 10/29/21    MRI LUMB SPINE WO CONT    Narrative  EXAM: MRI LUMB SPINE WO CONT    INDICATION: . Spinal stenosis, lumbar region without neurogenic claudication      Exam: MRI of the lumbar spine. Sequences include sagittal and axial T1 and  T2-weighted images. Sagittal STIR. Comparisons: None    Contrast: None. Findings: There is 8 mm anterolisthesis L4 on L5. 3 mm anterolisthesis L5 on S1. Mild endplate degenerative change. . Probable L5 pars defects There is no  evidence of marrow replacement or acute fracture. Cord terminus is within normal  limits. Incidental note is made of fatty infiltration of the filum. Paraspinous  soft tissues are within normal limits. T12-L1: Desiccation of this disc without stenosis    L1-L2: Desiccation of this disc. Facet arthropathy. No stenosis    L2-L3: Disc height loss with a small bulge and facet hypertrophy. Mild canal  stenosis with narrowing of the left greater than right subarticular zones. Mild  narrowing of the foramen    L3-L4: Disc height loss with degeneration of this disc with small bulge.  There  are facet degenerative changes. Slight indentation of the ventral thecal sac. Mild foraminal narrowing    L4-L5: There is degeneration of this disc with uncovering. Diffuse bulge and  facet arthropathy. Canal stenosis is moderate with severe narrowing of the  subarticular zones. Severe right and mild to moderate left foraminal narrowing    L5-S1: Severe facet arthropathy and thickening of the ligamentum flavum. Degeneration of this disc with a diffuse bulge Canal stenosis is moderate with  narrowing of the subarticular zones. Moderate bilateral foraminal narrowing    Impression  1. Multilevel degenerative change detailed by level above most significant at  L4-L5 with moderate severe narrowing of the subarticular zones, severe right and  mild to moderate left foraminal narrowing and moderate overall canal stenosis  2. Probable bilateral L5 pars defects with moderate bilateral foraminal  narrowing L5-S1      Results from Hospital Encounter encounter on 10/29/21    MRI LUMB SPINE WO CONT    Narrative  EXAM: MRI LUMB SPINE WO CONT    INDICATION: . Spinal stenosis, lumbar region without neurogenic claudication      Exam: MRI of the lumbar spine. Sequences include sagittal and axial T1 and  T2-weighted images. Sagittal STIR. Comparisons: None    Contrast: None. Findings: There is 8 mm anterolisthesis L4 on L5. 3 mm anterolisthesis L5 on S1. Mild endplate degenerative change. . Probable L5 pars defects There is no  evidence of marrow replacement or acute fracture. Cord terminus is within normal  limits. Incidental note is made of fatty infiltration of the filum. Paraspinous  soft tissues are within normal limits. T12-L1: Desiccation of this disc without stenosis    L1-L2: Desiccation of this disc. Facet arthropathy. No stenosis    L2-L3: Disc height loss with a small bulge and facet hypertrophy. Mild canal  stenosis with narrowing of the left greater than right subarticular zones.  Mild  narrowing of the foramen    L3-L4: Disc height loss with degeneration of this disc with small bulge. There  are facet degenerative changes. Slight indentation of the ventral thecal sac. Mild foraminal narrowing    L4-L5: There is degeneration of this disc with uncovering. Diffuse bulge and  facet arthropathy. Canal stenosis is moderate with severe narrowing of the  subarticular zones. Severe right and mild to moderate left foraminal narrowing    L5-S1: Severe facet arthropathy and thickening of the ligamentum flavum. Degeneration of this disc with a diffuse bulge Canal stenosis is moderate with  narrowing of the subarticular zones. Moderate bilateral foraminal narrowing    Impression  1. Multilevel degenerative change detailed by level above most significant at  L4-L5 with moderate severe narrowing of the subarticular zones, severe right and  mild to moderate left foraminal narrowing and moderate overall canal stenosis  2. Probable bilateral L5 pars defects with moderate bilateral foraminal  narrowing L5-S1    Review of Systems:  A comprehensive review of systems was negative except for: Musculoskeletal: positive for myalgias, arthralgias and stiff joints  Neurological: positive for paresthesia, coordination problems and gait problems  Endocrine: positive for Elevated blood sugar and neuropathy   Vitals:    12/14/21 1310   BP: 115/67   Pulse: 69   Resp: 16   Temp: 98.6 °F (37 °C)   TempSrc: Temporal   SpO2: 96%   Weight: 197 lb (89.4 kg)   Height: 5' 10\" (1.778 m)     Objective:     I      NEUROLOGICAL EXAM:    Appearance: The patient is well developed, well nourished, provides a coherent history and is in no acute distress. Mental Status: Oriented to time, place and person, and the president, cognitive function is normal and speech is fluent and no aphasia or dysarthria. Mood and affect appropriate. Cranial Nerves:   Intact visual fields. Fundi are benign, disc are flat, no lesions seen on funduscopy.  LISA, EOM's full, no nystagmus, no ptosis. Facial sensation is normal. Corneal reflexes are not tested. Facial movement is symmetric. Hearing is abnormal bilaterally. Palate is midline with normal sternocleidomastoid and trapezius muscles are normal. Tongue is midline. Neck without meningismus or bruits  Temporal arteries are not tender or enlarged  TMJ areas are not tender on palpation   Motor:  5/5 strength in upper and lower proximal and distal muscles. Normal bulk and tone. No fasciculations. Rapid alternating movement is symmetric and intact bilaterally   Reflexes:   Deep tendon reflexes 1+/4 and symmetrical.  No babinski or clonus present  No Tinel's over the median nerves bilaterally. Sensory:   Abnormal to touch, pinprick and vibration and temperature in both legs to knee level. DSS is intact   Gait:  Abnormal gait for patient's age with his gait slightly wide-based. .   Tremor:   No tremor noted. Cerebellar:   Mildly abnormal cerebellar signs present on Romberg and tandem testing and finger-nose-finger exam.   Neurovascular:  Normal heart sounds and regular rhythm, peripheral pulses decreased, and no carotid bruits. Assessment:       ICD-10-CM ICD-9-CM    1. Idiopathic peripheral neuropathy  G60.9 356.9 IMMUNOELECTROPHORESIS (IMMUNOFIX.)      CK      VITAMIN B12 & FOLATE      VITAMIN D, 25 HYDROXY      SED RATE (ESR)      EMG LIMITED      gabapentin (Neurontin) 600 mg tablet      CANCELED: GM1 IGG AUTOANTIBODY      CANCELED: ANTINEURONAL CELL AB      CANCELED: MYELIN ASSOC. GLYCOPROTEIN AB,IGM   2. Diabetic peripheral neuropathy associated with type 2 diabetes mellitus (HCC)  E11.42 250.60 IMMUNOELECTROPHORESIS (IMMUNOFIX.)     357.2 CK      VITAMIN B12 & FOLATE      VITAMIN D, 25 HYDROXY      SED RATE (ESR)      EMG LIMITED      gabapentin (Neurontin) 600 mg tablet      CANCELED: GM1 IGG AUTOANTIBODY      CANCELED: ANTINEURONAL CELL AB      CANCELED: MYELIN ASSOC. GLYCOPROTEIN AB,IGM   3.  Immune-mediated neuropathy ([address] D89.89 279.8 IMMUNOELECTROPHORESIS (IMMUNOFIX.)    G63 357.4 CK      VITAMIN B12 & FOLATE      VITAMIN D, 25 HYDROXY      SED RATE (ESR)      EMG LIMITED      gabapentin (Neurontin) 600 mg tablet      CANCELED: GM1 IGG AUTOANTIBODY      CANCELED: ANTINEURONAL CELL AB      CANCELED: MYELIN ASSOC. GLYCOPROTEIN AB,IGM   4. Lumbar back pain with radiculopathy affecting right lower extremity  M54.16 724.4 IMMUNOELECTROPHORESIS (IMMUNOFIX.)      CK      VITAMIN B12 & FOLATE      VITAMIN D, 25 HYDROXY      SED RATE (ESR)      EMG LIMITED      gabapentin (Neurontin) 600 mg tablet      CANCELED: GM1 IGG AUTOANTIBODY      CANCELED: ANTINEURONAL CELL AB      CANCELED: MYELIN ASSOC. GLYCOPROTEIN AB,IGM   5. Degenerative lumbar spinal stenosis  M48.061 724.02 IMMUNOELECTROPHORESIS (IMMUNOFIX.)      CK      VITAMIN B12 & FOLATE      VITAMIN D, 25 HYDROXY      SED RATE (ESR)      EMG LIMITED      gabapentin (Neurontin) 600 mg tablet      CANCELED: GM1 IGG AUTOANTIBODY      CANCELED: ANTINEURONAL CELL AB      CANCELED: MYELIN ASSOC. GLYCOPROTEIN AB,IGM   6. Vitamin D deficiency  E55.9 268.9 VITAMIN D, 25 HYDROXY      gabapentin (Neurontin) 600 mg tablet   7. Vitamin B12 deficiency  E53.8 266.2 VITAMIN B12 & FOLATE      gabapentin (Neurontin) 600 mg tablet   8. Pain  R52 780.96 gabapentin (Neurontin) 600 mg tablet     Active Problems:    * No active hospital problems. 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 We will do multiple metabolic panels to rule out other treatable causes, the most likely cause of his neuropathy is his diabetes it seems. He is advised that the single best treatment for this type of neuropathy is good control of his blood sugars in preventing the neuropathy from getting worse, and that the medication only treats the symptoms.   We encouraged to take a multivitamin every day and continue his vitamin D and stay active physically mentally, exercise regular, and eat a good diet and avoid carbohydrates particularly simple carbohydrates and to monitor his blood sugars. He will get an EMG and nerve conduction study we get an idea of his neuropathy. We discussed his condition with the patient and his wife in detail and they agree with plans of therapy as above. He may even need a skin biopsy for nerve fiber density if his tests did not show conclusive neuropathy. His MRI scan of the lumbar spine was reviewed personally on the computer and PACS system, I agree with reports that he does indeed have a spinal stenosis at L4-5 is most likely causing a lot of his symptoms, and he should continue work with his orthopedic surgeon and pain management doctor. His EMG will evaluate how much radiculopathy may be contributing to his neuropathy. Refills of his medication were given to the patient, and he was given extra medication to be able to take next dose if he needs to for pain. The patient was 58 minutes, over his history, talking to his wife, talking to the patient, reviewing all of his medical records, reviewing his lab studies, reviewing his x-rays, reviewing his MRI scan, and discussing his diagnosis prognosis treatment options and further evaluation.     Signed By: Yang Mccrary MD     December 14, 2021       CC: Ady Landaverde MD  FAX: [phone]

## 2021-12-20 ENCOUNTER — TELEPHONE (OUTPATIENT)
Dept: NEUROLOGY | Age: 81
End: 2021-12-20

## 2022-01-10 ENCOUNTER — OFFICE VISIT (OUTPATIENT)
Dept: NEUROLOGY | Age: 82
End: 2022-01-10
Payer: MEDICARE

## 2022-01-10 DIAGNOSIS — E11.42 DIABETIC PERIPHERAL NEUROPATHY ASSOCIATED WITH TYPE 2 DIABETES MELLITUS (HCC): ICD-10-CM

## 2022-01-10 PROCEDURE — 95886 MUSC TEST DONE W/N TEST COMP: CPT | Performed by: PSYCHIATRY & NEUROLOGY

## 2022-01-10 PROCEDURE — 95913 NRV CNDJ TEST 13/> STUDIES: CPT | Performed by: PSYCHIATRY & NEUROLOGY

## 2022-01-10 NOTE — PROCEDURES
Electrodiagnostic Study Report  Test Date:  1/10/2022    Patient: Ken Hall : 1940 Physician: Dr. Leora Lehman   Sex: Male Tech: [facility] Tech Ref Phys: Dr. Juan Bernal       History: This is a 80year old Male with a complaint of lower extremity pain that was initially more intense on the right but this has resolved with treatment. He now complains of left lower extremity pain. He suffers from prediabetes, gout and hypertension. EMG & NCV Findings:    Sensory  · Left median antesensory showed delayed peak latency with markedly reduced amplitude (19% of lower end of normal). · Left ulnar anti sensory showed values that were within normal range of reference for both latency and peak amplitude. · Left radial antisensory showed delayed peak latency and markedly reduced amplitude (15% of the lower end of normal). · Right sural and superior fibular antesensory conduction studies could not detect the nerves. · Left sural and left fibular antesensory conduction studies could not detect the nerves. Motor  · Left median CMAP was within normal range of reference for latency, amplitude and conduction velocity. · Left ulnar CMAP was within normal range of reference for latency, amplitude was 64% of the lower range of the normal reference and conduction velocity was slightly blocked at the elbow. · Right fibular CMAP was not measurable and could not be detected. · Left fibular CMAP displayed normal onset latency with amplitude there was 18% of the lower end of normal. Conduction velocity was within the normal range of reference. · Right tibial CMAP was not measurable and could not be detected. · Left tibial CMAP was normal for onset latency, amplitude was 9% of the lower end of normal reference, conduction velocity could not be assessed. EMG  Decreased insertional activity in of the left abductor hallucis and left medial gastrocnemius.  The left anterior tibialis MUAPs displayed reduced recruitment and increased amplitude. Fasiculations were seen in the anterior tibialis bilaterally. Thoracic paraspinals were sampled and showed no fasciculations. 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       __________________  Carolina Amezcua M.D.      Nerve Conduction Studies  Anti Sensory Summary Table     Site NR Peak (ms) Norm Peak (ms) O-P Amp (µV) Norm O-P Amp Site1 Site2 Dist (cm)   Left Median Anti Sensory (2nd Digit)  33.3°C   Wrist    5.1 <4 2.1 >11 Wrist 2nd Digit 14.0   Left Radial Anti Sensory (Base 1st Digit)  33.3°C   Wrist    3.1 <2.9 2.3 >15 Wrist Base 1st Digit 10.0   Left Sup Fibular Anti Sensory (Lat ankle)  33.3°C   Lower leg NR  <4.6  >4 Lower leg Lat ankle 10.0   Right Sup Fibular Anti Sensory (Lat ankle)  33.3°C   Lower leg NR  <4.6  >4 Lower leg Lat ankle 10.0   Left Sural Anti Sensory (Lat Mall)  33.3°C   Calf NR  <4.4  >6 Calf Lat Mall 14.0   Right Sural Anti Sensory (Lat Mall)  33.3°C   Calf NR  <4.4  >6 Calf Lat Mall 14.0   Left Ulnar Anti Sensory (5th Digit)  33.3°C   Wrist    3.5 <4.0 10.7 >10 Wrist 5th Digit 14.0     Motor Summary Table     Site NR Onset (ms) Norm Onset (ms) O-P* Amp (mV) Norm O-P Amp P-T Amp (mV) Site1 Site2 Dist (cm) Paolo (m/s)   Left Fibular Motor (Ext Dig Brev)  33.3°C   Ankle    6.3 <6.5 0.2 >1.1  Ankle Ext Dig Brev 8.0 13   B Fib    17.6  0.3   B Fib Ankle 0.0    Poplt    19.6  0.3   Poplt B Fib 10.0 50   Right Fibular Motor (Ext Dig Brev)  33.3°C   Ankle NR  <6.5  >1.1  Ankle Ext Dig Brev 8.0    B Fib NR      B Fib Ankle 0.0    Poplt NR      Poplt B Fib 10.0    Left Median Motor (Abd Poll Brev)  33.3°C   Wrist    4.3 <4.5 5.8 >4.1  Wrist Abd Poll Brev 8.0 19   Elbow    9.0  2.2   Elbow Wrist 25.0 53   Left Tibial Motor (Abd Kaiser Brev)  33.3°C   Ankle    5.9 <6.1 0.1 >1.1  Ankle Abd Kaiser Brev 8.0 14   Knee    9.9  0.0   Knee Ankle 0.0 Right Tibial Motor (Abd Kaiser Brev)  33.3°C   Ankle NR  <6.1  >1.1  Ankle Abd Kaiser Brev 8.0    Knee NR      Knee Ankle 0.0    Left Ulnar Motor (Abd Dig Minimi)  33.3°C   Wrist    3.0 <3.1 4.5 >7.0  Wrist Abd Dig Minimi 8.0 27   B Elbow    7.5  3.8   B Elbow Wrist 24.0 53   A Elbow    10.0  3.9   A Elbow B Elbow 10.0 40     EMG     Side Muscle Nerve Root Ins Act Fibs Psw Recrt Duration Amp Poly Comment   Left Ext Dig Brev Dp Br Peron L5, S1 Nml Nml Nml Nml Nml Nml Nml    Left AbdHallucis MedPlantar S1-2 Decr Nml Nml Decr Nml Nml Nml    Left MedGastroc Tibial S1-2 Decr Nml Nml Decr Nml Nml Nml    Left AntTibialis Dp Br Peron L4-5 Nml Nml Nml Reduced Nml Incr Nml fascic   Left VastusLat Femoral L2-4 Nml Nml Nml Nml Nml Nml Nml    Right AntTibialis Dp Br Peron L4-5 Nml Nml Nml Nml Nml Nml Nml fascic       Waveforms:

## 2022-02-11 ENCOUNTER — PATIENT MESSAGE (OUTPATIENT)
Dept: NEUROLOGY | Age: 82
End: 2022-02-11

## 2022-02-11 DIAGNOSIS — G60.9 IDIOPATHIC PERIPHERAL NEUROPATHY: ICD-10-CM

## 2022-02-11 DIAGNOSIS — E53.8 VITAMIN B12 DEFICIENCY: ICD-10-CM

## 2022-02-11 DIAGNOSIS — E55.9 VITAMIN D DEFICIENCY: ICD-10-CM

## 2022-02-11 DIAGNOSIS — I10 ESSENTIAL HYPERTENSION: ICD-10-CM

## 2022-02-11 DIAGNOSIS — E78.00 PURE HYPERCHOLESTEROLEMIA: ICD-10-CM

## 2022-02-11 DIAGNOSIS — Z87.39 HX OF GOUT: ICD-10-CM

## 2022-02-11 DIAGNOSIS — M48.061 DEGENERATIVE LUMBAR SPINAL STENOSIS: ICD-10-CM

## 2022-02-11 DIAGNOSIS — G63 IMMUNE-MEDIATED NEUROPATHY (HCC): ICD-10-CM

## 2022-02-11 DIAGNOSIS — D89.89 IMMUNE-MEDIATED NEUROPATHY (HCC): ICD-10-CM

## 2022-02-11 DIAGNOSIS — E11.42 DIABETIC PERIPHERAL NEUROPATHY ASSOCIATED WITH TYPE 2 DIABETES MELLITUS (HCC): ICD-10-CM

## 2022-02-11 DIAGNOSIS — R52 PAIN: ICD-10-CM

## 2022-02-11 DIAGNOSIS — M54.16 LUMBAR BACK PAIN WITH RADICULOPATHY AFFECTING RIGHT LOWER EXTREMITY: ICD-10-CM

## 2022-02-11 RX ORDER — ALLOPURINOL 100 MG/1
TABLET ORAL
Qty: 90 TABLET | Refills: 3 | Status: SHIPPED | OUTPATIENT
Start: 2022-02-11

## 2022-02-11 RX ORDER — LOSARTAN POTASSIUM 50 MG/1
50 TABLET ORAL DAILY
Qty: 90 TABLET | Refills: 3 | Status: SHIPPED | OUTPATIENT
Start: 2022-02-11

## 2022-02-11 RX ORDER — ISOSORBIDE MONONITRATE 60 MG/1
60 TABLET, EXTENDED RELEASE ORAL DAILY
Qty: 90 TABLET | Refills: 3 | Status: SHIPPED | OUTPATIENT
Start: 2022-02-11

## 2022-02-11 RX ORDER — ATORVASTATIN CALCIUM 80 MG/1
TABLET, FILM COATED ORAL
Qty: 90 TABLET | Refills: 3 | Status: SHIPPED | OUTPATIENT
Start: 2022-02-11

## 2022-02-11 RX ORDER — ATENOLOL 25 MG/1
TABLET ORAL
Qty: 90 TABLET | Refills: 3 | Status: SHIPPED | OUTPATIENT
Start: 2022-02-11 | End: 2022-05-19

## 2022-02-11 NOTE — TELEPHONE ENCOUNTER
PCP: Marjorie Gomez MD    Last appt: 12/7/2021  Future Appointments   Date Time Provider Ramya Kang   6/16/2022 11:20 AM Ang Fontana MD [facility]       Requested Prescriptions     Pending Prescriptions Disp Refills    allopurinoL (ZYLOPRIM) 100 mg tablet 90 Tablet 3     Sig: Take 1 tablet by mouth daily    atenoloL (TENORMIN) 25 mg tablet 90 Tablet 3     Sig: TAKE 1 TABLET BY MOUTH EVERY DAY    atorvastatin (LIPITOR) 80 mg tablet 90 Tablet 3     Sig: Take 1 tablet by mouth daily    isosorbide mononitrate ER (IMDUR) 60 mg CR tablet       Sig: Take 1 Tablet by mouth daily.  losartan (COZAAR) 50 mg tablet 90 Tablet 3     Sig: Take 1 Tablet by mouth daily.        Prior labs and Blood pressures:  BP Readings from Last 3 Encounters:   12/14/21 115/67   12/07/21 104/60   09/16/21 98/60     Lab Results   Component Value Date/Time    Sodium 142 12/07/2021 12:12 PM    Potassium 4.8 12/07/2021 12:12 PM    Chloride 106 12/07/2021 12:12 PM    CO2 29 12/07/2021 12:12 PM    Anion gap 7 12/07/2021 12:12 PM    Glucose 123 (H) 12/07/2021 12:12 PM    BUN 17 12/07/2021 12:12 PM    Creatinine 0.94 12/07/2021 12:12 PM    BUN/Creatinine ratio 18 12/07/2021 12:12 PM    GFR est AA >60 12/07/2021 12:12 PM    GFR est non-AA >60 12/07/2021 12:12 PM    Calcium 9.2 12/07/2021 12:12 PM     Lab Results   Component Value Date/Time    Hemoglobin A1c 6.6 (H) 12/07/2021 12:12 PM    Hemoglobin A1c (POC) 6.3 (A) 11/26/2019 11:57 AM     Lab Results   Component Value Date/Time    Cholesterol, total 116 06/03/2021 08:35 AM    HDL Cholesterol 49 06/03/2021 08:35 AM    LDL, calculated 50.6 06/03/2021 08:35 AM    VLDL, calculated 16.4 06/03/2021 08:35 AM    Triglyceride 82 06/03/2021 08:35 AM    CHOL/HDL Ratio 2.4 06/03/2021 08:35 AM     Lab Results   Component Value Date/Time    Vitamin D 25-Hydroxy 32.1 12/14/2021 02:40 PM       Lab Results   Component Value Date/Time    TSH 1.63 06/03/2021 08:35 AM

## 2022-02-11 NOTE — TELEPHONE ENCOUNTER
States that insurance has changed and he has a new pharmacy. States all scripts need to be sent to the new pharmacy, [address] [facility] [place]. 1. isosorbide mononitrate ER (IMDUR) 60 mg CR tablet  2. allopurinoL (ZYLOPRIM) 100 mg tablet  3. atenoloL (TENORMIN) 25 mg tablet  4. atorvastatin (LIPITOR) 80 mg tablet  5. losartan (COZAAR) 50 mg tablet    Pt said not worried about the vitamins, so I didn't pend them at this time. Please send to new pharmacy:  [facility].

## 2022-03-15 ENCOUNTER — HOSPITAL ENCOUNTER (EMERGENCY)
Age: 82
Discharge: HOME | End: 2022-03-15
Attending: EMERGENCY MEDICINE
Payer: MEDICARE

## 2022-03-15 ENCOUNTER — APPOINTMENT (OUTPATIENT)
Dept: CT IMAGING | Age: 82
End: 2022-03-15
Attending: EMERGENCY MEDICINE
Payer: MEDICARE

## 2022-03-15 VITALS
HEART RATE: 60 BPM | BODY MASS INDEX: 28.44 KG/M2 | RESPIRATION RATE: 16 BRPM | TEMPERATURE: 97.5 F | SYSTOLIC BLOOD PRESSURE: 152 MMHG | HEIGHT: 70 IN | DIASTOLIC BLOOD PRESSURE: 68 MMHG | WEIGHT: 198.63 LBS | OXYGEN SATURATION: 96 %

## 2022-03-15 DIAGNOSIS — N20.0 LEFT RENAL STONE: ICD-10-CM

## 2022-03-15 DIAGNOSIS — N28.89 LEFT RENAL MASS: ICD-10-CM

## 2022-03-15 DIAGNOSIS — K57.30 SIGMOID DIVERTICULUM: ICD-10-CM

## 2022-03-15 DIAGNOSIS — R31.0 GROSS HEMATURIA: Primary | ICD-10-CM

## 2022-03-15 LAB
ALBUMIN SERPL-MCNC: 4.1 G/DL (ref 3.5–5)
ALBUMIN/GLOB SERPL: 0.9 {RATIO} (ref 1.1–2.2)
ALP SERPL-CCNC: 93 U/L (ref 45–117)
ALT SERPL-CCNC: 30 U/L (ref 12–78)
ANION GAP SERPL CALC-SCNC: 2 MMOL/L (ref 5–15)
APPEARANCE UR: ABNORMAL
AST SERPL-CCNC: 27 U/L (ref 15–37)
BACTERIA URNS QL MICRO: NEGATIVE /HPF
BASOPHILS # BLD: 0.1 K/UL (ref 0–0.1)
BASOPHILS NFR BLD: 1 % (ref 0–1)
BILIRUB SERPL-MCNC: 0.8 MG/DL (ref 0.2–1)
BILIRUB UR QL: NEGATIVE
BUN SERPL-MCNC: 18 MG/DL (ref 6–20)
BUN/CREAT SERPL: 16 (ref 12–20)
CALCIUM SERPL-MCNC: 10 MG/DL (ref 8.5–10.1)
CHLORIDE SERPL-SCNC: 102 MMOL/L (ref 97–108)
CO2 SERPL-SCNC: 31 MMOL/L (ref 21–32)
COLOR UR: ABNORMAL
CREAT SERPL-MCNC: 1.12 MG/DL (ref 0.7–1.3)
DIFFERENTIAL METHOD BLD: NORMAL
EOSINOPHIL # BLD: 0.1 K/UL (ref 0–0.4)
EOSINOPHIL NFR BLD: 1 % (ref 0–7)
EPITH CASTS URNS QL MICRO: ABNORMAL /LPF
ERYTHROCYTE [DISTWIDTH] IN BLOOD BY AUTOMATED COUNT: 13.5 % (ref 11.5–14.5)
GLOBULIN SER CALC-MCNC: 4.7 G/DL (ref 2–4)
GLUCOSE SERPL-MCNC: 147 MG/DL (ref 65–100)
GLUCOSE UR STRIP.AUTO-MCNC: NEGATIVE MG/DL
HCT VFR BLD AUTO: 49 % (ref 36.6–50.3)
HGB BLD-MCNC: 16.4 G/DL (ref 12.1–17)
HGB UR QL STRIP: ABNORMAL
HYALINE CASTS URNS QL MICRO: ABNORMAL /LPF (ref 0–5)
IMM GRANULOCYTES # BLD AUTO: 0 K/UL (ref 0–0.04)
IMM GRANULOCYTES NFR BLD AUTO: 0 % (ref 0–0.5)
KETONES UR QL STRIP.AUTO: NEGATIVE MG/DL
LEUKOCYTE ESTERASE UR QL STRIP.AUTO: ABNORMAL
LIPASE SERPL-CCNC: 103 U/L (ref 73–393)
LYMPHOCYTES # BLD: 2.7 K/UL (ref 0.8–3.5)
LYMPHOCYTES NFR BLD: 32 % (ref 12–49)
MCH RBC QN AUTO: 29.8 PG (ref 26–34)
MCHC RBC AUTO-ENTMCNC: 33.5 G/DL (ref 30–36.5)
MCV RBC AUTO: 89.1 FL (ref 80–99)
MONOCYTES # BLD: 0.8 K/UL (ref 0–1)
MONOCYTES NFR BLD: 10 % (ref 5–13)
NEUTS SEG # BLD: 4.9 K/UL (ref 1.8–8)
NEUTS SEG NFR BLD: 56 % (ref 32–75)
NITRITE UR QL STRIP.AUTO: NEGATIVE
NRBC # BLD: 0 K/UL (ref 0–0.01)
NRBC BLD-RTO: 0 PER 100 WBC
PH UR STRIP: 5.5 [PH] (ref 5–8)
PLATELET # BLD AUTO: 226 K/UL (ref 150–400)
PMV BLD AUTO: 10.1 FL (ref 8.9–12.9)
POTASSIUM SERPL-SCNC: 4.2 MMOL/L (ref 3.5–5.1)
PROT SERPL-MCNC: 8.8 G/DL (ref 6.4–8.2)
PROT UR STRIP-MCNC: 100 MG/DL
RBC # BLD AUTO: 5.5 M/UL (ref 4.1–5.7)
RBC #/AREA URNS HPF: ABNORMAL /HPF (ref 0–5)
SODIUM SERPL-SCNC: 135 MMOL/L (ref 136–145)
SP GR UR REFRACTOMETRY: 1.01 (ref 1–1.03)
UA: UC IF INDICATED,UAUC: ABNORMAL
UROBILINOGEN UR QL STRIP.AUTO: 0.2 EU/DL (ref 0.2–1)
WBC # BLD AUTO: 8.7 K/UL (ref 4.1–11.1)
WBC URNS QL MICRO: ABNORMAL /HPF (ref 0–4)

## 2022-03-15 PROCEDURE — 74011250636 HC RX REV CODE- 250/636: Performed by: EMERGENCY MEDICINE

## 2022-03-15 PROCEDURE — 99284 EMERGENCY DEPT VISIT MOD MDM: CPT

## 2022-03-15 PROCEDURE — 81001 URINALYSIS AUTO W/SCOPE: CPT

## 2022-03-15 PROCEDURE — 83690 ASSAY OF LIPASE: CPT

## 2022-03-15 PROCEDURE — 85025 COMPLETE CBC W/AUTO DIFF WBC: CPT

## 2022-03-15 PROCEDURE — 74176 CT ABD & PELVIS W/O CONTRAST: CPT

## 2022-03-15 PROCEDURE — 36415 COLL VENOUS BLD VENIPUNCTURE: CPT

## 2022-03-15 PROCEDURE — 80053 COMPREHEN METABOLIC PANEL: CPT

## 2022-03-15 RX ORDER — CIPROFLOXACIN 500 MG/1
500 TABLET ORAL 2 TIMES DAILY
Qty: 20 TABLET | Refills: 0 | Status: SHIPPED | OUTPATIENT
Start: 2022-03-15 | End: 2022-03-25

## 2022-03-15 RX ORDER — METRONIDAZOLE 500 MG/1
500 TABLET ORAL 3 TIMES DAILY
Qty: 30 TABLET | Refills: 0 | Status: SHIPPED | OUTPATIENT
Start: 2022-03-15 | End: 2022-03-25

## 2022-03-15 RX ADMIN — SODIUM CHLORIDE 500 ML: 9 INJECTION, SOLUTION INTRAVENOUS at 11:50

## 2022-03-15 NOTE — ED PROVIDER NOTES
EMERGENCY DEPARTMENT HISTORY AND PHYSICAL EXAM      Date: 3/15/2022  Patient Name: Melita Trujillo    History of Presenting Illness     Chief Complaint   Patient presents with    Blood in Urine     pt ambulatory into triage with cc of hematuria in urine; pt reports blood as dark; pt states he was having intermittent lower abd pain over the past 2 weeks, denies pain currently. History Provided By: Patient and Patient's Wife    HPI: Melita Trujillo, 80 y.o. male presents to the ED with cc of hematuria. Patient states that over the last 2 weeks, has had intermittent right lower quadrant pain. Denies any pain now,. He states he attributed it to possible diverticulitis, but did not seek medical attention for it. Since last night, he has had 3 episodes of gross hematuria. Denies back pain, flank pain, use of blood thinners, fever or chills. He denies dysuria or change in bowel habits. Denies lightheadedness, dizziness, chest pain, cough or shortness of breath. He does not have a urologist    There are no other complaints, changes, or physical findings at this time. PCP: Berenice Weeks MD    No current facility-administered medications on file prior to encounter. Current Outpatient Medications on File Prior to Encounter   Medication Sig Dispense Refill    gabapentin (Neurontin) 600 mg tablet 1 po bid AND 2 po qhs 360 Tablet 1    allopurinoL (ZYLOPRIM) 100 mg tablet Take 1 tablet by mouth daily 90 Tablet 3    atenoloL (TENORMIN) 25 mg tablet TAKE 1 TABLET BY MOUTH EVERY DAY 90 Tablet 3    atorvastatin (LIPITOR) 80 mg tablet Take 1 tablet by mouth daily 90 Tablet 3    isosorbide mononitrate ER (IMDUR) 60 mg CR tablet Take 1 Tablet by mouth daily. 90 Tablet 3    losartan (COZAAR) 50 mg tablet Take 1 Tablet by mouth daily. 90 Tablet 3    cholecalciferol, vitamin D3, (Vitamin D3) 50 mcg (2,000 unit) tab Take 2,000 Units by mouth.       celecoxib (CELEBREX) 100 mg capsule       b complex vitamins tablet Take 1 Tablet by mouth daily.  metroNIDAZOLE (METROGEL) 1 % topical gel Apply  to affected area daily. Use a thin layer to affected areas after washing 45 g 0    multivitamin (ONE A DAY) tablet Take 1 Tab by mouth daily.  omega 3-dha-epa-fish oil (FISH OIL) 100-160-1,000 mg cap Take  by mouth two (2) times a day. 2 tabs bid         Past History     Past Medical History:  Past Medical History:   Diagnosis Date    Adverse effect of anesthesia     hallucinations/agitation after last surgery 2018 in hospital 3 days    Arthritis     knee and hip/right    CAD (coronary artery disease) 2001    Hypercholesteremia     Hypertension     Nausea & vomiting     Sleep apnea     Borderline/ No CPAP       Past Surgical History:  Past Surgical History:   Procedure Laterality Date    COLONOSCOPY N/A 6/11/2020    COL[facility]illas [facility] ENDOSCOPY    COLONOSCOPY,DIAGNOSTIC  6/11/2020         HX APPENDECTOMY      HX CORONARY ARTERY BYPASS GRAFT  2001    5v    HX GI      umbilical hernia x3    HX HIP REPLACEMENT Left 2017    HX KNEE ARTHROSCOPY Right 2007    right knee    MO CARDIAC SURG PROCEDURE UNLIST  03/2001    Bypass    MO CARDIAC SURG PROCEDURE UNLIST  2008    stents    MO TOTAL KNEE ARTHROPLASTY Left 2017    left hip replacement    UPPER GI ENDOSCOPY,BIOPSY  6/11/2020            Family History:  Family History   Problem Relation Age of Onset    Cancer Mother         LUNG    Diabetes Mother     COPD Mother     No Known Problems Father     Diabetes Maternal Grandmother     Other Daughter         [facility]       Social History:  Social History     Tobacco Use    Smoking status: Never Smoker    Smokeless tobacco: Never Used   Vaping Use    Vaping Use: Never used   Substance Use Topics    Alcohol use:  Yes     Alcohol/week: 3.0 standard drinks     Types: 3 Cans of beer per week     Comment: Daily 1-2 drinks    Drug use: Never       Allergies:  No Known Allergies      Review of Systems   Review of Systems   Constitutional: Negative for fever. HENT: Negative for congestion. Eyes: Negative. Respiratory: Negative for shortness of breath. Cardiovascular: Negative for chest pain. Gastrointestinal: Positive for abdominal pain. Endocrine: Negative for heat intolerance. Genitourinary: Positive for hematuria. Musculoskeletal: Negative for back pain. Skin: Negative for rash. Allergic/Immunologic: Negative for immunocompromised state. Neurological: Negative for dizziness. Hematological: Does not bruise/bleed easily. Psychiatric/Behavioral: Negative. All other systems reviewed and are negative. Physical Exam   Physical Exam  Vitals and nursing note reviewed. Constitutional:       General: He is not in acute distress. Appearance: He is well-developed. HENT:      Head: Normocephalic and atraumatic. Cardiovascular:      Rate and Rhythm: Normal rate and regular rhythm. Pulses: Normal pulses. Heart sounds: Normal heart sounds. Pulmonary:      Effort: Pulmonary effort is normal.      Breath sounds: Normal breath sounds. Abdominal:      General: Bowel sounds are normal.      Palpations: Abdomen is soft. Tenderness: There is abdominal tenderness. Comments: Right lower quadrant tenderness   Musculoskeletal:         General: Normal range of motion. Cervical back: Normal range of motion. Skin:     General: Skin is warm and dry. Neurological:      General: No focal deficit present. Mental Status: He is alert and oriented to person, place, and time. Coordination: Coordination normal.   Psychiatric:         Mood and Affect: Mood normal.         Behavior: Behavior normal.         Diagnostic Study Results     Labs -     Recent Results (from the past 12 hour(s))   CBC WITH AUTOMATED DIFF    Collection Time: 03/15/22 11:05 AM   Result Value Ref Range    WBC 8.7 4.1 - 11.1 K/uL    RBC 5.50 4. 10 - 5.70 M/uL    HGB 16.4 12.1 - 17.0 g/dL    HCT 49.0 36.6 - 50.3 %    MCV 89.1 80.0 - 99.0 FL    MCH 29.8 26.0 - 34.0 PG    MCHC 33.5 30.0 - 36.5 g/dL    RDW 13.5 11.5 - 14.5 %    PLATELET 819 038 - 640 K/uL    MPV 10.1 8.9 - 12.9 FL    NRBC 0.0 0  WBC    ABSOLUTE NRBC 0.00 0.00 - 0.01 K/uL    NEUTROPHILS 56 32 - 75 %    LYMPHOCYTES 32 12 - 49 %    MONOCYTES 10 5 - 13 %    EOSINOPHILS 1 0 - 7 %    BASOPHILS 1 0 - 1 %    IMMATURE GRANULOCYTES 0 0.0 - 0.5 %    ABS. NEUTROPHILS 4.9 1.8 - 8.0 K/UL    ABS. LYMPHOCYTES 2.7 0.8 - 3.5 K/UL    ABS. MONOCYTES 0.8 0.0 - 1.0 K/UL    ABS. EOSINOPHILS 0.1 0.0 - 0.4 K/UL    ABS. BASOPHILS 0.1 0.0 - 0.1 K/UL    ABS. IMM. GRANS. 0.0 0.00 - 0.04 K/UL    DF AUTOMATED     METABOLIC PANEL, COMPREHENSIVE    Collection Time: 03/15/22 11:05 AM   Result Value Ref Range    Sodium 135 (L) 136 - 145 mmol/L    Potassium 4.2 3.5 - 5.1 mmol/L    Chloride 102 97 - 108 mmol/L    CO2 31 21 - 32 mmol/L    Anion gap 2 (L) 5 - 15 mmol/L    Glucose 147 (H) 65 - 100 mg/dL    BUN 18 6 - 20 MG/DL    Creatinine 1.12 0.70 - 1.30 MG/DL    BUN/Creatinine ratio 16 12 - 20      GFR est AA >60 >60 ml/min/1.73m2    GFR est non-AA >60 >60 ml/min/1.73m2    Calcium 10.0 8.5 - 10.1 MG/DL    Bilirubin, total 0.8 0.2 - 1.0 MG/DL    ALT (SGPT) 30 12 - 78 U/L    AST (SGOT) 27 15 - 37 U/L    Alk.  phosphatase 93 45 - 117 U/L    Protein, total 8.8 (H) 6.4 - 8.2 g/dL    Albumin 4.1 3.5 - 5.0 g/dL    Globulin 4.7 (H) 2.0 - 4.0 g/dL    A-G Ratio 0.9 (L) 1.1 - 2.2     LIPASE    Collection Time: 03/15/22 11:05 AM   Result Value Ref Range    Lipase 103 73 - 393 U/L   URINALYSIS W/ REFLEX CULTURE    Collection Time: 03/15/22 12:19 PM    Specimen: Urine   Result Value Ref Range    Color DARK YELLOW      Appearance CLOUDY (A) CLEAR      Specific gravity 1.011 1.003 - 1.030      pH (UA) 5.5 5.0 - 8.0      Protein 100 (A) NEG mg/dL    Glucose Negative NEG mg/dL    Ketone Negative NEG mg/dL    Bilirubin Negative NEG      Blood LARGE (A) NEG      Urobilinogen 0.2 0.2 - 1.0 EU/dL    Nitrites Negative NEG      Leukocyte Esterase TRACE (A) NEG      UA:UC IF INDICATED CULTURE NOT INDICATED BY UA RESULT CNI      WBC 0-4 0 - 4 /hpf    RBC 10-20 0 - 5 /hpf    Epithelial cells FEW FEW /lpf    Bacteria Negative NEG /hpf    Hyaline cast 0-2 0 - 5 /lpf       Radiologic Studies -   CT ABD PELV WO CONT   Final Result      1. Nonobstructing left intrarenal calculi, possibly related to clinical   hematuria. 2. Hyperdense masslike structure in the left renal pelvis, nonobstructing, of   uncertain etiology and significance. Nonemergent elective renal CT protocol (CT   IVP) recommended in the future at some point. 3. Giant sigmoid diverticulum, slightly smaller and less inflamed than on the   prior study, but possibly accounting for occasional right lower quadrant pain. Correlate with colonoscopic findings 6/11/2020, to exclude associated neoplasm,   although this seems less likely in view of stability. Background severe   diverticulosis. 4. Peripancreatic stranding around the pancreatic head and uncinate process,   similar to the prior study. Correlate with clinical symptoms and laboratory   parameters. 5. Other incidental and postoperative changes. CT Results  (Last 48 hours)    None        CXR Results  (Last 48 hours)    None          Medical Decision Making   I am the first provider for this patient. I reviewed the vital signs, available nursing notes, past medical history, past surgical history, family history and social history. Vital Signs-Reviewed the patient's vital signs. Patient Vitals for the past 12 hrs:   Temp Pulse Resp BP SpO2   03/15/22 1052 97.5 °F (36.4 °C) 60 16 (!) 175/72 100 %       Records Reviewed: Nursing Notes and Old Medical Records    Provider Notes (Medical Decision Making):   Kidney stone, malignancy, anemia, UTI, pyelonephritis, diverticulitis    ED Course:   Initial assessment performed.  The patients presenting problems have been discussed, and they are in agreement with the care plan formulated and outlined with them. I have encouraged them to ask questions as they arise throughout their visit. Progress note: The patient's results were reviewed. He is advised to follow-up with urology, regarding his hematuria and left renal pelvis mass, and with Dr. Ran Pacheco, GI, for sigmoid diverticulum. He is to return to ER if worse             Critical Care Time:   0    Disposition:  home    DISCHARGE PLAN:  1. Discharge Medication List as of 3/15/2022  1:37 PM      START taking these medications    Details   metroNIDAZOLE (FlagyL) 500 mg tablet Take 1 Tablet by mouth three (3) times daily for 10 days. , Normal, Disp-30 Tablet, R-0      ciprofloxacin HCl (Cipro) 500 mg tablet Take 1 Tablet by mouth two (2) times a day for 10 days. , Normal, Disp-20 Tablet, R-0         CONTINUE these medications which have NOT CHANGED    Details   gabapentin (Neurontin) 600 mg tablet 1 po bid AND 2 po qhs, Normal, Disp-360 Tablet, R-1      allopurinoL (ZYLOPRIM) 100 mg tablet Take 1 tablet by mouth daily, Normal, Disp-90 Tablet, R-3Refill [id number]      atenoloL (TENORMIN) 25 mg tablet TAKE 1 TABLET BY MOUTH EVERY DAY, Normal, Disp-90 Tablet, R-3      atorvastatin (LIPITOR) 80 mg tablet Take 1 tablet by mouth daily, Normal, Disp-90 Tablet, R-3Refill [id number]      isosorbide mononitrate ER (IMDUR) 60 mg CR tablet Take 1 Tablet by mouth daily. , Normal, Disp-90 Tablet, R-3      losartan (COZAAR) 50 mg tablet Take 1 Tablet by mouth daily. , Normal, Disp-90 Tablet, R-3Refill [id number]      cholecalciferol, vitamin D3, (Vitamin D3) 50 mcg (2,000 unit) tab Take 2,000 Units by mouth., Historical Med      celecoxib (CELEBREX) 100 mg capsule Historical Med      b complex vitamins tablet Take 1 Tablet by mouth daily. , Historical Med      multivitamin (ONE A DAY) tablet Take 1 Tab by mouth daily. , Historical Med      omega 3-dha-epa-fish oil (FISH OIL) 100-160-1,000 mg cap Take by mouth two (2) times a day. 2 tabs bid, Historical Med           2. Follow-up Information     Follow up With Specialties Details Why Contact Info    Dexter Cerda MD Internal Medicine  As needed [address]  [address] [id number]      Phu Armijo MD Urology Call today To be scheduled for a renal CT with IVP, to evaluate your left renal mass [id number]      Tammy Fisher MD Gastroenterology Call in 2 days Regarding your sigmoid diverticulum [address]  [facility]  [phone]      [facility] EMERGENCY DEPT Emergency Medicine   [address]  [address]   [address] [address] [phone]        3. Return to ED if worse     Diagnosis     Clinical Impression:   1. Gross hematuria    2. Left renal stone    3. Left renal mass    4. Sigmoid diverticulum        Attestations:    Morgan Hall MD    Please note that this dictation was completed with Viroblock, the computer voice recognition software. Quite often unanticipated grammatical, syntax, homophones, and other interpretive errors are inadvertently transcribed by the computer software. Please disregard these errors. Please excuse any errors that have escaped final proofreading. Thank you.

## 2022-03-15 NOTE — ED NOTES
I have reviewed verbal and written discharge instructions with the patient and spouse. The patient and spouse verbalized understanding. IV removed. Pt alert and ambulatory upon discharge.

## 2022-03-18 PROBLEM — G60.9 IDIOPATHIC PERIPHERAL NEUROPATHY: Status: ACTIVE | Noted: 2019-05-28

## 2022-03-18 PROBLEM — Z87.39 HX OF GOUT: Status: ACTIVE | Noted: 2019-05-28

## 2022-03-18 PROBLEM — E53.8 VITAMIN B12 DEFICIENCY: Status: ACTIVE | Noted: 2021-12-14

## 2022-03-18 PROBLEM — I10 HTN (HYPERTENSION): Status: ACTIVE | Noted: 2019-05-28

## 2022-03-18 PROBLEM — I25.10 CORONARY ARTERY DISEASE INVOLVING NATIVE CORONARY ARTERY OF NATIVE HEART WITHOUT ANGINA PECTORIS: Status: ACTIVE | Noted: 2019-05-28

## 2022-03-18 PROBLEM — Z96.641 STATUS POST RIGHT HIP REPLACEMENT: Status: ACTIVE | Noted: 2019-06-13

## 2022-03-19 PROBLEM — Z86.0100 HX OF COLONIC POLYPS: Status: ACTIVE | Noted: 2019-05-28

## 2022-03-19 PROBLEM — E55.9 VITAMIN D DEFICIENCY: Status: ACTIVE | Noted: 2021-12-14

## 2022-03-19 PROBLEM — M54.16 LUMBAR BACK PAIN WITH RADICULOPATHY AFFECTING RIGHT LOWER EXTREMITY: Status: ACTIVE | Noted: 2021-12-14

## 2022-03-19 PROBLEM — Z86.010 HX OF COLONIC POLYPS: Status: ACTIVE | Noted: 2019-05-28

## 2022-03-19 PROBLEM — E78.00 PURE HYPERCHOLESTEROLEMIA: Status: ACTIVE | Noted: 2019-05-28

## 2022-03-19 PROBLEM — E11.42 DIABETIC PERIPHERAL NEUROPATHY ASSOCIATED WITH TYPE 2 DIABETES MELLITUS (HCC): Status: ACTIVE | Noted: 2021-12-14

## 2022-03-20 PROBLEM — L71.9 ROSACEA: Status: ACTIVE | Noted: 2019-05-28

## 2022-03-20 PROBLEM — K57.90 DIVERTICULOSIS: Status: ACTIVE | Noted: 2019-06-29

## 2022-03-20 PROBLEM — M48.061 DEGENERATIVE LUMBAR SPINAL STENOSIS: Status: ACTIVE | Noted: 2021-12-14

## 2022-03-20 PROBLEM — D89.89 IMMUNE-MEDIATED NEUROPATHY (HCC): Status: ACTIVE | Noted: 2021-12-14

## 2022-03-20 PROBLEM — G63 IMMUNE-MEDIATED NEUROPATHY (HCC): Status: ACTIVE | Noted: 2021-12-14

## 2022-03-29 ENCOUNTER — TRANSCRIBE ORDER (OUTPATIENT)
Dept: SCHEDULING | Age: 82
End: 2022-03-29

## 2022-03-29 DIAGNOSIS — N28.89 RENAL MASS: Primary | ICD-10-CM

## 2022-04-12 ENCOUNTER — HOSPITAL ENCOUNTER (OUTPATIENT)
Dept: CT IMAGING | Age: 82
Discharge: HOME | End: 2022-04-12
Attending: UROLOGY
Payer: MEDICARE

## 2022-04-12 DIAGNOSIS — N28.89 RENAL MASS: ICD-10-CM

## 2022-04-12 PROCEDURE — 74011000636 HC RX REV CODE- 636: Performed by: UROLOGY

## 2022-04-12 PROCEDURE — 74170 CT ABD WO CNTRST FLWD CNTRST: CPT

## 2022-04-12 RX ADMIN — IOPAMIDOL 100 ML: 755 INJECTION, SOLUTION INTRAVENOUS at 10:01

## 2022-04-19 ENCOUNTER — TRANSCRIBE ORDER (OUTPATIENT)
Dept: SCHEDULING | Age: 82
End: 2022-04-19

## 2022-04-19 DIAGNOSIS — N28.89 RENAL MASS: Primary | ICD-10-CM

## 2022-04-22 ENCOUNTER — HOSPITAL ENCOUNTER (OUTPATIENT)
Dept: NUCLEAR MEDICINE | Age: 82
Discharge: HOME | End: 2022-04-22
Attending: UROLOGY
Payer: MEDICARE

## 2022-04-22 DIAGNOSIS — N28.89 RENAL MASS: ICD-10-CM

## 2022-04-22 PROCEDURE — 78708 K FLOW/FUNCT IMAGE W/DRUG: CPT

## 2022-04-22 PROCEDURE — 74011250636 HC RX REV CODE- 250/636: Performed by: UROLOGY

## 2022-04-22 RX ORDER — BETIATIDE 1 MG/1
10.9 INJECTION, POWDER, LYOPHILIZED, FOR SOLUTION INTRAVENOUS
Status: COMPLETED | OUTPATIENT
Start: 2022-04-22 | End: 2022-04-22

## 2022-04-22 RX ORDER — FUROSEMIDE 10 MG/ML
20 INJECTION INTRAMUSCULAR; INTRAVENOUS ONCE
Status: COMPLETED | OUTPATIENT
Start: 2022-04-22 | End: 2022-04-22

## 2022-04-22 RX ADMIN — FUROSEMIDE 20 MG: 10 INJECTION, SOLUTION INTRAMUSCULAR; INTRAVENOUS at 10:13

## 2022-04-22 RX ADMIN — BETIATIDE 10.9 MILLICURIE: 1 INJECTION, POWDER, LYOPHILIZED, FOR SOLUTION INTRAVENOUS at 11:00

## 2022-05-09 ENCOUNTER — DOCUMENTATION ONLY (OUTPATIENT)
Dept: INTERNAL MEDICINE CLINIC | Age: 82
End: 2022-05-09

## 2022-05-09 NOTE — PROGRESS NOTES
Returned patient's call. Patient's last instructions were to return in about 6 months (around 6/7/2022) for cpe/wellness. Patient stated he would call back to make this appointment.

## 2022-05-19 ENCOUNTER — HOSPITAL ENCOUNTER (OUTPATIENT)
Dept: PREADMISSION TESTING | Age: 82
Discharge: HOME | End: 2022-05-19
Attending: UROLOGY
Payer: MEDICARE

## 2022-05-19 ENCOUNTER — HOSPITAL ENCOUNTER (OUTPATIENT)
Dept: GENERAL RADIOLOGY | Age: 82
Discharge: HOME | End: 2022-05-19
Attending: UROLOGY
Payer: MEDICARE

## 2022-05-19 VITALS
SYSTOLIC BLOOD PRESSURE: 114 MMHG | OXYGEN SATURATION: 98 % | BODY MASS INDEX: 27.65 KG/M2 | WEIGHT: 193.12 LBS | DIASTOLIC BLOOD PRESSURE: 56 MMHG | TEMPERATURE: 98.2 F | HEIGHT: 70 IN | HEART RATE: 78 BPM | RESPIRATION RATE: 18 BRPM

## 2022-05-19 LAB
ABO + RH BLD: NORMAL
APPEARANCE UR: CLEAR
BACTERIA URNS QL MICRO: NEGATIVE /HPF
BILIRUB UR QL: NEGATIVE
BLOOD GROUP ANTIBODIES SERPL: NORMAL
COLOR UR: ABNORMAL
EPITH CASTS URNS QL MICRO: ABNORMAL /LPF
GLUCOSE UR STRIP.AUTO-MCNC: NEGATIVE MG/DL
HGB UR QL STRIP: ABNORMAL
KETONES UR QL STRIP.AUTO: ABNORMAL MG/DL
LEUKOCYTE ESTERASE UR QL STRIP.AUTO: ABNORMAL
NITRITE UR QL STRIP.AUTO: NEGATIVE
PH UR STRIP: 5.5 [PH] (ref 5–8)
PROT UR STRIP-MCNC: 100 MG/DL
RBC #/AREA URNS HPF: >100 /HPF (ref 0–5)
SP GR UR REFRACTOMETRY: 1.02
SPECIMEN EXP DATE BLD: NORMAL
UA: UC IF INDICATED,UAUC: ABNORMAL
UROBILINOGEN UR QL STRIP.AUTO: 1 EU/DL (ref 0.2–1)
WBC URNS QL MICRO: ABNORMAL /HPF (ref 0–4)

## 2022-05-19 PROCEDURE — 81001 URINALYSIS AUTO W/SCOPE: CPT

## 2022-05-19 PROCEDURE — 71046 X-RAY EXAM CHEST 2 VIEWS: CPT

## 2022-05-19 PROCEDURE — 86900 BLOOD TYPING SEROLOGIC ABO: CPT

## 2022-05-19 RX ORDER — CEFAZOLIN SODIUM/WATER 2 G/20 ML
2 SYRINGE (ML) INTRAVENOUS ONCE
Status: CANCELLED | OUTPATIENT
Start: 2022-05-25 | End: 2022-05-25

## 2022-05-19 RX ORDER — SODIUM CHLORIDE, SODIUM LACTATE, POTASSIUM CHLORIDE, CALCIUM CHLORIDE 600; 310; 30; 20 MG/100ML; MG/100ML; MG/100ML; MG/100ML
25 INJECTION, SOLUTION INTRAVENOUS CONTINUOUS
Status: CANCELLED | OUTPATIENT
Start: 2022-05-25

## 2022-05-19 NOTE — PERIOP NOTES
[facility]  Preoperative Instructions        Surgery Date 5/25/22        Time of Arrival to be called @[phone]      1. On the day of your surgery, please report to the Surgical Services Registration Desk and sign in at your designated time. The Surgery Center is located to the right of the Emergency Room. 2. You must have someone with you to drive you home. You should not drive a car for 24 hours following surgery. Please make arrangements for a friend or family member to stay with you for the first 24 hours after your surgery. 3. Do not have anything to eat or drink (including water, gum, mints, coffee, juice) after midnight . ? This may not apply to medications prescribed by your physician. ?(Please note below the special instructions with medications to take the morning of your procedure.)    4. We recommend you do not drink any alcoholic beverages for 24 hours before and after your surgery. 5. Contact your surgeons office for instructions on the following medications: non-steroidal anti-inflammatory drugs (i.e. Advil, Aleve), vitamins, and supplements. (Some surgeons will want you to stop these medications prior to surgery and others may allow you to take them)  **If you are currently taking Plavix, Coumadin, Aspirin and/or other blood-thinning agents, contact your surgeon for instructions. ** Your surgeon will partner with the physician prescribing these medications to determine if it is safe to stop or if you need to continue taking. Please do not stop taking these medications without instructions from your surgeon    6. Wear comfortable clothes. Wear glasses instead of contacts. Do not bring any money or jewelry. Please bring picture ID, insurance card, and any prearranged co-payment or hospital payment. Do not wear make-up, particularly mascara the morning of your surgery. Do not wear nail polish, particularly if you are having foot /hand surgery.   Wear your hair loose or down, no ponytails, buns, rob pins or clips. All body piercings must be removed. Please shower with antibacterial soap for three consecutive days before and on the morning of surgery, but do not apply any lotions, powders or deodorants after the shower on the day of surgery. Please use a fresh towels after each shower. Please sleep in clean clothes and change bed linens the night before surgery. Please do not shave for 48 hours prior to surgery. Shaving of the face is acceptable. 7. You should understand that if you do not follow these instructions your surgery may be cancelled. If your physical condition changes (I.e. fever, cold or flu) please contact your surgeon as soon as possible. 8. It is important that you be on time. If a situation occurs where you may be late, please call [phone] (OR Holding Area). 9. If you have any questions and or problems, please call [phone] (Pre-admission Testing). 10. Your surgery time may be subject to change. You will receive a phone call the evening prior if your time changes. 11.  If having outpatient surgery, you must have someone to drive you here, stay with you during the duration of your stay, and to drive you home at time of discharge. Special Instructions:Pt has copy or instruction per Dr. Davide Cosme given to patient, no vitamins or supplements or nsaids one week prior to surgery. Plans on taking Dulcolax tabs day prior to surgery     TAKE ALL BP MEDICATIONS DAY OF SURGERY       I understand a pre-operative phone call will be made to verify my surgery time. In the event that I am not available, I give permission for a message to be left on my answering service and/or with another person?   yes         ___________________      __________   _________    (Signature of Patient)             (Witness)                (Date and Time)

## 2022-05-24 ENCOUNTER — ANESTHESIA EVENT (OUTPATIENT)
Dept: SURGERY | Age: 82
DRG: 656 | End: 2022-05-24
Payer: MEDICARE

## 2022-05-24 NOTE — ANESTHESIA PREPROCEDURE EVALUATION
Relevant Problems   CARDIOVASCULAR   (+) Coronary artery disease involving native coronary artery of native heart without angina pectoris   (+) HTN (hypertension)      RENAL FAILURE   (+) Cancer of left renal pelvis (HCC)      PERSONAL HX & FAMILY HX OF CANCER   (+) Cancer of left renal pelvis (HCC)       Anesthetic History   No history of anesthetic complications  PONV          Review of Systems / Medical History  Patient summary reviewed, nursing notes reviewed and pertinent labs reviewed    Pulmonary  Within defined limits      Sleep apnea: No treatment           Neuro/Psych   Within defined limits           Cardiovascular    Hypertension: well controlled    Angina      CAD, cardiac stents, CABG and hyperlipidemia    Exercise tolerance: <4 METS  Comments: 10/2019 Stress test negative for ischemia   GI/Hepatic/Renal  Within defined limits             Comments: Diverticular disease Endo/Other  Within defined limits  Diabetes: well controlled, type 2    Arthritis     Other Findings   Comments: Idiopathic peripheral neuropathy    CT abdomen/pelvis  IMPRESSION  Left renal collecting system 1.8 cm x 1.3 cm enhancing soft tissue  mass consistent with neoplasm, specifically transitional cell carcinoma.     23x         Physical Exam    Airway  Mallampati: II  TM Distance: > 6 cm  Neck ROM: normal range of motion   Mouth opening: Normal     Cardiovascular  Regular rate and rhythm,  S1 and S2 normal,  no murmur, click, rub, or gallop  Rhythm: regular  Rate: normal         Dental    Dentition: Upper dentition intact and Lower dentition intact     Pulmonary  Breath sounds clear to auscultation               Abdominal  GI exam deferred       Other Findings            Anesthetic Plan    ASA: 3  Anesthesia type: general    Monitoring Plan: BIS and Arterial line      Induction: Intravenous  Anesthetic plan and risks discussed with: Patient      Conventional Grade 1 view for hip replacement.     No longer taking a beta blocker

## 2022-05-25 ENCOUNTER — HOSPITAL ENCOUNTER (INPATIENT)
Age: 82
LOS: 3 days | Discharge: HOME | DRG: 656 | End: 2022-05-28
Attending: UROLOGY | Admitting: UROLOGY
Payer: MEDICARE

## 2022-05-25 ENCOUNTER — ANESTHESIA (OUTPATIENT)
Dept: SURGERY | Age: 82
DRG: 656 | End: 2022-05-25
Payer: MEDICARE

## 2022-05-25 DIAGNOSIS — C65.2 CANCER OF LEFT RENAL PELVIS (HCC): Primary | ICD-10-CM

## 2022-05-25 PROCEDURE — 77030012770 HC TRCR OPT FX AMR -B: Performed by: UROLOGY

## 2022-05-25 PROCEDURE — 74011250636 HC RX REV CODE- 250/636: Performed by: ANESTHESIOLOGY

## 2022-05-25 PROCEDURE — 8E0W4CZ ROBOTIC ASSISTED PROCEDURE OF TRUNK REGION, PERCUTANEOUS ENDOSCOPIC APPROACH: ICD-10-PCS | Performed by: UROLOGY

## 2022-05-25 PROCEDURE — 88307 TISSUE EXAM BY PATHOLOGIST: CPT

## 2022-05-25 PROCEDURE — 77030020703 HC SEAL CANN DISP INTU -B: Performed by: UROLOGY

## 2022-05-25 PROCEDURE — 76010000877 HC OR TIME 2.5 TO 3HR INTENSV - TIER 2: Performed by: UROLOGY

## 2022-05-25 PROCEDURE — 77030037241 HC PRT ACC BLDLSS AIRSEAL CNMD -B: Performed by: UROLOGY

## 2022-05-25 PROCEDURE — 77030035029 HC NDL INSUF VERES DISP COVD -B: Performed by: UROLOGY

## 2022-05-25 PROCEDURE — 77030022474 HC RELD STPLR ENDO GIA COVD -C: Performed by: UROLOGY

## 2022-05-25 PROCEDURE — 77030018673: Performed by: UROLOGY

## 2022-05-25 PROCEDURE — 0TB74ZZ EXCISION OF LEFT URETER, PERCUTANEOUS ENDOSCOPIC APPROACH: ICD-10-PCS | Performed by: UROLOGY

## 2022-05-25 PROCEDURE — 76210000016 HC OR PH I REC 1 TO 1.5 HR: Performed by: UROLOGY

## 2022-05-25 PROCEDURE — 77030008771 HC TU NG SALEM SUMP -A: Performed by: ANESTHESIOLOGY

## 2022-05-25 PROCEDURE — 77030035277 HC OBTRTR BLDELSS DISP INTU -B: Performed by: UROLOGY

## 2022-05-25 PROCEDURE — 74011000250 HC RX REV CODE- 250: Performed by: ANESTHESIOLOGY

## 2022-05-25 PROCEDURE — 77030010935 HC CLP LIG ABSRB TELE -B: Performed by: UROLOGY

## 2022-05-25 PROCEDURE — 77030013079 HC BLNKT BAIR HGGR 3M -A: Performed by: ANESTHESIOLOGY

## 2022-05-25 PROCEDURE — 77030002966 HC SUT PDS J&J -A: Performed by: UROLOGY

## 2022-05-25 PROCEDURE — 77030033162 HC PCH SPEC RTVR ENDOSC AMR -B: Performed by: UROLOGY

## 2022-05-25 PROCEDURE — 74011250636 HC RX REV CODE- 250/636: Performed by: UROLOGY

## 2022-05-25 PROCEDURE — 74011250636 HC RX REV CODE- 250/636

## 2022-05-25 PROCEDURE — 77030008756 HC TU IRR SUC STRY -B: Performed by: UROLOGY

## 2022-05-25 PROCEDURE — 76060000036 HC ANESTHESIA 2.5 TO 3 HR: Performed by: UROLOGY

## 2022-05-25 PROCEDURE — 77030020061 HC IV BLD WRMR ADMIN SET 3M -B: Performed by: ANESTHESIOLOGY

## 2022-05-25 PROCEDURE — 77030016151 HC PROTCTR LNS DFOG COVD -B: Performed by: UROLOGY

## 2022-05-25 PROCEDURE — 74011000250 HC RX REV CODE- 250: Performed by: UROLOGY

## 2022-05-25 PROCEDURE — 74011000250 HC RX REV CODE- 250: Performed by: REGISTERED NURSE

## 2022-05-25 PROCEDURE — 77030002933 HC SUT MCRYL J&J -A: Performed by: UROLOGY

## 2022-05-25 PROCEDURE — 74011250637 HC RX REV CODE- 250/637: Performed by: UROLOGY

## 2022-05-25 PROCEDURE — 77030022704 HC SUT VLOC COVD -B: Performed by: UROLOGY

## 2022-05-25 PROCEDURE — 77030005513 HC CATH URETH FOL11 MDII -B: Performed by: UROLOGY

## 2022-05-25 PROCEDURE — 77030019908 HC STETH ESOPH SIMS -A: Performed by: ANESTHESIOLOGY

## 2022-05-25 PROCEDURE — 2709999900 HC NON-CHARGEABLE SUPPLY: Performed by: UROLOGY

## 2022-05-25 PROCEDURE — 77030008684 HC TU ET CUF COVD -B: Performed by: ANESTHESIOLOGY

## 2022-05-25 PROCEDURE — 65270000029 HC RM PRIVATE

## 2022-05-25 PROCEDURE — 77030026438 HC STYL ET INTUB CARD -A: Performed by: ANESTHESIOLOGY

## 2022-05-25 PROCEDURE — 74011250636 HC RX REV CODE- 250/636: Performed by: REGISTERED NURSE

## 2022-05-25 PROCEDURE — 0TT14ZZ RESECTION OF LEFT KIDNEY, PERCUTANEOUS ENDOSCOPIC APPROACH: ICD-10-PCS | Performed by: UROLOGY

## 2022-05-25 RX ORDER — ROCURONIUM BROMIDE 10 MG/ML
INJECTION, SOLUTION INTRAVENOUS AS NEEDED
Status: DISCONTINUED | OUTPATIENT
Start: 2022-05-25 | End: 2022-05-25 | Stop reason: HOSPADM

## 2022-05-25 RX ORDER — GABAPENTIN 300 MG/1
600 CAPSULE ORAL 2 TIMES DAILY
Status: DISCONTINUED | OUTPATIENT
Start: 2022-05-26 | End: 2022-05-28 | Stop reason: HOSPADM

## 2022-05-25 RX ORDER — POTASSIUM CHLORIDE 7.45 MG/ML
10 INJECTION INTRAVENOUS AS NEEDED
Status: DISCONTINUED | OUTPATIENT
Start: 2022-05-25 | End: 2022-05-28 | Stop reason: HOSPADM

## 2022-05-25 RX ORDER — SODIUM CHLORIDE 450 MG/100ML
50 INJECTION, SOLUTION INTRAVENOUS CONTINUOUS
Status: DISCONTINUED | OUTPATIENT
Start: 2022-05-25 | End: 2022-05-27

## 2022-05-25 RX ORDER — FENTANYL CITRATE 50 UG/ML
25 INJECTION, SOLUTION INTRAMUSCULAR; INTRAVENOUS
Status: DISCONTINUED | OUTPATIENT
Start: 2022-05-25 | End: 2022-05-25 | Stop reason: HOSPADM

## 2022-05-25 RX ORDER — MIDAZOLAM HYDROCHLORIDE 1 MG/ML
1 INJECTION, SOLUTION INTRAMUSCULAR; INTRAVENOUS AS NEEDED
Status: CANCELLED | OUTPATIENT
Start: 2022-05-25

## 2022-05-25 RX ORDER — OXYCODONE HYDROCHLORIDE 5 MG/1
5 TABLET ORAL
Status: DISCONTINUED | OUTPATIENT
Start: 2022-05-25 | End: 2022-05-28 | Stop reason: HOSPADM

## 2022-05-25 RX ORDER — HYDROMORPHONE HYDROCHLORIDE 2 MG/ML
INJECTION, SOLUTION INTRAMUSCULAR; INTRAVENOUS; SUBCUTANEOUS AS NEEDED
Status: DISCONTINUED | OUTPATIENT
Start: 2022-05-25 | End: 2022-05-25 | Stop reason: HOSPADM

## 2022-05-25 RX ORDER — LOSARTAN POTASSIUM 50 MG/1
50 TABLET ORAL DAILY
Status: DISCONTINUED | OUTPATIENT
Start: 2022-05-26 | End: 2022-05-28 | Stop reason: HOSPADM

## 2022-05-25 RX ORDER — SODIUM CHLORIDE, SODIUM LACTATE, POTASSIUM CHLORIDE, CALCIUM CHLORIDE 600; 310; 30; 20 MG/100ML; MG/100ML; MG/100ML; MG/100ML
INJECTION, SOLUTION INTRAVENOUS
Status: DISCONTINUED | OUTPATIENT
Start: 2022-05-25 | End: 2022-05-25 | Stop reason: HOSPADM

## 2022-05-25 RX ORDER — ONDANSETRON 2 MG/ML
4 INJECTION INTRAMUSCULAR; INTRAVENOUS
Status: DISCONTINUED | OUTPATIENT
Start: 2022-05-25 | End: 2022-05-28 | Stop reason: HOSPADM

## 2022-05-25 RX ORDER — POTASSIUM CHLORIDE 750 MG/1
40 TABLET, FILM COATED, EXTENDED RELEASE ORAL AS NEEDED
Status: DISCONTINUED | OUTPATIENT
Start: 2022-05-25 | End: 2022-05-28 | Stop reason: HOSPADM

## 2022-05-25 RX ORDER — LIDOCAINE HYDROCHLORIDE 20 MG/ML
INJECTION, SOLUTION EPIDURAL; INFILTRATION; INTRACAUDAL; PERINEURAL AS NEEDED
Status: DISCONTINUED | OUTPATIENT
Start: 2022-05-25 | End: 2022-05-25 | Stop reason: HOSPADM

## 2022-05-25 RX ORDER — ISOSORBIDE MONONITRATE 30 MG/1
60 TABLET, EXTENDED RELEASE ORAL DAILY
Status: DISCONTINUED | OUTPATIENT
Start: 2022-05-26 | End: 2022-05-28 | Stop reason: HOSPADM

## 2022-05-25 RX ORDER — HYDROMORPHONE HYDROCHLORIDE 1 MG/ML
1 INJECTION, SOLUTION INTRAMUSCULAR; INTRAVENOUS; SUBCUTANEOUS
Status: DISCONTINUED | OUTPATIENT
Start: 2022-05-25 | End: 2022-05-26

## 2022-05-25 RX ORDER — EPHEDRINE SULFATE/0.9% NACL/PF 50 MG/5 ML
SYRINGE (ML) INTRAVENOUS AS NEEDED
Status: DISCONTINUED | OUTPATIENT
Start: 2022-05-25 | End: 2022-05-25 | Stop reason: HOSPADM

## 2022-05-25 RX ORDER — HYDROMORPHONE HYDROCHLORIDE 1 MG/ML
0.5 INJECTION, SOLUTION INTRAMUSCULAR; INTRAVENOUS; SUBCUTANEOUS
Status: DISCONTINUED | OUTPATIENT
Start: 2022-05-25 | End: 2022-05-26

## 2022-05-25 RX ORDER — SODIUM CHLORIDE, SODIUM LACTATE, POTASSIUM CHLORIDE, CALCIUM CHLORIDE 600; 310; 30; 20 MG/100ML; MG/100ML; MG/100ML; MG/100ML
25 INJECTION, SOLUTION INTRAVENOUS CONTINUOUS
Status: DISCONTINUED | OUTPATIENT
Start: 2022-05-25 | End: 2022-05-25 | Stop reason: HOSPADM

## 2022-05-25 RX ORDER — ACETAMINOPHEN 325 MG/1
650 TABLET ORAL
Status: DISCONTINUED | OUTPATIENT
Start: 2022-05-25 | End: 2022-05-28 | Stop reason: HOSPADM

## 2022-05-25 RX ORDER — FAMOTIDINE 20 MG/1
20 TABLET, FILM COATED ORAL DAILY
Status: DISCONTINUED | OUTPATIENT
Start: 2022-05-26 | End: 2022-05-28 | Stop reason: HOSPADM

## 2022-05-25 RX ORDER — HYDROMORPHONE HYDROCHLORIDE 1 MG/ML
.2-.5 INJECTION, SOLUTION INTRAMUSCULAR; INTRAVENOUS; SUBCUTANEOUS
Status: DISCONTINUED | OUTPATIENT
Start: 2022-05-25 | End: 2022-05-25 | Stop reason: HOSPADM

## 2022-05-25 RX ORDER — FENTANYL CITRATE 50 UG/ML
INJECTION, SOLUTION INTRAMUSCULAR; INTRAVENOUS
Status: COMPLETED
Start: 2022-05-25 | End: 2022-05-25

## 2022-05-25 RX ORDER — FENTANYL CITRATE 50 UG/ML
INJECTION, SOLUTION INTRAMUSCULAR; INTRAVENOUS AS NEEDED
Status: DISCONTINUED | OUTPATIENT
Start: 2022-05-25 | End: 2022-05-25 | Stop reason: HOSPADM

## 2022-05-25 RX ORDER — PROPOFOL 10 MG/ML
INJECTION, EMULSION INTRAVENOUS AS NEEDED
Status: DISCONTINUED | OUTPATIENT
Start: 2022-05-25 | End: 2022-05-25 | Stop reason: HOSPADM

## 2022-05-25 RX ORDER — ONDANSETRON 4 MG/1
4 TABLET, ORALLY DISINTEGRATING ORAL
Status: DISCONTINUED | OUTPATIENT
Start: 2022-05-25 | End: 2022-05-28 | Stop reason: HOSPADM

## 2022-05-25 RX ORDER — SODIUM CHLORIDE, SODIUM LACTATE, POTASSIUM CHLORIDE, CALCIUM CHLORIDE 600; 310; 30; 20 MG/100ML; MG/100ML; MG/100ML; MG/100ML
25 INJECTION, SOLUTION INTRAVENOUS CONTINUOUS
Status: CANCELLED | OUTPATIENT
Start: 2022-05-25 | End: 2022-05-26

## 2022-05-25 RX ORDER — ONDANSETRON 2 MG/ML
INJECTION INTRAMUSCULAR; INTRAVENOUS AS NEEDED
Status: DISCONTINUED | OUTPATIENT
Start: 2022-05-25 | End: 2022-05-25 | Stop reason: HOSPADM

## 2022-05-25 RX ORDER — PHENYLEPHRINE HCL IN 0.9% NACL 0.4MG/10ML
SYRINGE (ML) INTRAVENOUS
Status: DISCONTINUED | OUTPATIENT
Start: 2022-05-25 | End: 2022-05-25 | Stop reason: HOSPADM

## 2022-05-25 RX ORDER — DEXAMETHASONE SODIUM PHOSPHATE 4 MG/ML
INJECTION, SOLUTION INTRA-ARTICULAR; INTRALESIONAL; INTRAMUSCULAR; INTRAVENOUS; SOFT TISSUE AS NEEDED
Status: DISCONTINUED | OUTPATIENT
Start: 2022-05-25 | End: 2022-05-25 | Stop reason: HOSPADM

## 2022-05-25 RX ORDER — HYDROMORPHONE HYDROCHLORIDE 1 MG/ML
INJECTION, SOLUTION INTRAMUSCULAR; INTRAVENOUS; SUBCUTANEOUS
Status: COMPLETED
Start: 2022-05-25 | End: 2022-05-25

## 2022-05-25 RX ORDER — SODIUM CHLORIDE 0.9 % (FLUSH) 0.9 %
5-40 SYRINGE (ML) INJECTION EVERY 8 HOURS
Status: DISCONTINUED | OUTPATIENT
Start: 2022-05-25 | End: 2022-05-28 | Stop reason: HOSPADM

## 2022-05-25 RX ORDER — BUPIVACAINE HYDROCHLORIDE 5 MG/ML
INJECTION, SOLUTION EPIDURAL; INTRACAUDAL AS NEEDED
Status: DISCONTINUED | OUTPATIENT
Start: 2022-05-25 | End: 2022-05-25 | Stop reason: HOSPADM

## 2022-05-25 RX ORDER — LIDOCAINE HYDROCHLORIDE 10 MG/ML
0.1 INJECTION, SOLUTION EPIDURAL; INFILTRATION; INTRACAUDAL; PERINEURAL AS NEEDED
Status: CANCELLED | OUTPATIENT
Start: 2022-05-25

## 2022-05-25 RX ORDER — SODIUM CHLORIDE 0.9 % (FLUSH) 0.9 %
5-40 SYRINGE (ML) INJECTION AS NEEDED
Status: DISCONTINUED | OUTPATIENT
Start: 2022-05-25 | End: 2022-05-28 | Stop reason: HOSPADM

## 2022-05-25 RX ORDER — GABAPENTIN 300 MG/1
1200 CAPSULE ORAL
Status: DISCONTINUED | OUTPATIENT
Start: 2022-05-25 | End: 2022-05-28 | Stop reason: HOSPADM

## 2022-05-25 RX ORDER — OXYCODONE HYDROCHLORIDE 5 MG/1
10 TABLET ORAL
Status: DISCONTINUED | OUTPATIENT
Start: 2022-05-25 | End: 2022-05-28 | Stop reason: HOSPADM

## 2022-05-25 RX ORDER — FENTANYL CITRATE 50 UG/ML
50 INJECTION, SOLUTION INTRAMUSCULAR; INTRAVENOUS AS NEEDED
Status: CANCELLED | OUTPATIENT
Start: 2022-05-25

## 2022-05-25 RX ORDER — ONDANSETRON 2 MG/ML
4 INJECTION INTRAMUSCULAR; INTRAVENOUS AS NEEDED
Status: DISCONTINUED | OUTPATIENT
Start: 2022-05-25 | End: 2022-05-25 | Stop reason: HOSPADM

## 2022-05-25 RX ORDER — AMOXICILLIN 250 MG
1 CAPSULE ORAL 2 TIMES DAILY
Status: DISCONTINUED | OUTPATIENT
Start: 2022-05-25 | End: 2022-05-28 | Stop reason: HOSPADM

## 2022-05-25 RX ORDER — ALLOPURINOL 100 MG/1
100 TABLET ORAL DAILY
Status: DISCONTINUED | OUTPATIENT
Start: 2022-05-26 | End: 2022-05-28 | Stop reason: HOSPADM

## 2022-05-25 RX ADMIN — FENTANYL CITRATE 25 MCG: 50 INJECTION, SOLUTION INTRAMUSCULAR; INTRAVENOUS at 16:20

## 2022-05-25 RX ADMIN — SUGAMMADEX 200 MG: 100 INJECTION, SOLUTION INTRAVENOUS at 15:33

## 2022-05-25 RX ADMIN — Medication 10 MG: at 13:08

## 2022-05-25 RX ADMIN — FENTANYL CITRATE 25 MCG: 50 INJECTION, SOLUTION INTRAMUSCULAR; INTRAVENOUS at 16:15

## 2022-05-25 RX ADMIN — HYDROMORPHONE HYDROCHLORIDE 0.5 MG: 1 INJECTION, SOLUTION INTRAMUSCULAR; INTRAVENOUS; SUBCUTANEOUS at 16:15

## 2022-05-25 RX ADMIN — PROPOFOL 100 MG: 10 INJECTION, EMULSION INTRAVENOUS at 13:04

## 2022-05-25 RX ADMIN — PROPOFOL 50 MG: 10 INJECTION, EMULSION INTRAVENOUS at 13:16

## 2022-05-25 RX ADMIN — HYDROMORPHONE HYDROCHLORIDE 0.2 MG: 2 INJECTION, SOLUTION INTRAMUSCULAR; INTRAVENOUS; SUBCUTANEOUS at 13:50

## 2022-05-25 RX ADMIN — SODIUM CHLORIDE 125 ML/HR: 4.5 INJECTION, SOLUTION INTRAVENOUS at 16:10

## 2022-05-25 RX ADMIN — PROPOFOL 20 MG: 10 INJECTION, EMULSION INTRAVENOUS at 13:51

## 2022-05-25 RX ADMIN — HYDROMORPHONE HYDROCHLORIDE 0.2 MG: 2 INJECTION, SOLUTION INTRAMUSCULAR; INTRAVENOUS; SUBCUTANEOUS at 14:02

## 2022-05-25 RX ADMIN — PROPOFOL 50 MCG/KG/MIN: 10 INJECTION, EMULSION INTRAVENOUS at 14:48

## 2022-05-25 RX ADMIN — WATER 2 G: 1 INJECTION INTRAMUSCULAR; INTRAVENOUS; SUBCUTANEOUS at 13:39

## 2022-05-25 RX ADMIN — OXYCODONE 5 MG: 5 TABLET ORAL at 17:00

## 2022-05-25 RX ADMIN — PROPOFOL 50 MG: 10 INJECTION, EMULSION INTRAVENOUS at 14:02

## 2022-05-25 RX ADMIN — ROCURONIUM BROMIDE 20 MG: 10 INJECTION INTRAVENOUS at 13:47

## 2022-05-25 RX ADMIN — SODIUM CHLORIDE, PRESERVATIVE FREE 10 ML: 5 INJECTION INTRAVENOUS at 21:10

## 2022-05-25 RX ADMIN — HYDROMORPHONE HYDROCHLORIDE 0.4 MG: 2 INJECTION, SOLUTION INTRAMUSCULAR; INTRAVENOUS; SUBCUTANEOUS at 14:32

## 2022-05-25 RX ADMIN — SENNOSIDES AND DOCUSATE SODIUM 1 TABLET: 50; 8.6 TABLET ORAL at 20:22

## 2022-05-25 RX ADMIN — FENTANYL CITRATE 100 MCG: 50 INJECTION, SOLUTION INTRAMUSCULAR; INTRAVENOUS at 13:04

## 2022-05-25 RX ADMIN — SODIUM CHLORIDE, POTASSIUM CHLORIDE, SODIUM LACTATE AND CALCIUM CHLORIDE: 600; 310; 30; 20 INJECTION, SOLUTION INTRAVENOUS at 12:59

## 2022-05-25 RX ADMIN — DEXAMETHASONE SODIUM PHOSPHATE 4 MG: 4 INJECTION, SOLUTION INTRAMUSCULAR; INTRAVENOUS at 13:10

## 2022-05-25 RX ADMIN — MEPERIDINE HYDROCHLORIDE 12.5 MG: 25 INJECTION INTRAMUSCULAR; INTRAVENOUS; SUBCUTANEOUS at 16:25

## 2022-05-25 RX ADMIN — OXYCODONE 10 MG: 5 TABLET ORAL at 22:49

## 2022-05-25 RX ADMIN — SODIUM CHLORIDE, SODIUM LACTATE, POTASSIUM CHLORIDE, CALCIUM CHLORIDE: 600; 310; 30; 20 INJECTION, SOLUTION INTRAVENOUS at 15:00

## 2022-05-25 RX ADMIN — LIDOCAINE HYDROCHLORIDE 80 MG: 20 INJECTION, SOLUTION EPIDURAL; INFILTRATION; INTRACAUDAL; PERINEURAL at 13:04

## 2022-05-25 RX ADMIN — Medication 30 MCG/MIN: at 13:09

## 2022-05-25 RX ADMIN — HYDROMORPHONE HYDROCHLORIDE 0.6 MG: 2 INJECTION, SOLUTION INTRAMUSCULAR; INTRAVENOUS; SUBCUTANEOUS at 13:35

## 2022-05-25 RX ADMIN — ROCURONIUM BROMIDE 60 MG: 10 INJECTION INTRAVENOUS at 13:04

## 2022-05-25 RX ADMIN — SODIUM CHLORIDE, POTASSIUM CHLORIDE, SODIUM LACTATE AND CALCIUM CHLORIDE: 600; 310; 30; 20 INJECTION, SOLUTION INTRAVENOUS at 13:31

## 2022-05-25 RX ADMIN — ONDANSETRON HYDROCHLORIDE 4 MG: 2 INJECTION, SOLUTION INTRAMUSCULAR; INTRAVENOUS at 15:20

## 2022-05-25 RX ADMIN — SODIUM CHLORIDE, POTASSIUM CHLORIDE, SODIUM LACTATE AND CALCIUM CHLORIDE: 600; 310; 30; 20 INJECTION, SOLUTION INTRAVENOUS at 15:26

## 2022-05-25 RX ADMIN — MEPERIDINE HYDROCHLORIDE 12.5 MG: 25 INJECTION INTRAMUSCULAR; INTRAVENOUS; SUBCUTANEOUS at 16:30

## 2022-05-25 RX ADMIN — HYDROMORPHONE HYDROCHLORIDE 0.2 MG: 2 INJECTION, SOLUTION INTRAMUSCULAR; INTRAVENOUS; SUBCUTANEOUS at 13:54

## 2022-05-25 RX ADMIN — FENTANYL CITRATE 25 MCG: 50 INJECTION, SOLUTION INTRAMUSCULAR; INTRAVENOUS at 16:10

## 2022-05-25 RX ADMIN — ONDANSETRON 4 MG: 2 INJECTION INTRAMUSCULAR; INTRAVENOUS at 22:49

## 2022-05-25 RX ADMIN — HYDROMORPHONE HYDROCHLORIDE 0.5 MG: 1 INJECTION, SOLUTION INTRAMUSCULAR; INTRAVENOUS; SUBCUTANEOUS at 16:30

## 2022-05-25 RX ADMIN — HYDROMORPHONE HYDROCHLORIDE 1 MG: 1 INJECTION, SOLUTION INTRAMUSCULAR; INTRAVENOUS; SUBCUTANEOUS at 20:22

## 2022-05-25 RX ADMIN — Medication 200 MCG: at 13:07

## 2022-05-25 RX ADMIN — GABAPENTIN 1200 MG: 300 CAPSULE ORAL at 21:42

## 2022-05-25 NOTE — H&P
Charu Haynes is an 80year old White male with left renal urothelial cancer. 5/5/2022: Cystoscopy with left flexible ureteroscopy, biopsy, stent followed several hours later with cystoscopy and clot evacuation for bleeders at the bladder neck, not thought to be from the renal mass biopsy. Path:  LEFT RENAL PELVIS, LOW-GRADE PAPILLARY UROTHELIAL CARCINOMA. Mr. Alexander Underwood mentions that after his catheterization he experinece an extreme amount of left lower back / flank pain upon voiding. This is improving and obviously related to his stent while voiding. He denies any recent hematuria. He has not yet restarted his baby asprin, he has a history of 2 cardiac stents with no AFIB. PAST MEDICAL HISTORY:    Allergies: No known allergies. DENIES: Latex, Shellfish, X-Ray Dye, Iodine. Medications: Azo Urinary Pain Relief 95 mg tablet (phenazopyridine) Take 2 tablet by mouth three times a day full glass of water, with or after meals as needed for urinary discomfort. This can dye your urine and tears orange-red.   cephalexin 500 mg capsule (cephalexin) Take 1 capsule by mouth three times a day   losartan 50 mg tablet (losartan) 1 tablet by mouth once a day   isosorbide mononitrate 60 mg tablet extended release 24 hr (isosorbide mononitrate) 1 tablet by mouth once a day   multivitamin tablet (multivitamin) 1 tablet by mouth once a day   allopurinol 100 mg tablet (allopurinol) 1 tablet by mouth once a day   gabapentin 600 mg tablet (gabapentin) 1 tablet by mouth three times a day 1 tab in am and afternoon and 2 tabs at night  atorvastatin 80 mg tablet (atorvastatin) 1 tablet by mouth once a day   Vitamin D3 25 mcg (1,000 unit) tablet (cholecalciferol (vitamin d3)) 1 tablet by mouth once a day     Problems: Renal pelvis cancer (ICD-189.1) (FCU44-V26.9)  Diverticulosis (ICD-562.10) (GDP75-I26.99)  Gross hematuria (ICD-599.71) (EQC45-I83.0)  Kidney stone (ICD-592.0) (HRZ71-Q89.0)  Diabetes Mellitus (ICD-250.00) (FGL89-C48.9)    Illnesses: He carries the gene for Marin syndrome. Daughter with colon cancer, Diabetes, and High Blood Pressure. DENIES: Heart Disease, Pacemaker/Defibrillator, Lung Disease, Bowel Problems, Stroke/Seizure, Kidney Problems, Bleeding Problems, HIV, Hepatitis, or Cancer. Surgeries: Joint Replacement Surgery,Open Heart Surgery,Heart Stent Procedure,Hernia Repair,Cataract Surgery, andVasectomy. Family History: DENIES: Prostate cancer, Kidney cancer, Kidney disease, Kidney stones. Social History: Retired. Other. Smoking status: never smoker. Drinks alcohol 2 to 3 times a week. System Review: Admits to: Difficulty Walking. DENIES: Unexplained Weight Loss, Dry Eyes, Dry Mouth, Leg Swelling, Shortness of Breath, Constipation, Involuntary Urine Loss, Lower Extremity Weakness, Dry Skin, Psychiatric Problems, Impaired Sex Drive, Easy Bleeding, Rash. DIAGNOSTIC STUDIES:  04/12/2022 CT ABD W/WO CONT    IMPRESSION:  Left renal collecting system 1.8 cm x 1.3 cm enhancing soft tissue  mass consistent with neoplasm, specifically transitional cell carcinoma. PSA HISTORY  3.3 ng/ml on 06/03/2021  3.3 ng/ml on 06/03/2021  2.2 ng/ml on 05/28/2019    IMPRESSION:    1. RENAL PELVIS CANCER (OSA21-Q75.9) - New: We had a complete discussion about his procedure, the clot retention complication, the path report and its discordance with what I saw. Of note, he has a solid 1.8 cm or larger cancer in left renal pelvis nonobstructing. We talked discussed and deferred Shaniqua Thomas per my recommendation. He agrees to proceed with a  robotic assisted laparoscopic left radical nephro ureterectomy under general anesthesia at the hospital.  Discussed and deferred taking at his very distal ureter with a bladder cuff per my recommendation. We discussed risk of later appearance of bladder or right ureteral cancer. Perioperative details completely discussed and all questions answered.   We did focus on postoperative renal insufficiency. Discussed and deferred stent removal at this time. 2. GROSS HEMATURIA (SBE20-B31.0) - Improved: We discussed that Mr. Tami Rausch can restart his anticoagulation with Asprin at this time. Recommended to hold asprin if his hematuria reoccurs. Discussed that he would also need to stop anticoagulation prior to additional surgery. 3. DIABETES MELLITUS (CDL85-G54.9) - Unchanged: Impact of diabetes on renal function in conjunction to surgical intervention discussed. PLAN: All questions and concerns were addressed prior to the patient leaving the clinic. The patient understands and agrees with the plan. cc: MD Bailee Verma MD Sondra Odor, MD     Upcoming Orders  Return Office Visit - with Gm Romeo MD soon after robotic assisted laparoscopic left radical nephro ureterectomy, Schedule Surgery - Schedule for ROBOTIC ASSISTED LAPAROSCOPIC LEFT RADICAL NEPHRO URETERECTOMY at [facility] under General anesthesia. Requesting Next Available (Me).   UA

## 2022-05-25 NOTE — PERIOP NOTES
TRANSFER - IN REPORT:    Verbal report received from Casie RN(name) on [facility]  being received from OR(unit) for routine post - op      Report consisted of patients Situation, Background, Assessment and   Recommendations(SBAR). Information from the following report(s) OR Summary, Intake/Output and MAR was reviewed with the receiving nurse. Opportunity for questions and clarification was provided. Assessment completed upon patients arrival to unit and care assumed.

## 2022-05-25 NOTE — ANESTHESIA PROCEDURE NOTES
Arterial Line Placement    Start time: 5/25/2022 12:40 PM  End time: 5/25/2022 12:42 PM  Performed by: Peggy Oseguera MD  Authorized by:  Peggy Oseguera MD     Pre-Procedure  Indications:  Arterial pressure monitoring  Preanesthetic Checklist: patient identified, risks and benefits discussed, anesthesia consent, site marked, patient being monitored, timeout performed and patient being monitored    Timeout Time: 12:40 EDT        Procedure:   Prep:  Chlorhexidine  Seldinger Technique?: Yes    Orientation:  Right  Location:  Radial artery  Catheter size:  20 G  Number of attempts:  1    Assessment:   Post-procedure:  Line secured and sterile dressing applied  Patient Tolerance:  Patient tolerated the procedure well with no immediate complications

## 2022-05-25 NOTE — ANESTHESIA POSTPROCEDURE EVALUATION
Procedure(s):  ROBOTIC ASSITED LAPROSCOPIC LEFT RADICAL NEPHROURETERECTOMY. general    Anesthesia Post Evaluation      Multimodal analgesia: multimodal analgesia used between 6 hours prior to anesthesia start to PACU discharge  Patient location during evaluation: PACU  Patient participation: complete - patient participated  Level of consciousness: sleepy but conscious and responsive to verbal stimuli  Pain score: 2  Pain management: adequate  Airway patency: patent  Anesthetic complications: no  Cardiovascular status: acceptable  Respiratory status: acceptable  Hydration status: acceptable  Comments: +Post-Anesthesia Evaluation and Assessment    Patient: Blayne Garcia MRN: [id number]  SSN: [id number]   YOB: 1940  Age: 80 y.o. Sex: male      Cardiovascular Function/Vital Signs    BP (!) 154/70 (BP 1 Location: Right upper arm, BP Patient Position: At rest)   Pulse 89   Temp 36.7 °C (98 °F)   Resp 18   Ht 5' 10\" (1.778 m)   Wt 85.5 kg (188 lb 7.9 oz)   SpO2 95%   BMI 27.05 kg/m²     Patient is status post Procedure(s):  ROBOTIC ASSITED LAPROSCOPIC LEFT RADICAL NEPHROURETERECTOMY. Nausea/Vomiting: Controlled. Postoperative hydration reviewed and adequate. Pain:  Pain Scale 1: Numeric (0 - 10) (05/25/22 1700)  Pain Intensity 1: 4 (05/25/22 1700)   Managed. Neurological Status:   Neuro (WDL): Exceptions to WDL (05/25/22 1700)   At baseline. Mental Status and Level of Consciousness: Arousable. Pulmonary Status:   O2 Device: Aerosol mask (05/25/22 1700)   Adequate oxygenation and airway patent. Complications related to anesthesia: None    Post-anesthesia assessment completed. No concerns.     Signed By: Ramona Naylor MD    5/25/2022  Post anesthesia nausea and vomiting:  controlled  Final Post Anesthesia Temperature Assessment:  Normothermia (36.0-37.5 degrees C)      INITIAL Post-op Vital signs:   Vitals Value Taken Time   /70 05/25/22 1700   Temp 36.7 °C (98 °F) 05/25/22 1700   Pulse 86 05/25/22 1705   Resp 15 05/25/22 1705   SpO2 96 % 05/25/22 1705   Vitals shown include unvalidated device data.

## 2022-05-25 NOTE — PROGRESS NOTES
End of Shift Note    Bedside shift change report given to Linda(oncoming nurse) by Valencia Jacome (offgoing nurse). Report included the following information SBAR, Kardex and MAR    Shift worked: [id number]     Shift summary and any significant changes:    Admission assessment completed     Concerns for physician to address:  none     Zone phone for oncsteven shift:   [phone]       Activity:  Activity Level: Up with Assistance  Number times ambulated in hallways past shift: 0  Number of times OOB to chair past shift: 0    Cardiac:   Cardiac Monitoring: No      Cardiac Rhythm: Sinus Rhythm    Access:   Current line(s): PIV     Genitourinary:   Urinary status: voiding and delarosa    Respiratory:   O2 Device: Nasal cannula  Chronic home O2 use?: NO  Incentive spirometer at bedside: YES       GI:     Current diet:  ADULT DIET Clear Liquid  Passing flatus: YES  Tolerating current diet: YES       Pain Management:   Patient states pain is manageable on current regimen: YES    Skin:  Paco Score: 21  Interventions: increase time out of bed    Patient Safety:  Fall Score:  Total Score: 1  Interventions: gripper socks       Length of Stay:  Expected LOS:   Actual LOS: 0      Valencia Jacome

## 2022-05-25 NOTE — PERIOP NOTES
TRANSFER - OUT REPORT:    Verbal report given to [facility] - FELIPA WEBB RN(name) on Monica Lilly  being transferred to [facility](unit) for routine progression of care       Report consisted of patients Situation, Background, Assessment and   Recommendations(SBAR). Information from the following report(s) OR Summary, Intake/Output and MAR was reviewed with the receiving nurse. Opportunity for questions and clarification was provided.       Patient transported with:   O2 @ 3 liters  Registered Nurse  [facility]

## 2022-05-26 LAB
ANION GAP SERPL CALC-SCNC: 5 MMOL/L (ref 5–15)
BUN SERPL-MCNC: 20 MG/DL (ref 6–20)
BUN/CREAT SERPL: 14 (ref 12–20)
CALCIUM SERPL-MCNC: 9 MG/DL (ref 8.5–10.1)
CHLORIDE SERPL-SCNC: 102 MMOL/L (ref 97–108)
CO2 SERPL-SCNC: 30 MMOL/L (ref 21–32)
CREAT SERPL-MCNC: 1.46 MG/DL (ref 0.7–1.3)
GLUCOSE SERPL-MCNC: 153 MG/DL (ref 65–100)
HCT VFR BLD AUTO: 38.9 % (ref 36.6–50.3)
HGB BLD-MCNC: 13 G/DL (ref 12.1–17)
POTASSIUM SERPL-SCNC: 4.8 MMOL/L (ref 3.5–5.1)
SODIUM SERPL-SCNC: 137 MMOL/L (ref 136–145)

## 2022-05-26 PROCEDURE — 94760 N-INVAS EAR/PLS OXIMETRY 1: CPT

## 2022-05-26 PROCEDURE — 85018 HEMOGLOBIN: CPT

## 2022-05-26 PROCEDURE — 80048 BASIC METABOLIC PNL TOTAL CA: CPT

## 2022-05-26 PROCEDURE — 74011250636 HC RX REV CODE- 250/636: Performed by: UROLOGY

## 2022-05-26 PROCEDURE — 65270000029 HC RM PRIVATE

## 2022-05-26 PROCEDURE — 74011250637 HC RX REV CODE- 250/637: Performed by: UROLOGY

## 2022-05-26 PROCEDURE — 74011000250 HC RX REV CODE- 250: Performed by: UROLOGY

## 2022-05-26 PROCEDURE — 36415 COLL VENOUS BLD VENIPUNCTURE: CPT

## 2022-05-26 PROCEDURE — 77010033678 HC OXYGEN DAILY

## 2022-05-26 RX ADMIN — SENNOSIDES AND DOCUSATE SODIUM 1 TABLET: 50; 8.6 TABLET ORAL at 10:00

## 2022-05-26 RX ADMIN — FAMOTIDINE 20 MG: 20 TABLET, FILM COATED ORAL at 10:00

## 2022-05-26 RX ADMIN — HYDROMORPHONE HYDROCHLORIDE 0.5 MG: 1 INJECTION, SOLUTION INTRAMUSCULAR; INTRAVENOUS; SUBCUTANEOUS at 01:05

## 2022-05-26 RX ADMIN — GABAPENTIN 600 MG: 300 CAPSULE ORAL at 09:59

## 2022-05-26 RX ADMIN — SODIUM CHLORIDE, PRESERVATIVE FREE 10 ML: 5 INJECTION INTRAVENOUS at 05:23

## 2022-05-26 RX ADMIN — OXYCODONE 10 MG: 5 TABLET ORAL at 06:45

## 2022-05-26 RX ADMIN — GABAPENTIN 600 MG: 300 CAPSULE ORAL at 18:23

## 2022-05-26 RX ADMIN — SODIUM CHLORIDE, PRESERVATIVE FREE 10 ML: 5 INJECTION INTRAVENOUS at 21:48

## 2022-05-26 RX ADMIN — ISOSORBIDE MONONITRATE 60 MG: 30 TABLET, EXTENDED RELEASE ORAL at 10:00

## 2022-05-26 RX ADMIN — ALLOPURINOL 100 MG: 100 TABLET ORAL at 10:00

## 2022-05-26 RX ADMIN — GABAPENTIN 1200 MG: 300 CAPSULE ORAL at 21:48

## 2022-05-26 RX ADMIN — OXYCODONE 5 MG: 5 TABLET ORAL at 19:53

## 2022-05-26 RX ADMIN — SODIUM CHLORIDE, PRESERVATIVE FREE 10 ML: 5 INJECTION INTRAVENOUS at 14:00

## 2022-05-26 RX ADMIN — SENNOSIDES AND DOCUSATE SODIUM 1 TABLET: 50; 8.6 TABLET ORAL at 21:48

## 2022-05-26 RX ADMIN — OXYCODONE 5 MG: 5 TABLET ORAL at 11:59

## 2022-05-26 RX ADMIN — LOSARTAN POTASSIUM 50 MG: 50 TABLET, FILM COATED ORAL at 10:00

## 2022-05-26 RX ADMIN — SODIUM CHLORIDE 125 ML/HR: 4.5 INJECTION, SOLUTION INTRAVENOUS at 00:38

## 2022-05-26 NOTE — PROGRESS NOTES
End of Shift Note    Bedside shift change report given to GRANT Correa (oncoming nurse) by Aviva Priest RN (offgoing nurse). Report included the following information SBAR, Kardex and MAR    Shift worked:  7PM-7AM     Shift summary and any significant changes:     Patient given prn dilaudid and oxycodone for pain during the night. Arguello cath intact and draining without any issues. Staples clean, dry and intact.      Concerns for physician to address:      Zone phone for oncoming shift:            Aviva Priest RN

## 2022-05-26 NOTE — PROGRESS NOTES
Urology Progress Note    Subjective:     Daily Progress Note: 2022 7:48 AM    Luz Bradley is 1 Day Post-Op and doing satisfactory. He reports pain is poorly controlled. Nausea with Dilaudid. He is tolerating clear liquids. Indwelling catheter is draining well. No antecedent GI sxs. Objective:     Visit Vitals  /63   Pulse 83   Temp 97.7 °F (36.5 °C)   Resp 18   Ht 5' 10\" (1.778 m)   Wt 85.5 kg (188 lb 7.9 oz)   SpO2 95%   BMI 27.05 kg/m²        Temp (24hrs), Av.8 °F (36.6 °C), Min:97.4 °F (36.3 °C), Max:98.1 °F (36.7 °C)      Intake and Output:   1901 -  0700  In: 3910.4 [P.O.:100; I.V.:3810.4]  Out: 910 [Urine:710]  No intake/output data recorded. Physical Exam:   General appearance: alert, cooperative, hurting  Abdomen: normal findings: post op  Extremities: Normal    Incision: clean, dry, no drainage and bruised    Lab/Data Review: All lab results for the last 24 hours reviewed. Assessment/Plan:     Active Problems:    Cancer of left renal pelvis ([address] (2022)        Status Post:  Procedure(s):  ROBOTIC ASSITED LAPROSCOPIC LEFT RADICAL NEPHROURETERECTOMY     Plan:  Advance diet,  Pepcid, decrease IVF, DC Arguello after OOB. P pain meds with change to Demerol backup. IS.  AM labs. Probable discharge home Friday afternoon.

## 2022-05-26 NOTE — OP NOTES
[address]  OPERATIVE REPORT    Name:  Yue Allison  MR#:  [id number]  :  1940  ACCOUNT #:  [de-identified]  DATE OF SERVICE:  2022    PREOPERATIVE DIAGNOSIS:  Left renal pelvis cancer. POSTOPERATIVE DIAGNOSIS:  Left renal pelvis cancer. PROCEDURE PERFORMED:  Robot-assisted laparoscopic left nephroureterectomy. SURGEON:  Juma Paulino MD    ASSISTANT:  Dr. Ona Nyhan. ANESTHESIA:  General endotracheal plus local.    COMPLICATIONS:  none. SPECIMENS REMOVED:  Left kidney and ureter. IMPLANTS:  Arguello. ESTIMATED BLOOD LOSS:  200 mL. INDICATIONS:  An 55-year-old white male with a 1.8 cm left renal pelvis cancer who underwent biopsy demonstrating a low-grade papillary urothelial carcinoma, although its appearance was consistent with high grade either way was unresectable. He had that diagnosis made and had ureterostomy stent placement on 2022 and now comes in for completion nephroureterectomy after complete counseling. He has no evidence of any adenopathy or other regional metastatic spread on his 2022 CT. His renal function was normal with a GFR greater than 60, creatinine 1.12 two months ago. He was fully counseled and prepared per protocol including IV antibiotics. PROCEDURE IN DETAIL:  He underwent a general endotracheal anesthetic and had a Arguello catheter and an orogastric tube placed. The Anesthesia Department did notice some gastric bleeding of questionable origin and requested a postoperative PPI. He was then placed in the left flank up semi-flank position with an axillary roll in place and the table flexed. On the beanbag, he was padded and secured in the usual fashion. His hair had been clipped. He was now prepped and draped in a sterile fashion and time-out was called confirming the planned procedure. A 0.5% Marcaine was used on all incision points.   A left lower quadrant incision was made with a #15 blade scalpel and a Veress needle was passed without difficulty. The drop test and opening pressures of CO2 were normal.  CO2 was used to create pneumoperitoneum at 15 atmospheres. Then the 0-degree lens through the 12 mm AirSeal VisiPort through a left periumbilical incision was then used to enter the abdomen safely. The remainder of the robotic assisted ports were placed in the usual position under direct vision. The left abdomen was scanned and there were no adverse findings. Some mild left upper quadrant adhesions were taken down directly. The left colon was then reflected by incising along the white line of Toldt and reflecting it medially until the left retroperitoneum was entered and the psoas muscle seen. The gonadal vein was quickly identified, then was the ureter containing the stent. It was elevated with the fourth arm and the dissection was then taken towards the kidney. The colon was reflected off of the kidney mostly above Gerota's some closer to the hilum within Gerota's. In this way, a large left renal vein was identified. There was a small left lower pole artery and vein combination which was treated with a clip as was the gonadal since after some dissection around the left renal vein demonstrated early branching and a vertebral vein as well. The remaining left renal artery was found crossing diagonally from underneath the vein to the cranial aspect of the vein. While doing this dissection above the vein, there was some venous bleeding encountered thought to be from the adrenal.  The corner of the adrenal gland was seen, but the venous bleeding was slow but persistent. Therefore, having cleared off the posterior surface of the kidney and seeing the sulcus between the adrenal and the left upper pole renal parenchyma, I went ahead and used a 45 mm articulating Endo DASHA stapler across the hilum successfully controlling the bleeding and taking the hilum at the same time.   The dissection then continued in the upper pole of the kidney where the fat between the adrenal and the upper pole of the kidney was taken with another bite of the Endo DASHA stapler. The remainder of the attachments were taken with scissors both cold and bipolar cautery. The spleen had been released and was uninjured. The pancreas had been reflected upwards. The remainder of the upper pole then the lateral tissues were taken. Attention was then turned to the ureter where the camera ports were adjusted then the ureter was dissected out all the way down into the left hemipelvis at the level of the bladder. At this level, a 2-0 V-Loc 6 inch suture was used for traction. Then I incised the ureter. The stent was visualized and pulled through. Only a small amount of it was beyond the incised ureter indicating ureter to be taken very low next to the bladder. A Hem-o-benito clip was placed across this to prevent any downward spillage of cells. The remainder of the ureter was then transected and the ureteral stump at the bladder was oversewn with the 2-0 V-Loc suture and a Hem-o-benito was placed there for that suture and for later identification if necessary. There was no evidence of any urine efflux. The 12 mm AirSeal port was then placed with a 15 mm EndoCatch bag trocar and the specimen was placed in the drawstring bag. The hilum was then examined and some old blood fluid and clot were irrigated out. There was no evidence of active bleeding. No tissue sealants were necessary. There was no evidence of any injury to the bowel, pancreas or spleen. The robot was then undocked and the pneumoperitoneum released. The #15 blade scalpel was used to then incise the 7 cm gap between the middle to lateral robotic ports for an extraction site. The muscle bellies were split crossing artery-vein combination which was probably a branch of the epigastric was encountered and fulgurated.   The abdomen was entered and the bagged specimen was removed and sent for permanent pathologic examination. This incision was then closed with a running looped 0 PDS suture. The subcutaneous tissues were irrigated and the skin was closed with staples at all sites. This concluded the procedure which he tolerated well. Estimated blood loss 200 mL. Fluids given 2300 mL of crystalloid. No blood or blood products. Urine output 100 mL. Needle, sponge, and instrument counts were correct. Further local 0.5% Marcaine was used at the extraction site completing the procedure. He was stable on my departure from the operative suite. Operative findings described to his wife.       MD JAMIE [id number]  D:  05/25/2022 15:49  T:  05/26/2022 0:54  JOB #:  [id number]

## 2022-05-26 NOTE — PROGRESS NOTES
End of Shift Note    Bedside shift change report given to Mike (oncoming nurse) by Natacha Andrade (offgoing nurse). Report included the following information SBAR, Kardex and MAR    Shift worked:  9099-8982     Shift summary and any significant changes:     pt voiding no problem, tolerating full liquid diet, up to chair >5 hours. Educated on splinting, pt now deep breathing and coughing     Concerns for physician to address:  none     Zone phone for oncoming shift:   [phone]       Activity:  Activity Level: Up with Assistance  Number times ambulated in hallways past shift: 0  Number of times OOB 3  Cardiac:   Cardiac Monitoring: No      Cardiac Rhythm: Sinus Rhythm    Access:   Current line(s): PIV     Genitourinary:   Urinary status: voiding    Respiratory:   O2 Device: Nasal cannula  Chronic home O2 use?: NO  Incentive spirometer at bedside: YES       GI:     Current diet:  ADULT DIET Full Liquid  Passing flatus: YES  Tolerating current diet: YES       Pain Management:   Patient states pain is manageable on current regimen: YES    Skin:  Paco Score: 21  Interventions: increase time out of bed    Patient Safety:  Fall Score:  Total Score: 2  Interventions: gripper socks and pt to call before getting OOB       Length of Stay:  Expected LOS: 3d 4h  Actual LOS: [address]

## 2022-05-26 NOTE — PROGRESS NOTES
Problem: Falls - Risk of  Goal: *Absence of Falls  Description: Document Anatoliy Daniel Fall Risk and appropriate interventions in the flowsheet.   Outcome: Progressing Towards Goal  Note: Fall Risk Interventions:            Medication Interventions: Teach patient to arise slowly,Patient to call before getting OOB    Elimination Interventions: Call light in reach              Problem: Patient Education: Go to Patient Education Activity  Goal: Patient/Family Education  Outcome: Progressing Towards Goal

## 2022-05-27 ENCOUNTER — APPOINTMENT (OUTPATIENT)
Dept: GENERAL RADIOLOGY | Age: 82
DRG: 656 | End: 2022-05-27
Attending: NURSE PRACTITIONER
Payer: MEDICARE

## 2022-05-27 ENCOUNTER — APPOINTMENT (OUTPATIENT)
Dept: NUCLEAR MEDICINE | Age: 82
DRG: 656 | End: 2022-05-27
Attending: INTERNAL MEDICINE
Payer: MEDICARE

## 2022-05-27 PROBLEM — I26.99 PULMONARY EMBOLISM (HCC): Status: ACTIVE | Noted: 2022-05-27

## 2022-05-27 PROBLEM — E11.42 DIABETIC PERIPHERAL NEUROPATHY ASSOCIATED WITH TYPE 2 DIABETES MELLITUS (HCC): Chronic | Status: ACTIVE | Noted: 2021-12-14

## 2022-05-27 PROBLEM — R06.02 SOB (SHORTNESS OF BREATH): Status: ACTIVE | Noted: 2022-05-27

## 2022-05-27 PROBLEM — I10 HTN (HYPERTENSION): Chronic | Status: ACTIVE | Noted: 2019-05-28

## 2022-05-27 LAB
ANION GAP SERPL CALC-SCNC: 1 MMOL/L (ref 5–15)
APTT PPP: 33.9 SEC (ref 22.1–31)
APTT PPP: 85.3 SEC (ref 22.1–31)
BASOPHILS # BLD: 0 K/UL (ref 0–0.1)
BASOPHILS NFR BLD: 0 % (ref 0–1)
BUN SERPL-MCNC: 20 MG/DL (ref 6–20)
BUN/CREAT SERPL: 13 (ref 12–20)
CALCIUM SERPL-MCNC: 9.1 MG/DL (ref 8.5–10.1)
CHLORIDE SERPL-SCNC: 102 MMOL/L (ref 97–108)
CO2 SERPL-SCNC: 31 MMOL/L (ref 21–32)
CREAT SERPL-MCNC: 1.53 MG/DL (ref 0.7–1.3)
D DIMER PPP FEU-MCNC: 3.98 MG/L FEU (ref 0–0.65)
DIFFERENTIAL METHOD BLD: ABNORMAL
EOSINOPHIL # BLD: 0 K/UL (ref 0–0.4)
EOSINOPHIL NFR BLD: 0 % (ref 0–7)
ERYTHROCYTE [DISTWIDTH] IN BLOOD BY AUTOMATED COUNT: 13.7 % (ref 11.5–14.5)
GLUCOSE SERPL-MCNC: 133 MG/DL (ref 65–100)
HCT VFR BLD AUTO: 39.1 % (ref 36.6–50.3)
HCT VFR BLD AUTO: 39.6 % (ref 36.6–50.3)
HGB BLD-MCNC: 13.1 G/DL (ref 12.1–17)
HGB BLD-MCNC: 13.1 G/DL (ref 12.1–17)
IMM GRANULOCYTES # BLD AUTO: 0 K/UL (ref 0–0.04)
IMM GRANULOCYTES NFR BLD AUTO: 0 % (ref 0–0.5)
LYMPHOCYTES # BLD: 1.6 K/UL (ref 0.8–3.5)
LYMPHOCYTES NFR BLD: 14 % (ref 12–49)
MCH RBC QN AUTO: 30.4 PG (ref 26–34)
MCHC RBC AUTO-ENTMCNC: 33.1 G/DL (ref 30–36.5)
MCV RBC AUTO: 91.9 FL (ref 80–99)
MONOCYTES # BLD: 1.3 K/UL (ref 0–1)
MONOCYTES NFR BLD: 11 % (ref 5–13)
NEUTS SEG # BLD: 8.4 K/UL (ref 1.8–8)
NEUTS SEG NFR BLD: 75 % (ref 32–75)
NRBC # BLD: 0 K/UL (ref 0–0.01)
NRBC BLD-RTO: 0 PER 100 WBC
PLATELET # BLD AUTO: 186 K/UL (ref 150–400)
PMV BLD AUTO: 9.7 FL (ref 8.9–12.9)
POTASSIUM SERPL-SCNC: 4.6 MMOL/L (ref 3.5–5.1)
RBC # BLD AUTO: 4.31 M/UL (ref 4.1–5.7)
SODIUM SERPL-SCNC: 134 MMOL/L (ref 136–145)
THERAPEUTIC RANGE,PTTT: ABNORMAL SECS (ref 58–77)
THERAPEUTIC RANGE,PTTT: ABNORMAL SECS (ref 58–77)
WBC # BLD AUTO: 11.4 K/UL (ref 4.1–11.1)

## 2022-05-27 PROCEDURE — 80048 BASIC METABOLIC PNL TOTAL CA: CPT

## 2022-05-27 PROCEDURE — 74011250636 HC RX REV CODE- 250/636: Performed by: INTERNAL MEDICINE

## 2022-05-27 PROCEDURE — 85379 FIBRIN DEGRADATION QUANT: CPT

## 2022-05-27 PROCEDURE — 74011250637 HC RX REV CODE- 250/637: Performed by: UROLOGY

## 2022-05-27 PROCEDURE — 85730 THROMBOPLASTIN TIME PARTIAL: CPT

## 2022-05-27 PROCEDURE — 94760 N-INVAS EAR/PLS OXIMETRY 1: CPT

## 2022-05-27 PROCEDURE — 65270000029 HC RM PRIVATE

## 2022-05-27 PROCEDURE — 74011250637 HC RX REV CODE- 250/637: Performed by: INTERNAL MEDICINE

## 2022-05-27 PROCEDURE — 85025 COMPLETE CBC W/AUTO DIFF WBC: CPT

## 2022-05-27 PROCEDURE — 36415 COLL VENOUS BLD VENIPUNCTURE: CPT

## 2022-05-27 PROCEDURE — 74011000250 HC RX REV CODE- 250: Performed by: UROLOGY

## 2022-05-27 PROCEDURE — 85018 HEMOGLOBIN: CPT

## 2022-05-27 PROCEDURE — A9540 TC99M MAA: HCPCS

## 2022-05-27 PROCEDURE — 71045 X-RAY EXAM CHEST 1 VIEW: CPT

## 2022-05-27 RX ORDER — SODIUM CHLORIDE 9 MG/ML
100 INJECTION, SOLUTION INTRAVENOUS CONTINUOUS
Status: DISCONTINUED | OUTPATIENT
Start: 2022-05-27 | End: 2022-05-28

## 2022-05-27 RX ORDER — GUAIFENESIN 600 MG/1
600 TABLET, EXTENDED RELEASE ORAL EVERY 12 HOURS
Status: DISCONTINUED | OUTPATIENT
Start: 2022-05-27 | End: 2022-05-28 | Stop reason: HOSPADM

## 2022-05-27 RX ORDER — HEPARIN SODIUM 10000 [USP'U]/100ML
18-36 INJECTION, SOLUTION INTRAVENOUS
Status: DISCONTINUED | OUTPATIENT
Start: 2022-05-27 | End: 2022-05-28

## 2022-05-27 RX ORDER — HEPARIN SODIUM 1000 [USP'U]/ML
80 INJECTION, SOLUTION INTRAVENOUS; SUBCUTANEOUS ONCE
Status: COMPLETED | OUTPATIENT
Start: 2022-05-27 | End: 2022-05-27

## 2022-05-27 RX ORDER — HEPARIN SODIUM 1000 [USP'U]/ML
80 INJECTION, SOLUTION INTRAVENOUS; SUBCUTANEOUS AS NEEDED
Status: DISCONTINUED | OUTPATIENT
Start: 2022-05-27 | End: 2022-05-28

## 2022-05-27 RX ORDER — OXYCODONE HYDROCHLORIDE 5 MG/1
5 TABLET ORAL
Qty: 12 TABLET | Refills: 0 | Status: SHIPPED | OUTPATIENT
Start: 2022-05-27 | End: 2022-05-30

## 2022-05-27 RX ORDER — HEPARIN SODIUM 1000 [USP'U]/ML
40 INJECTION, SOLUTION INTRAVENOUS; SUBCUTANEOUS AS NEEDED
Status: DISCONTINUED | OUTPATIENT
Start: 2022-05-27 | End: 2022-05-28

## 2022-05-27 RX ADMIN — GABAPENTIN 600 MG: 300 CAPSULE ORAL at 08:40

## 2022-05-27 RX ADMIN — SODIUM CHLORIDE, PRESERVATIVE FREE 5 ML: 5 INJECTION INTRAVENOUS at 14:00

## 2022-05-27 RX ADMIN — SENNOSIDES AND DOCUSATE SODIUM 1 TABLET: 50; 8.6 TABLET ORAL at 08:40

## 2022-05-27 RX ADMIN — SENNOSIDES AND DOCUSATE SODIUM 1 TABLET: 50; 8.6 TABLET ORAL at 21:21

## 2022-05-27 RX ADMIN — SODIUM CHLORIDE, PRESERVATIVE FREE 10 ML: 5 INJECTION INTRAVENOUS at 06:00

## 2022-05-27 RX ADMIN — ISOSORBIDE MONONITRATE 60 MG: 30 TABLET, EXTENDED RELEASE ORAL at 08:40

## 2022-05-27 RX ADMIN — SODIUM CHLORIDE 50 ML/HR: 4.5 INJECTION, SOLUTION INTRAVENOUS at 06:00

## 2022-05-27 RX ADMIN — SODIUM CHLORIDE 100 ML/HR: 9 INJECTION, SOLUTION INTRAVENOUS at 15:06

## 2022-05-27 RX ADMIN — HEPARIN SODIUM 6840 UNITS: 1000 INJECTION INTRAVENOUS; SUBCUTANEOUS at 15:49

## 2022-05-27 RX ADMIN — GUAIFENESIN 600 MG: 600 TABLET, EXTENDED RELEASE ORAL at 21:18

## 2022-05-27 RX ADMIN — HEPARIN SODIUM 16 UNITS/KG/HR: 10000 INJECTION, SOLUTION INTRAVENOUS at 22:58

## 2022-05-27 RX ADMIN — GUAIFENESIN 600 MG: 600 TABLET, EXTENDED RELEASE ORAL at 15:05

## 2022-05-27 RX ADMIN — SODIUM CHLORIDE, PRESERVATIVE FREE 10 ML: 5 INJECTION INTRAVENOUS at 21:32

## 2022-05-27 RX ADMIN — FAMOTIDINE 20 MG: 20 TABLET, FILM COATED ORAL at 08:40

## 2022-05-27 RX ADMIN — GABAPENTIN 600 MG: 300 CAPSULE ORAL at 17:44

## 2022-05-27 RX ADMIN — HEPARIN SODIUM 18 UNITS/KG/HR: 10000 INJECTION, SOLUTION INTRAVENOUS at 15:49

## 2022-05-27 RX ADMIN — SODIUM CHLORIDE 100 ML/HR: 9 INJECTION, SOLUTION INTRAVENOUS at 21:23

## 2022-05-27 RX ADMIN — ALLOPURINOL 100 MG: 100 TABLET ORAL at 08:40

## 2022-05-27 RX ADMIN — LOSARTAN POTASSIUM 50 MG: 50 TABLET, FILM COATED ORAL at 08:40

## 2022-05-27 RX ADMIN — GABAPENTIN 1200 MG: 300 CAPSULE ORAL at 21:17

## 2022-05-27 NOTE — PROGRESS NOTES
1776RM nurse checked patient's vital signs and noted oxygen level at 85% on room air. Nurse educated patient on taking deep breaths and using incentive spirometry. Patient placed on 2 liters of oxygen via nasal cannula. o2 sats came up to 92%. End of Shift Note    Bedside shift change report given to GRANT Grant (oncoming nurse) by Russ Peacock RN (offgoing nurse). Report included the following information SBAR, Kardex and MAR    Shift worked:  7PM-7AM     Shift summary and any significant changes:     Patient given prn oxycodone for pain management. Ambulated the hallaway x1 this shift. Voiding without any issues. Some sob noted with activity. Patient put on 2 liters of oxygen via nasal cannula.      Concerns for physician to address:      Zone phone for oncoming shift:            Russ Peacock RN

## 2022-05-27 NOTE — PROGRESS NOTES
Ambulating Test/O2 Challenge:     92% at rest on RA  93% with activity on RA. Pt states that the oxygen does not make him feel any different and he always has congestion in the morning. 18 - Notified Dr. Carmel Mireles of elevated D-Dimer 3.98.   1436 - Notified Dr. Carmel Mireles of VQ scan results. He asked for this nurse to notify attending physician of VQ scan results and inquire if aspirin or anticoagulant should be ordered. Sent a message to [facility] NP. Waiting for response. [address] with [facility] NP; she is to call Dr. Carmel Mireles to discuss plan of care.

## 2022-05-27 NOTE — PROGRESS NOTES
Spiritual Care Partner Volunteer visited patient at [address] in [facility] 3 SURG TELE on 5/27/2022   Documented by: Ariana Riddle.    Paging Service: [phone])

## 2022-05-27 NOTE — CONSULTS
Hospitalist Consultation Note    NAME:  Edward Horton   :   1940   MRN:   [id number]     ATTENDING: Delores Balderas MD  PCP:  David Hutton MD    Date/Time:  2022 11:43 AM      Recommendations/Plan:       Active Problems:    HTN (hypertension) (2019)      Diabetic peripheral neuropathy associated with type 2 diabetes mellitus ([address] (2021)      Cancer of left renal pelvis ([address] (2022)      SOB (shortness of breath) (2022)      Pulmonary embolism ([address] (2022)      Post Op Transient Hypoxic resp failure ()- now resolved,  pt now off oxygen when seen this afternoon but still significantly SOB with cough/congestion  R/o Pulm embolism (post op)  H/o BORIS not on CPAP-- sleep study was>20yrs ago and pt says it was not confirmatory, since then has lost significant weight   CXR= Shallow inspiration with left basilar atelectasis or small  Infiltrate  D Dimer= 3.98    Cont incentive spirometer post op every 2 hrs as ordered  Check V/Q scan to rule out PE (Cr elevated at 1.5)  Pt was on SCDs perioperatively, consider IV heparin if V/Q scan comes back high probability for PE  Gentle IVF - change to NS 100ml/hr for now  BMP in AM  If pt remains off oxygen at rest and ambulation and V/Q scan comes back low probability-- pt can be discharged home with Outpatient Pulm followup for considering OP sleep study when recovered from current urologically stable. Will add Mucinex BID to help with hypersecretions- mucolysis    HTN  DM with neuropathy  L renal cancer s/p radical Nephroureterectomy     Cont home meds- hold losartan for now  Pain management          Code Status: Full code  DVT Prophylaxis: SCDs          Subjective:   REQUESTING PHYSICIAN:  Dr Michelle Mcqueen (urology)  REASON FOR CONSULT:  SOB post op with transient Hypoxia  Edmund Ferreira is a 80 y.o.  male who I was asked to see for hypoxic resp failure post op with SOB today when pt was being prepared to be discharged.   Pt has remote testing for BORIS 20+ yr ago- was not a positive study per pt and no CPAP was recommended  Pt doesn't smoke  Pt does drink alcohol 4 drinks/week  Per wife. Pt denies any previous h/o DVT/PE              Past Medical History:   Diagnosis Date    Adverse effect of anesthesia     hallucinations/agitation after last surgery 2018 in hospital 3 days    Arthritis     knee and hip/right    CAD (coronary artery disease) 2001    Chronic kidney disease     left kidney tumor    Hypercholesteremia     Hypertension     Nausea & vomiting     Sleep apnea     Borderline/ No CPAP      Past Surgical History:   Procedure Laterality Date    COLONOSCOPY N/A 6/11/2020    COL[facility]  6/11/2020         HX APPENDECTOMY      HX CORONARY ARTERY BYPASS GRAFT  2001    5v    HX GI      umbilical hernia x3    HX HIP REPLACEMENT Left 2017    HX KNEE ARTHROSCOPY Right 2007    right knee    MT CARDIAC SURG PROCEDURE UNLIST  03/2001    Bypass    MT CARDIAC SURG PROCEDURE UNLIST  2008    stents    MT TOTAL KNEE ARTHROPLASTY Left 2017    left hip replacement    UPPER GI ENDOSCOPY,BIOPSY  6/11/2020          Social History     Tobacco Use    Smoking status: Never Smoker    Smokeless tobacco: Never Used   Substance Use Topics    Alcohol use: Yes     Alcohol/week: 3.0 standard drinks     Types: 3 Cans of beer per week     Comment: Daily 1-2 drinks      Family History   Problem Relation Age of Onset    Cancer Mother         LUNG    Diabetes Mother     COPD Mother     No Known Problems Father     Diabetes Maternal Grandmother     Other Daughter         [facility]       No Known Allergies   Prior to Admission medications    Medication Sig Start Date End Date Taking? Authorizing Provider   gabapentin (Neurontin) 600 mg tablet 1 po bid AND 2 po qhs  Patient taking differently: 600 mg two (2) times a day.  1 po bid AND 2 po qhs 2/21/22  Yes Roslyn Ac MD allopurinoL (ZYLOPRIM) 100 mg tablet Take 1 tablet by mouth daily  Patient taking differently: Take 100 mg by mouth daily. Take 1 tablet by mouth daily 2/11/22  Yes Shweta Flanagan MD   atorvastatin (LIPITOR) 80 mg tablet Take 1 tablet by mouth daily 2/11/22  Yes Shweta Flanagan MD   isosorbide mononitrate ER (IMDUR) 60 mg CR tablet Take 1 Tablet by mouth daily. 2/11/22  Yes Shweta Flanagan MD   losartan (COZAAR) 50 mg tablet Take 1 Tablet by mouth daily. 2/11/22  Yes Shweta Flanagan MD   cholecalciferol, vitamin D3, (Vitamin D3) 50 mcg (2,000 unit) tab Take 2,000 Units by mouth. Provider, Historical   b complex vitamins tablet Take 1 Tablet by mouth daily. Provider, Historical   multivitamin (ONE A DAY) tablet Take 1 Tab by mouth daily. Provider, Historical   omega 3-dha-epa-fish oil (FISH OIL) 100-160-1,000 mg cap Take  by mouth two (2) times a day.  2 tabs bid    Provider, Historical       REVIEW OF SYSTEMS:     Total of 12 systems reviewed as follows:        POSITIVE= underlined text  Negative = text not underlined  General:  fever, chills, sweats, generalized weakness, weight loss/gain,      loss of appetite   Eyes:    blurred vision, eye pain, loss of vision, double vision  ENT:    rhinorrhea, pharyngitis   Respiratory:   cough, sputum production, SOB, wheezing, CHÁVEZ, pleuritic pain   Cardiology:   chest pain, palpitations, orthopnea, PND, edema, syncope   Gastrointestinal:  abdominal pain , N/V, dysphagia, diarrhea, constipation, bleeding   Genitourinary:  frequency, urgency, dysuria, hematuria, incontinence   Muskuloskeletal :  arthralgia, myalgia   Hematology:  easy bruising, nose or gum bleeding, lymphadenopathy   Dermatological: rash, ulceration, pruritis   Endocrine:   hot flashes or polydipsia   Neurological:  headache, dizziness, confusion, focal weakness, paresthesia,     Speech difficulties, memory loss, gait disturbance  Psychological: Feelings of anxiety, depression, agitation    Objective: VITALS:    Visit Vitals  /77   Pulse 85   Temp 97.6 °F (36.4 °C)   Resp 17   Ht 5' 10\" (1.778 m)   Wt 85.5 kg (188 lb 7.9 oz)   SpO2 93%   BMI 27.05 kg/m²     Temp (24hrs), Av.9 °F (36.6 °C), Min:97.4 °F (36.3 °C), Max:98.6 °F (37 °C)      PHYSICAL EXAM:   General:    Alert, cooperative, no distress, appears stated age. Obese+    HEENT: Atraumatic, anicteric sclerae, pink conjunctivae     No oral ulcers, mucosa moist, throat clear  Neck:  Supple, symmetrical,  thyroid: non tender  Lungs:   Distant BS +, no obvious crackles or wheezing  Chest wall:  No tenderness  No Accessory muscle use. Heart:   Regular  rhythm,  No  murmur   No edema  Abdomen:   Soft, non-tender. Not distended. Bowel sounds normal  Extremities: No cyanosis. No clubbing  Skin:     Not pale. Not Jaundiced  No rashes   Psych:  Good insight. Not depressed. Not anxious or agitated. Neurologic: EOMs intact. No facial asymmetry. No aphasia or slurred speech. Symmetrical strength, Alert and oriented X 4.     _______________________________________________________________________  Care Plan discussed with:    Comments   Patient x    Family  x Wife at bedside in 5   RN x    Care Manager                    Consultant:      ____________________________________________________________________  TOTAL TIME:     42 mins    Comments    x Reviewed previous records   >50% of visit spent in counseling and coordination of care x Discussion with patient and family and questions answered       Critical Care Provided     Minutes non procedure based  ________________________________________________________________________  Signed: Shashank Campo MD      Procedures: see electronic medical records for all procedures/Xrays and details which were not copied into this note but were reviewed prior to creation of Plan.     LAB DATA REVIEWED:    Recent Results (from the past 24 hour(s))   METABOLIC PANEL, BASIC    Collection Time: 22  3:03 AM Result Value Ref Range    Sodium 134 (L) 136 - 145 mmol/L    Potassium 4.6 3.5 - 5.1 mmol/L    Chloride 102 97 - 108 mmol/L    CO2 31 21 - 32 mmol/L    Anion gap 1 (L) 5 - 15 mmol/L    Glucose 133 (H) 65 - 100 mg/dL    BUN 20 6 - 20 MG/DL    Creatinine 1.53 (H) 0.70 - 1.30 MG/DL    BUN/Creatinine ratio 13 12 - 20      GFR est AA 53 (L) >60 ml/min/1.73m2    GFR est non-AA 44 (L) >60 ml/min/1.73m2    Calcium 9.1 8.5 - 10.1 MG/DL   HGB & HCT    Collection Time: 05/27/22  3:03 AM   Result Value Ref Range    HGB 13.1 12.1 - 17.0 g/dL    HCT 39.1 36.6 - 50.3 %       _____________________________  Hospitalist: Noa Carballo MD

## 2022-05-27 NOTE — PROGRESS NOTES
Patient is ready for d/c from CM standpoint    Transition of Care Plan:    RUR:6%  Disposition:Home w/family  Follow up appointments:on AVS  DME needed:none  Transportation at [address] or means to access home:        IM Medicare Letter:IM provided 5/25  Is patient a BCPI-A Bundle:   n/a        If yes, was Bundle Letter given?:    Is patient a [place] and connected with the [place]? If yes, was [place] transfer form completed and [facility] notified? Caregiver Contact:wifeRebecca [phone]   Discharge Caregiver contacted prior to discharge? Care Conference needed?:      Patient is expected to d/c home today without any needs or concerns. Reason for Admission:  Malignant neoplasm of renal pelvis                   RUR Score:     6%                Plan for utilizing home health:  Not anticipated       PCP: First and Last name:  Jennifer Aragon MD     Name of Practice:    Are you a current patient: Yes/No: yes   Approximate date of last visit: Dec 2021   Can you participate in a virtual visit with your PCP:                     Current Advanced Directive/Advance Care Plan: Full [place] (ACP) Conversation      Date of Conversation: 5/27/22  Conducted with: wife    Healthcare Decision Maker:     Primary Decision Maker (Active): Titaserinakailee - Spouse - [phone]    Secondary Decision Maker: Maxx Michaudbrendon - Daughter-in-Law - [phone]  Click here to [address] including selection of the Healthcare Decision Maker Relationship (ie \"Primary\")      Content/Action Overview:    Has ACP document(s) NOT on file - requested patient to provide     Length of Voluntary ACP Conversation in minutes:  <16 minutes (Non-Billable)    [facility] Decision Maker:   Click here to complete [address] including selection of the Healthcare Decision Maker Relationship (ie \"Primary\") Primary Decision Maker (Active): Pedro - Spouse - [phone]    Secondary Decision Maker: Rell Rowe - Daughter-in-Law - [phone]                  Transition of Care Plan:   CM completed assessment w/pt's wife, while pt was off the floor. Prior to admission, pt was independent w/ADL/IADL to include driving. Patient has had [facility] in the past, but unable to remember name of provider. No history of Rehab or DME use. Patients support system includes, wife, daughter & in-laws. No needs or concerns identified. PCP-    Caitlin Brunner MD  Pharmacy- [facility]  PCP Verified by CM: Yes  Mode of Transport at Discharge:  Other (see comment) (wife)  Transition of Care Consult (CM Consult): Discharge Planning  Discharge Durable Medical Equipment: No (no DME use)  Physical Therapy Consult: No  Occupational Therapy Consult: No  Speech Therapy Consult: No  Support Systems: Spouse/Significant Other,Other Family Member(s),Child(batsheva) (Lives in a one story home w/no ESTUARDO)  Confirm Follow Up Transport: Self  Discharge Location  Patient Expects to be Discharged to[de-identified] Home with family assistance      [place]  Ext [phone]

## 2022-05-27 NOTE — DISCHARGE SUMMARY
Urology Discharge Summary    Patient: Damon Park MRN: [id number]  SSN: [id number]    YOB: 1940  Age: 80 y.o. Sex: male               ADMISSION:  to Justine Leslie MD by Maggi Kerr MD  2022 ADMISSION DIAGNOSIS: Malignant neoplasm of renal pelvis, unspecified laterality ([facility] [C65.9]  Cancer of left renal pelvis ([facility] [C65.2]  2022 DISCHARGE DIAGNOSIS: [x]    Same  CONSULTS: Internal Medicine  PROCEDURES: POD# 2 Days Post-Op Procedure(s):  ROBOTIC ASSITED LAPROSCOPIC LEFT RADICAL NEPHROURETERECTOMY    RECENT LABS: Temp (24hrs), Av.9 °F (36.6 °C), Min:97.4 °F (36.3 °C), Max:98.6 °F (37 °C)    No results found for requested labs within last 720 hours. Recent Labs     22  0303 22  0336 22  0336   BUN 20   < > 20   CREA 1.53*  --  1.46*    < > = values in this interval not displayed. HOSPITAL COURSE: [x]    Uncomplicated. COMPLICATIONS: [x]    None identified  DISCHARGE TO:     [x]    Home  []    Rehab []    SNF  FOLLOWUP: one week  DISCHARGE MEDS:     [x]    IT IS INTENDED THAT YOU CONTINUE TO TAKE YOUR PRIOR MEDICATIONS WITHOUT CHANGES WITH THE EXCEPTION OF:  []    NONE    Current Discharge Medication List      START taking these medications    Details   oxyCODONE IR (ROXICODONE) 5 mg immediate release tablet Take 1 Tablet by mouth every four (4) hours as needed (Pain Moderate (4-6)) for up to 3 days. Max Daily Amount: 30 mg.  Qty: 12 Tablet, Refills: 0  Start date: 2022, End date: 2022    Associated Diagnoses: Cancer of left renal pelvis ([facility]         CONTINUE these medications which have NOT CHANGED    Details   gabapentin (Neurontin) 600 mg tablet 1 po bid AND 2 po qhs  Qty: 360 Tablet, Refills: 1    Associated Diagnoses: Idiopathic peripheral neuropathy; Diabetic peripheral neuropathy associated with type 2 diabetes mellitus ([facility] Immune-mediated neuropathy ([facility]  Lumbar back pain with radiculopathy affecting right lower extremity; Degenerative lumbar spinal stenosis; Vitamin D deficiency; Vitamin B12 deficiency; Pain      allopurinoL (ZYLOPRIM) 100 mg tablet Take 1 tablet by mouth daily  Qty: 90 Tablet, Refills: 3    Comments: Refill [id number]  Associated Diagnoses: Hx of gout      atorvastatin (LIPITOR) 80 mg tablet Take 1 tablet by mouth daily  Qty: 90 Tablet, Refills: 3    Comments: Refill [id number]  Associated Diagnoses: Pure hypercholesterolemia      isosorbide mononitrate ER (IMDUR) 60 mg CR tablet Take 1 Tablet by mouth daily. Qty: 90 Tablet, Refills: 3      losartan (COZAAR) 50 mg tablet Take 1 Tablet by mouth daily. Qty: 90 Tablet, Refills: 3    Comments: Refill [id number]      cholecalciferol, vitamin D3, (Vitamin D3) 50 mcg (2,000 unit) tab Take 2,000 Units by mouth.      b complex vitamins tablet Take 1 Tablet by mouth daily. multivitamin (ONE A DAY) tablet Take 1 Tab by mouth daily. omega 3-dha-epa-fish oil (FISH OIL) 100-160-1,000 mg cap Take  by mouth two (2) times a day.  2 tabs bid               Génesis Concepcion NP 5/27/2022 12:57 PM

## 2022-05-27 NOTE — DISCHARGE INSTRUCTIONS
Dr. Galeano Right    ? Dr. Soraya Schmitt or his associate will see you back in the office in 1-2 weeks. ? At that time your final pathology report will be reviewed with you. DISCHARGE MEDICATIONS    ? Upon discharge you will be given a prescription for pain control  ? Most patients experience only minimal discomfort after this minimally invasive surgery. We recommend using Tylenol (acetaminophen) for pain first and reserve the narcotic pain medication as an alternative as it tends to cause constipation. ? You may resume your normal medications upon discharge from the hospital with the exception of any blood-thinning products (such as coumadin, plavix, Xeralto, aspirin, etc). You may resume blood-thinning medications in 1 week unless otherwise instructed by the prescribing physician. ACTIVITY    ? Please refrain from driving for the 1st week after your surgery. ? You may shower upon discharge from the hospital. Avoid bathtubs, hot tubs or swimming pools during the 1st 2 weeks. ? It is important to be mobile during your recovery period. Avoid sitting in one position for longer than 45 minutes to 1 hour. Walking and climbing stairs are OK. Be careful not to overdo it. ? Avoid any heavy lifting or strenuous activity for 4-6 weeks. ? Most patients ease into normal activities (including employment) after 2 weeks of recuperation. ? Most patients resume driving by the 2nd week. Please use your best judgment. CARE OF YOUR INCISION SITES    ? Application of ointments or creams to the incision sites is not recommended. ? The adhesive clear wound dressing will slough off naturally in 5 - 10 days. ? Do not pick at or apply ointments/medications to the adhesive dressing. ? Do not scrub, soak or expose incisions to prolonged moisture. DIET    ?  Please limit carbonated beverages, dairy products and any other gas producing foods (such as flour, beans, or broccoli) for the 1st week or so. Small meals are best until bowel habits return to normal.        THINGS YOU MAY ENCOUNTER AFTER SURGERY    ? Bruising around incision sites. Not at all uncommon and should not alarm you. This will resolve with time. ? Abdominal distention, constipation or bloating. Make sure you are following the dietary instructions. If you dont have a bowel movement or pass gas or are feeling uncomfortable 24 hours after surgery, try taking Milk of Magnesia as directed on the bottle. If after two doses of Milk of Magnesia, you still have not had a bowel movement, it is safe to use a Dulcolax suppository (available over the counter at your local pharmacy). ? Males may have Scrotal/Penile swelling and bruising. This is quite common and should not alarm you. It may appear immediately after surgery or may start 4 -5 days after surgery. It should resolve in about 7 - 14 days. You may try elevating your scrotum with a small towel or washcloth when lying down. ? Lower legs/ankle swelling. This is not abnormal when it occurs in both legs and should not alarm you. It should resolve in about 7 - 14 days. Elevation of your legs while sitting will help. CONTACT MD IMMEDIATELY IF YOU ARE EXPERIENCING ANY OF THE FOLLOWING SYMPTOMS:    ? Oral Temperature over 101° F.  ? Unable to urinate. ? Any pain not relieved by pain medication prescribed. ? Persistent nausea/vomiting. ? Uneven swelling of legs or ankles. ? Redness and/or large amount of swelling around your incision sites. ? Excessive bleeding or drainage from your incision sites.         [address]  [phone]

## 2022-05-27 NOTE — PROGRESS NOTES
Problem: Falls - Risk of  Goal: *Absence of Falls  Description: Document Sudhir Rosa Fall Risk and appropriate interventions in the flowsheet.   Outcome: Progressing Towards Goal  Note: Fall Risk Interventions:            Medication Interventions: Teach patient to arise slowly,Patient to call before getting OOB    Elimination Interventions: Call light in reach              Problem: Patient Education: Go to Patient Education Activity  Goal: Patient/Family Education  Outcome: Progressing Towards Goal

## 2022-05-27 NOTE — PROGRESS NOTES
Progress Note    Patient: Danay Fonseca MRN: [id number]  SSN: [id number]    YOB: 1940  Age: 80 y.o. Sex: male        ADMITTED:  2022 to Alexander Parker MD  for Malignant neoplasm of renal pelvis, unspecified laterality [facility]) [C65.9]  Cancer of left renal pelvis ([facility] [C65.2]         Danay Fonseca is 2 Days Post-Op Procedure(s):  ROBOTIC ASSITED LAPROSCOPIC LEFT RADICAL NEPHROURETERECTOMY. He is doing well. He has moderate pain. He has mild nausea. He is tolerating full liquids. Patient is voiding independently. Reports of SOB with ambulating requiring O2, wearing 2L NC this morning, mild chest tightness reported  No gas yet, some burping and mild nausea, abd soft, incisions intact      Vitals:  Temp (24hrs), Av °F (36.7 °C), Min:97.4 °F (36.3 °C), Max:98.6 °F (37 °C)     Blood pressure (!) 158/85, pulse 82, temperature 98.6 °F (37 °C), resp. rate 16, height 5' 10\" (1.778 m), weight 85.5 kg (188 lb 7.9 oz), SpO2 93 %. I&O's:   1901 -  0700  In: 1460.4 [I.V.:1460.4]  Out: 1757 [Urine:2525]   No intake/output data recorded. Exam:   abdomen soft, appropriately TTP at surgical sites. All incisions clean/dry/intact without evidence of infection.    Voiding independently      Labs:   Recent Labs     22  0303 22  0336   HGB 13.1 13.0   HCT 39.1 38.9     Recent Labs     22  0303 22  0336   * 137   K 4.6 4.8    102   CO2 31 30   * 153*   BUN 20 20   CREA 1.53* 1.46*   CA 9.1 9.0        Cultures:      Imaging:       Assessment:     - 2 Days Post-Op Procedure(s):  ROBOTIC ASSITED LAPROSCOPIC LEFT RADICAL NEPHROURETERECTOMY    Active Problems:    Cancer of left renal pelvis ([facility] (2022)        Plan:     [facility] consult to evaluate hypoxia, pending chest xray and walking pulse ox   -encourage ambulation, OOB for meals, ISB  -continue GI bland diet  -pending decision on d.c home today, will round again midday     ++ hypoxia improved this afternoon, however d-dimer elevated, vq scan showing interdetermintate risk for PE, spoke with Dr. Ernesto Colin and hosp dr. Sujey Sanders and in agreement to start full [facility] today with plan to transition to [facility] tomorrow with anticipated discharge home tomorrow     Supervising MD, Dr. Tang Linton By: Hilda Salazar, SLADE - May 27, 2022

## 2022-05-28 VITALS
DIASTOLIC BLOOD PRESSURE: 62 MMHG | WEIGHT: 188.49 LBS | SYSTOLIC BLOOD PRESSURE: 121 MMHG | BODY MASS INDEX: 26.99 KG/M2 | TEMPERATURE: 97.6 F | HEIGHT: 70 IN | OXYGEN SATURATION: 96 % | HEART RATE: 79 BPM | RESPIRATION RATE: 18 BRPM

## 2022-05-28 LAB
ANION GAP SERPL CALC-SCNC: 3 MMOL/L (ref 5–15)
APTT PPP: 61.2 SEC (ref 22.1–31)
APTT PPP: 71.5 SEC (ref 22.1–31)
BUN SERPL-MCNC: 19 MG/DL (ref 6–20)
BUN/CREAT SERPL: 14 (ref 12–20)
CALCIUM SERPL-MCNC: 8.8 MG/DL (ref 8.5–10.1)
CHLORIDE SERPL-SCNC: 106 MMOL/L (ref 97–108)
CO2 SERPL-SCNC: 29 MMOL/L (ref 21–32)
CREAT SERPL-MCNC: 1.33 MG/DL (ref 0.7–1.3)
GLUCOSE SERPL-MCNC: 126 MG/DL (ref 65–100)
HCT VFR BLD AUTO: 38.2 % (ref 36.6–50.3)
HGB BLD-MCNC: 12.9 G/DL (ref 12.1–17)
POTASSIUM SERPL-SCNC: 4 MMOL/L (ref 3.5–5.1)
SODIUM SERPL-SCNC: 138 MMOL/L (ref 136–145)
THERAPEUTIC RANGE,PTTT: ABNORMAL SECS (ref 58–77)
THERAPEUTIC RANGE,PTTT: ABNORMAL SECS (ref 58–77)

## 2022-05-28 PROCEDURE — 85730 THROMBOPLASTIN TIME PARTIAL: CPT

## 2022-05-28 PROCEDURE — 85018 HEMOGLOBIN: CPT

## 2022-05-28 PROCEDURE — 74011000250 HC RX REV CODE- 250: Performed by: UROLOGY

## 2022-05-28 PROCEDURE — 80048 BASIC METABOLIC PNL TOTAL CA: CPT

## 2022-05-28 PROCEDURE — 74011250636 HC RX REV CODE- 250/636: Performed by: INTERNAL MEDICINE

## 2022-05-28 PROCEDURE — 74011250637 HC RX REV CODE- 250/637: Performed by: UROLOGY

## 2022-05-28 PROCEDURE — 74011250637 HC RX REV CODE- 250/637: Performed by: INTERNAL MEDICINE

## 2022-05-28 PROCEDURE — 36415 COLL VENOUS BLD VENIPUNCTURE: CPT

## 2022-05-28 RX ORDER — GUAIFENESIN 600 MG/1
600 TABLET, EXTENDED RELEASE ORAL EVERY 12 HOURS
Qty: 40 TABLET | Refills: 0 | Status: SHIPPED | OUTPATIENT
Start: 2022-05-28 | End: 2022-06-17

## 2022-05-28 RX ADMIN — FAMOTIDINE 20 MG: 20 TABLET, FILM COATED ORAL at 08:16

## 2022-05-28 RX ADMIN — APIXABAN 10 MG: 5 TABLET, FILM COATED ORAL at 12:33

## 2022-05-28 RX ADMIN — HEPARIN SODIUM 16 UNITS/KG/HR: 10000 INJECTION, SOLUTION INTRAVENOUS at 07:52

## 2022-05-28 RX ADMIN — SODIUM CHLORIDE 100 ML/HR: 9 INJECTION, SOLUTION INTRAVENOUS at 06:28

## 2022-05-28 RX ADMIN — GUAIFENESIN 600 MG: 600 TABLET, EXTENDED RELEASE ORAL at 08:16

## 2022-05-28 RX ADMIN — SODIUM CHLORIDE, PRESERVATIVE FREE 10 ML: 5 INJECTION INTRAVENOUS at 07:33

## 2022-05-28 RX ADMIN — ISOSORBIDE MONONITRATE 60 MG: 30 TABLET, EXTENDED RELEASE ORAL at 08:16

## 2022-05-28 RX ADMIN — SENNOSIDES AND DOCUSATE SODIUM 1 TABLET: 50; 8.6 TABLET ORAL at 08:16

## 2022-05-28 RX ADMIN — GABAPENTIN 600 MG: 300 CAPSULE ORAL at 08:15

## 2022-05-28 RX ADMIN — ALLOPURINOL 100 MG: 100 TABLET ORAL at 08:16

## 2022-05-28 NOTE — PROGRESS NOTES
Progress Note    Patient: Ivet Crane MRN: [id number]  SSN: [id number]    YOB: 1940  Age: 80 y.o. Sex: male        ADMITTED:  2022 to Saida Flanagan MD  for Malignant neoplasm of renal pelvis, unspecified laterality [facility]) [C65.9]  Cancer of left renal pelvis ([address] [C65.2]         Ivet Crane is 2 Days Post-Op Procedure(s):  ROBOTIC ASSITED LAPROSCOPIC LEFT RADICAL NEPHROURETERECTOMY. He is doing well. He has moderate pain. He has mild nausea. He is tolerating full liquids. Patient is voiding independently. Feeling good this am no nausea today still awaiting flatus  No gas yet, some burping and mild nausea, abd soft, incisions intact      Vitals:  Temp (24hrs), Av.3 °F (36.8 °C), Min:97.5 °F (36.4 °C), Max:99 °F (37.2 °C)     Blood pressure 135/65, pulse 78, temperature 98.4 °F (36.9 °C), resp. rate 18, height 5' 10\" (1.778 m), weight 85.5 kg (188 lb 7.9 oz), SpO2 95 %. I&O's:   1901 -  0700  In: 50 [I.V.:50]  Out: 2600 [Urine:2600]   No intake/output data recorded. Exam:   abdomen soft, appropriately TTP at surgical sites. All incisions clean/dry/intact without evidence of infection.    Voiding independently      Labs:   Recent Labs     22  0438 22  1510 22  0303   WBC  --  11.4*  --    HGB 12.9 13.1 13.1   HCT 38.2 39.6 39.1   PLT  --  186  --      Recent Labs     22  0438 22  0303 22  0336    134* 137   K 4.0 4.6 4.8    102 102   CO2 29 31 30   * 133* 153*   BUN 19 20 20   CREA 1.33* 1.53* 1.46*   CA 8.8 9.1 9.0        Cultures:      Imaging:       Assessment:     - 3 Days Post-Op Procedure(s):  ROBOTIC ASSITED LAPROSCOPIC LEFT RADICAL NEPHROURETERECTOMY    Active Problems:    HTN (hypertension) (2019)      Diabetic peripheral neuropathy associated with type 2 diabetes mellitus ([facility] (2021)      [facility] (2022)      SOB (shortness of breath) (5/27/2022)      Pulmonary embolism ([address] (5/27/2022)        Plan:     -hep infusion transitioning to oral meds  -encourage ambulation, OOB for meals, ISB  -continue GI bland diet  Anticipated discharge today will check with hospitalist to ensure they are ok with that plan plan was to transition to oral meds and d/c today  H/h stable       Signed By: Claudette Steen MD - May 28, 2022

## 2022-05-28 NOTE — CONSULTS
Hospitalist Consult Service Progress Note    NAME:  Jeannie Thompson   :   1940   MRN:   [id number]     ATTENDING: Jignesh Rich MD  PCP:  Griffin iGl MD    Date/Time:  2022 11:43 AM      Recommendations/Plan:       Active Problems:    HTN (hypertension) (2019)      Diabetic peripheral neuropathy associated with type 2 diabetes mellitus ([facility] (2021)      Cancer of left renal pelvis ([facility] (2022)      SOB (shortness of breath) (2022)      Pulmonary embolism ([facility] (2022)      Post Op Transient Hypoxic resp failure ()- now resolved,  pt now off oxygen when seen this afternoon but still significantly SOB with cough/congestion  R/o Pulm embolism (post op)  H/o BORIS not on CPAP-- sleep study was>20yrs ago and pt says it was not confirmatory, since then has lost significant weight   CXR= Shallow inspiration with left basilar atelectasis or small  Infiltrate  D Dimer= 3.98    Cont incentive spirometer post op every 2 hrs as ordered  V/Q scan intermediate probability for PE - s/p IV heparin started ()- tolerated well, will switch to PO Eliquis (recommend 10mg BID for 7 days then 5mg BID thereafter x 3-6 months)  Pt was on SCDs perioperatively,  s/p IVF -Cr improved to 1.3 today  Pt remains off oxygen at rest and ambulation - DC home today on eliquis  Outpatient Pulm followup for considering OP sleep study when recovered from current urologically stable. Cont Mucinex BID to help with hypersecretions- mucolysis    WILL SIGN OFF as pt for DC home today by primary Urology team    HTN  DM with neuropathy  L renal cancer s/p radical Nephroureterectomy     Cont home meds- resume losartan today  Pain management          Code Status: Full code  DVT Prophylaxis: SCDs          Subjective:   REQUESTING PHYSICIAN:  Dr Sharon Sotelo (urology)  REASON FOR CONSULT:  SOB post op with transient Hypoxia  Karin Sanchez is a 80 y.o.    male who I was asked to see for hypoxic resp failure post op with SOB today when pt was being prepared to be discharged. Pt has remote testing for BORIS 20+ yr ago- was not a positive study per pt and no CPAP was recommended  Pt doesn't smoke  Pt does drink alcohol 4 drinks/week  Per wife. Pt denies any previous h/o DVT/PE              Past Medical History:   Diagnosis Date    Adverse effect of anesthesia     hallucinations/agitation after last surgery 2018 in hospital 3 days    Arthritis     knee and hip/right    CAD (coronary artery disease) 2001    Chronic kidney disease     left kidney tumor    Hypercholesteremia     Hypertension     Nausea & vomiting     Sleep apnea     Borderline/ No CPAP      Past Surgical History:   Procedure Laterality Date    COLONOSCOPY N/A 6/11/2020    COLONOS[facility]  6/11/2020         HX APPENDECTOMY      HX CORONARY ARTERY BYPASS GRAFT  2001    5v    HX GI      umbilical hernia x3    HX HIP REPLACEMENT Left 2017    HX KNEE ARTHROSCOPY Right 2007    right knee    IA CARDIAC SURG PROCEDURE UNLIST  03/2001    Bypass    IA CARDIAC SURG PROCEDURE UNLIST  2008    stents    IA TOTAL KNEE ARTHROPLASTY Left 2017    left hip replacement    UPPER GI ENDOSCOPY,BIOPSY  6/11/2020          Social History     Tobacco Use    Smoking status: Never Smoker    Smokeless tobacco: Never Used   Substance Use Topics    Alcohol use: Yes     Alcohol/week: 3.0 standard drinks     Types: 3 Cans of beer per week     Comment: Daily 1-2 drinks      Family History   Problem Relation Age of Onset    Cancer Mother         LUNG    Diabetes Mother     COPD Mother     No Known Problems Father     Diabetes Maternal Grandmother     Other Daughter         [facility]       No Known Allergies   Prior to Admission medications    Medication Sig Start Date End Date Taking?  Authorizing Provider   oxyCODONE IR (ROXICODONE) 5 mg immediate release tablet Take 1 Tablet by mouth every four (4) hours as needed (Pain Moderate (4-6)) for up to 3 days. Max Daily Amount: 30 mg. 5/27/22 5/30/22 Yes Cher Conde NP   gabapentin (Neurontin) 600 mg tablet 1 po bid AND 2 po qhs  Patient taking differently: 600 mg two (2) times a day. 1 po bid AND 2 po qhs 2/21/22  Yes Cristhian Reynaga MD   allopurinoL (ZYLOPRIM) 100 mg tablet Take 1 tablet by mouth daily  Patient taking differently: Take 100 mg by mouth daily. Take 1 tablet by mouth daily 2/11/22  Yes Kamron Maldonado MD   atorvastatin (LIPITOR) 80 mg tablet Take 1 tablet by mouth daily 2/11/22  Yes Kamron Maldonado MD   isosorbide mononitrate ER (IMDUR) 60 mg CR tablet Take 1 Tablet by mouth daily. 2/11/22  Yes Kamron Maldonado MD   losartan (COZAAR) 50 mg tablet Take 1 Tablet by mouth daily. 2/11/22  Yes Kamron Maldonado MD   cholecalciferol, vitamin D3, (Vitamin D3) 50 mcg (2,000 unit) tab Take 2,000 Units by mouth. Provider, Historical   b complex vitamins tablet Take 1 Tablet by mouth daily. Provider, Historical   multivitamin (ONE A DAY) tablet Take 1 Tab by mouth daily. Provider, Historical   omega 3-dha-epa-fish oil (FISH OIL) 100-160-1,000 mg cap Take  by mouth two (2) times a day.  2 tabs bid    Provider, Historical       REVIEW OF SYSTEMS:     Total of 12 systems reviewed as follows:        POSITIVE= underlined text  Negative = text not underlined  General:  fever, chills, sweats, generalized weakness, weight loss/gain,      loss of appetite   Eyes:    blurred vision, eye pain, loss of vision, double vision  ENT:    rhinorrhea, pharyngitis   Respiratory:   cough, sputum production, SOB, wheezing, CHÁVEZ, pleuritic pain   Cardiology:   chest pain, palpitations, orthopnea, PND, edema, syncope   Gastrointestinal:  abdominal pain , N/V, dysphagia, diarrhea, constipation, bleeding   Genitourinary:  frequency, urgency, dysuria, hematuria, incontinence   Muskuloskeletal :  arthralgia, myalgia   Hematology:  easy bruising, nose or gum bleeding, lymphadenopathy Dermatological: rash, ulceration, pruritis   Endocrine:   hot flashes or polydipsia   Neurological:  headache, dizziness, confusion, focal weakness, paresthesia,     Speech difficulties, memory loss, gait disturbance  Psychological: Feelings of anxiety, depression, agitation    Objective:   VITALS:    Visit Vitals  /65   Pulse 78   Temp 98.4 °F (36.9 °C)   Resp 18   Ht 5' 10\" (1.778 m)   Wt 85.5 kg (188 lb 7.9 oz)   SpO2 95%   BMI 27.05 kg/m²     Temp (24hrs), Av.2 °F (36.8 °C), Min:97.5 °F (36.4 °C), Max:99 °F (37.2 °C)      PHYSICAL EXAM:   General:    Alert, cooperative, no distress, appears stated age. Obese+    HEENT: Atraumatic, anicteric sclerae, pink conjunctivae     No oral ulcers, mucosa moist, throat clear  Neck:  Supple, symmetrical,  thyroid: non tender  Lungs:   Distant BS +, no obvious crackles or wheezing  Chest wall:  No tenderness  No Accessory muscle use. Heart:   Regular  rhythm,  No  murmur   No edema  Abdomen:   Soft, non-tender. Not distended. Bowel sounds normal  Extremities: No cyanosis. No clubbing  Skin:     Not pale. Not Jaundiced  No rashes   Psych:  Good insight. Not depressed. Not anxious or agitated. Neurologic: EOMs intact. No facial asymmetry. No aphasia or slurred speech.  Symmetrical strength, Alert and oriented X 4.     _______________________________________________________________________  Care Plan discussed with:    Comments   Patient x    Family  x Wife at bedside in 5   RN x    Care Manager                    Consultant:  x Urology team yesterday   ____________________________________________________________________  TOTAL TIME:     26 mins    Comments    x Reviewed previous records   >50% of visit spent in counseling and coordination of care x Discussion with patient and family and questions answered       Critical Care Provided     Minutes non procedure based  ________________________________________________________________________  Signed: Juan Ross Mahamed Kline MD      Procedures: see electronic medical records for all procedures/Xrays and details which were not copied into this note but were reviewed prior to creation of Plan. LAB DATA REVIEWED:    Recent Results (from the past 24 hour(s))   D DIMER    Collection Time: 05/27/22 12:16 PM   Result Value Ref Range    D-dimer 3.98 (H) 0.00 - 0.65 mg/L FEU   PTT    Collection Time: 05/27/22  3:10 PM   Result Value Ref Range    aPTT 33.9 (H) 22.1 - 31.0 sec    aPTT, therapeutic range     58.0 - 77.0 SECS   CBC WITH AUTOMATED DIFF    Collection Time: 05/27/22  3:10 PM   Result Value Ref Range    WBC 11.4 (H) 4.1 - 11.1 K/uL    RBC 4.31 4.10 - 5.70 M/uL    HGB 13.1 12.1 - 17.0 g/dL    HCT 39.6 36.6 - 50.3 %    MCV 91.9 80.0 - 99.0 FL    MCH 30.4 26.0 - 34.0 PG    MCHC 33.1 30.0 - 36.5 g/dL    RDW 13.7 11.5 - 14.5 %    PLATELET 707 580 - 721 K/uL    MPV 9.7 8.9 - 12.9 FL    NRBC 0.0 0  WBC    ABSOLUTE NRBC 0.00 0.00 - 0.01 K/uL    NEUTROPHILS 75 32 - 75 %    LYMPHOCYTES 14 12 - 49 %    MONOCYTES 11 5 - 13 %    EOSINOPHILS 0 0 - 7 %    BASOPHILS 0 0 - 1 %    IMMATURE GRANULOCYTES 0 0.0 - 0.5 %    ABS. NEUTROPHILS 8.4 (H) 1.8 - 8.0 K/UL    ABS. LYMPHOCYTES 1.6 0.8 - 3.5 K/UL    ABS. MONOCYTES 1.3 (H) 0.0 - 1.0 K/UL    ABS. EOSINOPHILS 0.0 0.0 - 0.4 K/UL    ABS. BASOPHILS 0.0 0.0 - 0.1 K/UL    ABS. IMM.  GRANS. 0.0 0.00 - 0.04 K/UL    DF AUTOMATED     PTT    Collection Time: 05/27/22  9:25 PM   Result Value Ref Range    aPTT 85.3 (H) 22.1 - 31.0 sec    aPTT, therapeutic range     58.0 - 77.0 SECS   PTT    Collection Time: 05/28/22  4:38 AM   Result Value Ref Range    aPTT 61.2 (H) 22.1 - 31.0 sec    aPTT, therapeutic range     58.0 - 49.1 SECS   METABOLIC PANEL, BASIC    Collection Time: 05/28/22  4:38 AM   Result Value Ref Range    Sodium 138 136 - 145 mmol/L    Potassium 4.0 3.5 - 5.1 mmol/L    Chloride 106 97 - 108 mmol/L    CO2 29 21 - 32 mmol/L    Anion gap 3 (L) 5 - 15 mmol/L    Glucose 126 (H) 65 - 100 mg/dL BUN 19 6 - 20 MG/DL    Creatinine 1.33 (H) 0.70 - 1.30 MG/DL    BUN/Creatinine ratio 14 12 - 20      GFR est AA >60 >60 ml/min/1.73m2    GFR est non-AA 51 (L) >60 ml/min/1.73m2    Calcium 8.8 8.5 - 10.1 MG/DL   HGB & HCT    Collection Time: 05/28/22  4:38 AM   Result Value Ref Range    HGB 12.9 12.1 - 17.0 g/dL    HCT 38.2 36.6 - 50.3 %   PTT    Collection Time: 05/28/22 10:13 AM   Result Value Ref Range    aPTT 71.5 (H) 22.1 - 31.0 sec    aPTT, therapeutic range     58.0 - 77.0 SECS       _____________________________  Hospitalist: Divina Jain MD

## 2022-05-28 NOTE — PROGRESS NOTES
DISCHARGE NOTE FROM [facility] NURSE    Patient determined to be stable for discharge by attending provider. I have reviewed the discharge instructions and follow-up appointments with the patient and spouse. They verbalized understanding and all questions were answered to their satisfaction. No complaints or further questions were expressed. Medications sent to pharmacy. Appropriate educational materials and medication side effect teaching were provided. PIV were removed prior to discharge. Patient did not discharge with any line, delarosa, or drain. All personal items collected during admission were returned to the patient prior to discharge. Post-op patient: Yes- Patient given post-op discharge kit and instructed on use.        Maco Paz RN `      August 14, 2020      Re:   Verena Alexis  [address]  [place] [address]           This is to certify that Verena Alexis had an appointment at this clinic for professional attention on:  8/14/2020    Patient was advise to increase her activity to improve her weight and health,She was advise to count her steps daily and recommended to use a device such as fit bit to her to achieve her goal.      Electronically signed by MD ROSY Snow MD  [address] [place] [address]  [phone]

## 2022-05-31 ENCOUNTER — PATIENT OUTREACH (OUTPATIENT)
Dept: CASE MANAGEMENT | Age: 82
End: 2022-05-31

## 2022-05-31 RX ORDER — ACETAMINOPHEN 500 MG
1000 TABLET ORAL
COMMUNITY

## 2022-05-31 NOTE — PROGRESS NOTES
.  Care Transitions Initial Call    Call within 2 business days of discharge: Yes     Patient: Luz Bradley Patient : 1940 MRN: [id number]    Last Discharge [address]       Complaint Diagnosis Description Type Department Provider    22  Cancer of left renal pelvis [facility]) Admission (Discharged) Gutierrez Forman MD          Was this an external facility discharge? No     Challenges to be reviewed by the provider   Additional needs identified to be addressed with provider:   Denies SOB, compliant with incentive spirometer-able to pull 1500  Consider future OP sleep study  Starting on  will take Eliquis 5 mg BID     Method of communication with provider : chart routing    Discussed COVID-19 related testing which was not done at this time. Advance Care Planning:   Does patient have an Advance Directive: yes, reviewed and current. Inpatient Readmission Risk score: Unplanned Readmit Risk Score: 6.3 ( )    Was this a readmission? no   Patient stated reason for the admission: planned surgery    Patients top risk factors for readmission: NA   Interventions to address risk factors: Scheduled appointment with PCP-, Scheduled appointment with Aj Tay and reviewed discharge summary and/or continuity of care documents and Assessment and support for treatment adherence and medication management-Eliquis    Care Transition Nurse (CTN) contacted the spouse, Josef Amador by telephone to perform post hospital discharge assessment. Verified name and  with family as identifiers. Provided introduction to self, and explanation of the CTN role. Reports patient is doing well. Denies chest pain, sob, fever, nausea. Tolerating PO. Reports incision site without evidence of infection. Reports procedure site soreness. Taking Tylenol PRN. States patient is up and moving around. Compliant with incentive spirometer. Uses 3-4 times per day or PRN.     CTN reviewed discharge instructions, medical action plan and red flags with family who verbalized understanding. Were discharge instructions available to patient? yes. Reviewed appropriate site of care based on symptoms and resources available to patient including: PCP and Specialist. Family given an opportunity to ask questions and does not have any further questions or concerns at this time. The family agrees to contact the PCP office for questions related to their healthcare. Medication reconciliation was performed with family, who verbalizes understanding of administration of home medications. Referral to Pharm D needed: no     Home Health/Outpatient orders at discharge: none    Durable Medical Equipment ordered at discharge: None    Was patient discharged with a pulse oximeter? no    Discussed follow-up appointments. If no appointment was previously scheduled, appointment scheduling offered: no. Is follow up appointment scheduled within 7 days of discharge? yes. [address] follow up appointment(s):   Future Appointments   Date Time Provider Ramya Kang   6/7/2022  1:30 PM Jammie Munoz MD [facility] BS AMB   6/16/2022  2:00 PM Mili Boles NP [facility] AMB     [facility] follow up appointment(s): 5/31 Dr. Latonya Sorto, urology    Plan for follow-up call in 5-7 days based on severity of symptoms and risk factors. Plan for next call: self management-procedure site and follow up appointment-urology appt  CTN provided contact information for future needs. Goals Addressed                 This Visit's Progress     Prevent complications post hospitalization.           05/31/22   Will not fall   Will monitor procedure site for evidence of infection   Will monitor for bleeding   Will monitor for s/s DVT/PE i.e calf pain/swelling, increased SOB   Will attend follow up appt with Dr. Latonya Sorto scheduled 5/31 and Dr. Luis Faye scheduled 6/7   Pt will remain out of the hospital or ER for remainder of KEN period

## 2022-06-07 ENCOUNTER — OFFICE VISIT (OUTPATIENT)
Dept: INTERNAL MEDICINE CLINIC | Age: 82
End: 2022-06-07
Payer: MEDICARE

## 2022-06-07 VITALS
HEART RATE: 81 BPM | RESPIRATION RATE: 16 BRPM | OXYGEN SATURATION: 95 % | SYSTOLIC BLOOD PRESSURE: 118 MMHG | TEMPERATURE: 97.3 F | BODY MASS INDEX: 26.66 KG/M2 | DIASTOLIC BLOOD PRESSURE: 70 MMHG | HEIGHT: 70 IN | WEIGHT: 186.2 LBS

## 2022-06-07 DIAGNOSIS — N18.30 STAGE 3 CHRONIC KIDNEY DISEASE, UNSPECIFIED WHETHER STAGE 3A OR 3B CKD (HCC): ICD-10-CM

## 2022-06-07 DIAGNOSIS — G63 IMMUNE-MEDIATED NEUROPATHY (HCC): ICD-10-CM

## 2022-06-07 DIAGNOSIS — Z00.00 MEDICARE ANNUAL WELLNESS VISIT, SUBSEQUENT: ICD-10-CM

## 2022-06-07 DIAGNOSIS — I25.10 CORONARY ARTERY DISEASE INVOLVING NATIVE CORONARY ARTERY OF NATIVE HEART WITHOUT ANGINA PECTORIS: ICD-10-CM

## 2022-06-07 DIAGNOSIS — C68.9 UROTHELIAL CANCER (HCC): ICD-10-CM

## 2022-06-07 DIAGNOSIS — E78.00 PURE HYPERCHOLESTEROLEMIA: ICD-10-CM

## 2022-06-07 DIAGNOSIS — D89.89 IMMUNE-MEDIATED NEUROPATHY (HCC): ICD-10-CM

## 2022-06-07 DIAGNOSIS — E11.40 TYPE 2 DIABETES MELLITUS WITH DIABETIC NEUROPATHY, WITHOUT LONG-TERM CURRENT USE OF INSULIN (HCC): Primary | ICD-10-CM

## 2022-06-07 DIAGNOSIS — I10 HYPERTENSION, UNSPECIFIED TYPE: ICD-10-CM

## 2022-06-07 PROCEDURE — G8754 DIAS BP LESS 90: HCPCS | Performed by: INTERNAL MEDICINE

## 2022-06-07 PROCEDURE — G8427 DOCREV CUR MEDS BY ELIG CLIN: HCPCS | Performed by: INTERNAL MEDICINE

## 2022-06-07 PROCEDURE — G8510 SCR DEP NEG, NO PLAN REQD: HCPCS | Performed by: INTERNAL MEDICINE

## 2022-06-07 PROCEDURE — G8752 SYS BP LESS 140: HCPCS | Performed by: INTERNAL MEDICINE

## 2022-06-07 PROCEDURE — G0439 PPPS, SUBSEQ VISIT: HCPCS | Performed by: INTERNAL MEDICINE

## 2022-06-07 PROCEDURE — 1101F PT FALLS ASSESS-DOCD LE1/YR: CPT | Performed by: INTERNAL MEDICINE

## 2022-06-07 PROCEDURE — G8536 NO DOC ELDER MAL SCRN: HCPCS | Performed by: INTERNAL MEDICINE

## 2022-06-07 PROCEDURE — 99213 OFFICE O/P EST LOW 20 MIN: CPT | Performed by: INTERNAL MEDICINE

## 2022-06-07 PROCEDURE — 1111F DSCHRG MED/CURRENT MED MERGE: CPT | Performed by: INTERNAL MEDICINE

## 2022-06-07 PROCEDURE — G8417 CALC BMI ABV UP PARAM F/U: HCPCS | Performed by: INTERNAL MEDICINE

## 2022-06-07 NOTE — PROGRESS NOTES
1. \"Have you been to the ER, urgent care clinic since your last visit? Hospitalized since your last visit? \"  Yes, Noted in chart 5/25/22    2. \"Have you seen or consulted any other health care providers outside of the [address] [place] [address] since your last visit? \" Yes, Cardiologist     3. For patients aged 39-70: Has the patient had a colonoscopy / FIT/ Cologuard?   Yes, Dr. Person Mayking

## 2022-06-07 NOTE — PROGRESS NOTES
HISTORY OF PRESENT ILLNESS  Luz Bradley is a 80 y.o. male. 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  Still has moderate pain in feet and lower legs  Has hx mild loc- referral to sleep med was placed-pt reports he feels sleep has been ok without cpap  Last a1c 6.4 LDL 50  Not on a strict diet-snacks too much and too many cards  Had recent lumbar MESFIN for spinal stenosis and right leg pain--improved--has fu Dr Carlos Rincon soon     No recent gout    Patient Active Problem List    Diagnosis Date Noted    Chronic renal disease, stage III 06/07/2022    SOB (shortness of breath) 05/27/2022    Pulmonary embolism ([address] 05/27/2022    Cancer of left renal pelvis ([address] 05/25/2022    Diabetic peripheral neuropathy associated with type 2 diabetes mellitus ([address] 12/14/2021    Immune-mediated neuropathy ([address] 12/14/2021    Lumbar back pain with radiculopathy affecting right lower extremity 12/14/2021    Degenerative lumbar spinal stenosis 12/14/2021    Vitamin B12 deficiency 12/14/2021    Vitamin D deficiency 12/14/2021    Diverticulosis 06/29/2019    Status post right hip replacement 06/13/2019    HTN (hypertension) 05/28/2019    Pure hypercholesterolemia 05/28/2019    Hx of gout 05/28/2019    Hx of colonic polyps 05/28/2019    Coronary artery disease involving native coronary artery of native heart without angina pectoris 05/28/2019    Rosacea 05/28/2019    Idiopathic peripheral neuropathy 05/28/2019     Current Outpatient Medications   Medication Sig Dispense Refill    acetaminophen (Tylenol Extra Strength) 500 mg tablet Take 1,000 mg by mouth every six (6) hours as needed for Pain.  apixaban (ELIQUIS) 5 mg tablet Take 1 Tablet by mouth every twelve (12) hours for 88 days. Indications: a clot in the lung 60 Tablet 2    guaiFENesin ER (MUCINEX) 600 mg ER tablet Take 1 Tablet by mouth every twelve (12) hours for 20 days. 40 Tablet 0    allopurinoL (ZYLOPRIM) 100 mg tablet Take 1 tablet by mouth daily (Patient taking differently: Take 100 mg by mouth daily. Take 1 tablet by mouth daily) 90 Tablet 3    atorvastatin (LIPITOR) 80 mg tablet Take 1 tablet by mouth daily 90 Tablet 3    isosorbide mononitrate ER (IMDUR) 60 mg CR tablet Take 1 Tablet by mouth daily. 90 Tablet 3    losartan (COZAAR) 50 mg tablet Take 1 Tablet by mouth daily. 90 Tablet 3    cholecalciferol, vitamin D3, (Vitamin D3) 50 mcg (2,000 unit) tab Take 2,000 Units by mouth.  b complex vitamins tablet Take 1 Tablet by mouth daily.  multivitamin (ONE A DAY) tablet Take 1 Tab by mouth daily.  omega 3-dha-epa-fish oil (FISH OIL) 100-160-1,000 mg cap Take  by mouth two (2) times a day. 2 tabs bid      gabapentin (Neurontin) 600 mg tablet 1 po bid AND 2 po qhs (Patient taking differently: 600 mg two (2) times a day.  1 po bid AND 2 po qhs) 360 Tablet 1     No Known Allergies   Lab Results   Component Value Date/Time    WBC 11.4 (H) 05/27/2022 03:10 PM    HGB 12.9 05/28/2022 04:38 AM    HCT 38.2 05/28/2022 04:38 AM    PLATELET 004 89/47/7551 03:10 PM    MCV 91.9 05/27/2022 03:10 PM     Lab Results   Component Value Date/Time    Hemoglobin A1c 6.6 (H) 12/07/2021 12:12 PM    Hemoglobin A1c 6.4 (H) 06/03/2021 08:35 AM    Hemoglobin A1c 6.5 (H) 05/28/2019 11:44 AM Glucose 126 (H) 05/28/2022 04:38 AM    Glucose (POC) 119 (H) 06/13/2019 10:36 AM    Microalb/Creat ratio (ug/mg creat.) 10.7 11/26/2019 12:54 PM    LDL, calculated 50.6 06/03/2021 08:35 AM    Creatinine (POC) 1.0 04/24/2020 01:47 PM    Creatinine 1.33 (H) 05/28/2022 04:38 AM      Lab Results   Component Value Date/Time    Cholesterol, total 116 06/03/2021 08:35 AM    HDL Cholesterol 49 06/03/2021 08:35 AM    LDL, calculated 50.6 06/03/2021 08:35 AM    Triglyceride 82 06/03/2021 08:35 AM    CHOL/HDL Ratio 2.4 06/03/2021 08:35 AM     Lab Results   Component Value Date/Time    GFR est non-AA 51 (L) 05/28/2022 04:38 AM    GFRNA, POC >60 04/24/2020 01:47 PM    GFR est AA >60 05/28/2022 04:38 AM    GFRAA, POC >60 04/24/2020 01:47 PM    Creatinine 1.33 (H) 05/28/2022 04:38 AM    Creatinine (POC) 1.0 04/24/2020 01:47 PM    BUN 19 05/28/2022 04:38 AM    Sodium 138 05/28/2022 04:38 AM    Potassium 4.0 05/28/2022 04:38 AM    Chloride 106 05/28/2022 04:38 AM    CO2 29 05/28/2022 04:38 AM        ROS    Physical Exam  Vitals and nursing note reviewed. Constitutional:       General: He is not in acute distress. Appearance: He is well-developed. Comments: Appears stated age   HENT:      Head: Normocephalic. Cardiovascular:      Rate and Rhythm: Normal rate and regular rhythm. Heart sounds: Normal heart sounds. Pulmonary:      Effort: Pulmonary effort is normal.      Breath sounds: Normal breath sounds. Abdominal:      Palpations: Abdomen is soft. Neurological:      Mental Status: He is alert. ASSESSMENT and PLAN  Diagnoses and all orders for this visit:    1. Type 2 diabetes mellitus with diabetic neuropathy, without long-term current use of insulin (HCC)  -     HEMOGLOBIN A1C WITH EAG; Future  -     MICROALBUMIN, UR, RAND W/ MICROALB/CREAT RATIO; Future   Diet controlled  2. Immune-mediated neuropathy ([address]   Fu neurologist-increased numbness in legs   ? Diabetic neuropathy  3.  Hypertension, unspecified type   controlled  4. Pure hypercholesterolemia  -     LIPID PANEL; Future    5. Left urothelial cancer   S/p nephrectomy   Jennifer COPPOLA MD       6. Stage 3 chronic kidney disease, unspecified whether stage 3a or 3b CKD ([facility]    7. Coronary artery disease involving native coronary artery of native heart without angina pectoris    8. Medicare annual wellness visit, subsequent      Follow-up and Dispositions    · Return in about 6 months (around 12/7/2022) for fu Dm-2 htn hld cad Pulm Embolus left renal cancer. This is the Subsequent Medicare Annual Wellness Exam, performed 12 months or more after the Initial AWV or the last Subsequent AWV    I have reviewed the patient's medical history in detail and updated the computerized patient record. Assessment/Plan   Education and counseling provided:  Are appropriate based on today's review and evaluation  Screening for glaucoma    1. Type 2 diabetes mellitus with diabetic neuropathy, without long-term current use of insulin ([address]  2. Immune-mediated neuropathy ([address]  3. Hypertension, unspecified type  4. Pure hypercholesterolemia  5. Left urothelial cancer-jennifer COPPOLA MD  6. Stage 3 chronic kidney disease, unspecified whether stage 3a or 3b CKD ([address]  7. Coronary artery disease involving native coronary artery of native heart without angina pectoris       Depression Risk Factor Screening     3 most recent PHQ Screens 6/1/2022   Little interest or pleasure in doing things Not at all   Feeling down, depressed, irritable, or hopeless Not at all   Total Score PHQ 2 0       Alcohol & Drug Abuse Risk Screen   Do you average more than 1 drink per night or more than 7 drinks a week?: (P) No  In the past three months have you had more than 4 drinks containing alcohol on one occasion?: (P) No            Functional Ability and Level of Safety   Hearing:  Hearing: (P) Patient wears hearing aid      Activities of Daily Living:   The home contains: (P) grab bars  Functional ADLs: (P) Patient does total self care     Ambulation:  Patient ambulates: (P) with no difficulty     Fall Risk:  Fall Risk Assessment, last 12 mths 6/7/2022   Able to walk? Yes   Fall in past 12 months? 0   Do you feel unsteady?  0   Are you worried about falling 0     Abuse Screen:  Do you ever feel afraid of your partner?: (P) No  Are you in a relationship with someone who physically or mentally threatens you?: (P) No  Is it safe for you to go home?: (P) Yes        Cognitive Screening   Has your family/caregiver stated any concerns about your memory?: (P) No     Cognitive Screening: Normal - Verbal Fluency Test    Health Maintenance Due     Health Maintenance Due   Topic Date Due    Eye Exam Retinal or Dilated  Never done    Foot Exam Q1  11/26/2020    MICROALBUMIN Q1  11/26/2020    Medicare Yearly Exam  06/03/2022    Lipid Screen  06/03/2022       Patient Care Team   Patient Care Team:  Caitlin Brunner MD as PCP - General (Internal Medicine Physician)  Caitlin Brunner [facility] PCP [facility]  Haley Olson MD (Orthopedic Surgery)  Dion Wiley RN as Care Transitions Nurse    History     Patient Active Problem List   Diagnosis Code    HTN (hypertension) I10    Pure hypercholesterolemia E78.00    Hx of gout Z87.39    Hx of colonic polyps Z86.010    Coronary artery disease involving native coronary artery of native heart without angina pectoris I25.10    Rosacea L71.9    Idiopathic peripheral neuropathy G60.9    Status post right hip replacement Z96.641    Diverticulosis K57.90    Diabetic peripheral neuropathy associated with type 2 diabetes mellitus ([address] E11.42    Immune-mediated neuropathy ([address] D89.89, G63    Lumbar back pain with radiculopathy affecting right lower extremity M54.16    Degenerative lumbar spinal stenosis M48.061    Vitamin B12 deficiency E53.8    Vitamin D deficiency E55.9    Cancer of left renal pelvis (HCC) C65.2    SOB (shortness of breath) R06.02    Pulmonary embolism (HCC) I26.99    Chronic renal disease, stage III N18.30     Past Medical History:   Diagnosis Date    Adverse effect of anesthesia     hallucinations/agitation after last surgery 2018 in hospital 3 days    Arthritis     knee and hip/right    CAD (coronary artery disease) 2001    Chronic kidney disease     left kidney tumor    Hypercholesteremia     Hypertension     Nausea & vomiting     Sleep apnea     Borderline/ No CPAP      Past Surgical History:   Procedure Laterality Date    COLONOSCOPY N/A 6/11/2020    COLONOSCOPY performed [facility] Carlene Hudson [facility] at [facility]  6/11/2020         HX APPENDECTOMY      HX CORONARY ARTERY BYPASS GRAFT  2001    5v    HX GI      umbilical hernia x3    HX HIP REPLACEMENT Left 2017    HX KNEE ARTHROSCOPY Right 2007    right knee    TN CARDIAC SURG PROCEDURE UNLIST  03/2001    Bypass    TN CARDIAC SURG PROCEDURE UNLIST  2008    stents    TN TOTAL KNEE ARTHROPLASTY Left 2017    left hip replacement    UPPER GI ENDOSCOPY,BIOPSY  6/11/2020          Current Outpatient Medications   Medication Sig Dispense Refill    acetaminophen (Tylenol Extra Strength) 500 mg tablet Take 1,000 mg by mouth every six (6) hours as needed for Pain.  apixaban (ELIQUIS) 5 mg tablet Take 1 Tablet by mouth every twelve (12) hours for 88 days. Indications: a clot in the lung 60 Tablet 2    guaiFENesin ER (MUCINEX) 600 mg ER tablet Take 1 Tablet by mouth every twelve (12) hours for 20 days. 40 Tablet 0    allopurinoL (ZYLOPRIM) 100 mg tablet Take 1 tablet by mouth daily (Patient taking differently: Take 100 mg by mouth daily. Take 1 tablet by mouth daily) 90 Tablet 3    atorvastatin (LIPITOR) 80 mg tablet Take 1 tablet by mouth daily 90 Tablet 3    isosorbide mononitrate ER (IMDUR) 60 mg CR tablet Take 1 Tablet by mouth daily. 90 Tablet 3    losartan (COZAAR) 50 mg tablet Take 1 Tablet by mouth daily.  90 Tablet 3    cholecalciferol, vitamin D3, (Vitamin D3) 50 mcg (2,000 unit) tab Take 2,000 Units by mouth.  b complex vitamins tablet Take 1 Tablet by mouth daily.  multivitamin (ONE A DAY) tablet Take 1 Tab by mouth daily.  omega 3-dha-epa-fish oil (FISH OIL) 100-160-1,000 mg cap Take  by mouth two (2) times a day. 2 tabs bid      gabapentin (Neurontin) 600 mg tablet 1 po bid AND 2 po qhs (Patient taking differently: 600 mg two (2) times a day. 1 po bid AND 2 po qhs) 360 Tablet 1     No Known Allergies    Family History   Problem Relation Age of Onset    Cancer Mother         LUNG    Diabetes Mother     COPD Mother     No Known Problems Father     Diabetes Maternal Grandmother     Other Daughter         [facility]     Social History     Tobacco Use    Smoking status: Never Smoker    Smokeless tobacco: Never Used   Substance Use Topics    Alcohol use:  Yes     Alcohol/week: 3.0 standard drinks     Types: 3 Cans of beer per week     Comment: Daily 1-2 drinks         Olvin Andrew MD

## 2022-06-07 NOTE — PATIENT INSTRUCTIONS
Office Policies    Phone calls/patient messages:            Please allow up to 24 hours for someone in the office to contact you about your call or message. Be mindful your provider may be out of the office or your message may require further review. We encourage you to use [employer] for your messages as this is a faster, more efficient way to communicate with our office                         Medication Refills:            Prescription medications require 48-72 business hours to process. We encourage you to use [employer] for your refills. For controlled medications: Please allow 72 business hours to process. Certain medications may require you to  a written prescription at our office. NO narcotic/controlled medications will be prescribed after 4pm Monday through Friday or on weekends              Form/Paperwork Completion:            Please note a $25 fee may incur for all paperwork for completed by our providers. We ask that you allow 7-10 business days. Pre-payment is due prior to picking up/faxing the completed form. You may also download your forms to [employer] to have your doctor print off. Medicare Wellness Visit, Male    The best way to live healthy is to have a lifestyle where you eat a well-balanced diet, exercise regularly, limit alcohol use, and quit all forms of tobacco/nicotine, if applicable. Regular preventive services are another way to keep healthy. Preventive services (vaccines, screening tests, monitoring & exams) can help personalize your care plan, which helps you manage your own care. Screening tests can find health problems at the earliest stages, when they are easiest to treat. [facility] follows the current, evidence-based guidelines published by the [facility] [place] (USPSTF) when recommending preventive services for our patients.  Because we follow these guidelines, sometimes recommendations change over time as research supports it. (For example, a prostate screening blood test is no longer routinely recommended for men with no symptoms). Of course, you and your doctor may decide to screen more often for some diseases, based on your risk and co-morbidities (chronic disease you are already diagnosed with). Preventive services for you include:  - Medicare offers their members a free annual wellness visit, which is time for you and your primary care provider to discuss and plan for your preventive service needs. Take advantage of this benefit every year!  -All adults over age 72 should receive the recommended pneumonia vaccines. Current USPSTF guidelines recommend a series of two vaccines for the best pneumonia protection.   -All adults should have a flu vaccine yearly and tetanus vaccine every 10 years.  -All adults age 48 and older should receive the shingles vaccines (series of two vaccines). -All adults age 38-68 who are overweight should have a diabetes screening test once every three years.   -Other screening tests & preventive services for persons with diabetes include: an eye exam to screen for diabetic retinopathy, a kidney function test, a foot exam, and stricter control over your cholesterol.   -Cardiovascular screening for adults with routine risk involves an electrocardiogram (ECG) at intervals determined by the provider.   -Colorectal cancer screening should be done for adults age 54-65 with no increased risk factors for colorectal cancer. There are a number of acceptable methods of screening for this type of cancer. Each test has its own benefits and drawbacks. Discuss with your provider what is most appropriate for you during your annual wellness visit.  The different tests include: colonoscopy (considered the best screening method), a fecal occult blood test, a fecal DNA test, and sigmoidoscopy.  -All adults born between [facility] should be screened once for Hepatitis C.  -An Abdominal Aortic Aneurysm (AAA) Screening is recommended for men age 73-68 who has ever smoked in their lifetime.      Here is a list of your current Health Maintenance items (your personalized list of preventive services) with a due date:  Health Maintenance Due   Topic Date Due    Eye Exam  Never done    Diabetic Foot Care  11/26/2020    Albumin Urine Test  11/26/2020    Cholesterol Test   06/03/2022

## 2022-06-08 ENCOUNTER — PATIENT OUTREACH (OUTPATIENT)
Dept: CASE MANAGEMENT | Age: 82
End: 2022-06-08

## 2022-06-08 LAB
CHOLEST SERPL-MCNC: 121 MG/DL
CREAT UR-MCNC: 126 MG/DL
EST. AVERAGE GLUCOSE BLD GHB EST-MCNC: 134 MG/DL
HBA1C MFR BLD: 6.3 % (ref 4–5.6)
HDLC SERPL-MCNC: 43 MG/DL
HDLC SERPL: 2.8 {RATIO} (ref 0–5)
LDLC SERPL CALC-MCNC: 49.8 MG/DL (ref 0–100)
MICROALBUMIN UR-MCNC: 1.33 MG/DL
MICROALBUMIN/CREAT UR-RTO: 11 MG/G (ref 0–30)
TRIGL SERPL-MCNC: 141 MG/DL (ref ?–150)
VLDLC SERPL CALC-MCNC: 28.2 MG/DL

## 2022-06-16 ENCOUNTER — OFFICE VISIT (OUTPATIENT)
Dept: NEUROLOGY | Age: 82
End: 2022-06-16
Payer: MEDICARE

## 2022-06-16 VITALS
SYSTOLIC BLOOD PRESSURE: 118 MMHG | WEIGHT: 190.8 LBS | DIASTOLIC BLOOD PRESSURE: 68 MMHG | TEMPERATURE: 97.5 F | OXYGEN SATURATION: 97 % | HEIGHT: 70 IN | RESPIRATION RATE: 18 BRPM | HEART RATE: 81 BPM | BODY MASS INDEX: 27.32 KG/M2

## 2022-06-16 DIAGNOSIS — M54.16 LUMBAR RADICULOPATHY, CHRONIC: ICD-10-CM

## 2022-06-16 DIAGNOSIS — M70.62 GREATER TROCHANTERIC BURSITIS, LEFT: ICD-10-CM

## 2022-06-16 DIAGNOSIS — E11.42 DIABETIC PERIPHERAL NEUROPATHY ASSOCIATED WITH TYPE 2 DIABETES MELLITUS (HCC): Primary | ICD-10-CM

## 2022-06-16 PROCEDURE — 1123F ACP DISCUSS/DSCN MKR DOCD: CPT | Performed by: NURSE PRACTITIONER

## 2022-06-16 PROCEDURE — 3044F HG A1C LEVEL LT 7.0%: CPT | Performed by: NURSE PRACTITIONER

## 2022-06-16 PROCEDURE — 99214 OFFICE O/P EST MOD 30 MIN: CPT | Performed by: NURSE PRACTITIONER

## 2022-06-16 NOTE — PROGRESS NOTES
[facility]  [address]  [address]  Tel: [phone]  Fax: [phone]      Date:  22     Name:  Bhaskar DORADO  :  1940  MRN:  [id number]     PCP:  Leanne Hauser MD    Chief Complaint   Patient presents with    Neuropathy     6m f/u       HISTORY OF PRESENT ILLNESS:  Patient presents today for neuropathy, he is taking the gabapentin as prescribed. He denies any side effects. R L3-L4, L4-L5 TFESI 2021 with Dr Guillaume Lob: it was very helpful. It was starting to bother him and was ready to reschedule another injection but was delayed due to the discovery of kidney cancer which ultimately required the kidney to be removed in May incidentally he notes his back and leg currently are feeling better. Exercises regularly, doing yoga, walking, recumbent bike, Pilates etc. he attends [employer]. He recently had multiple labs done, he is working on his diet in moderation, limited alcohol use. No tobacco use. EMG/NCS:   Decreased insertional activity in of the left abductor hallucis and left medial gastrocnemius. The left anterior tibialis MUAPs displayed reduced recruitment and increased amplitude. Fasiculations were seen in the anterior tibialis bilaterally. Thoracic paraspinals were sampled and showed no fasciculations.      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 REVIEW OF SYSTEMS:     Review of Systems   HENT: Negative. Eyes: Negative. Gastrointestinal: Negative. Genitourinary: Negative. Musculoskeletal: Positive for back pain and joint pain. Negative for falls (i'm careful, a little wobbly at times. uses a walking stick). Neurological: Positive for tingling, sensory change (from the knees down they are numb, but he can still feel them.) and weakness (some heaviness, R leg is a little weaker).  Negative for dizziness, tremors, seizures, loss of consciousness and headaches. Psychiatric/Behavioral: Negative. Negative for memory loss. The patient does not have insomnia. All other systems reviewed and are negative. Current Outpatient Medications   Medication Sig    apixaban (ELIQUIS) 5 mg tablet Take 1 Tablet by mouth every twelve (12) hours for 88 days. Indications: a clot in the lung    guaiFENesin ER (MUCINEX) 600 mg ER tablet Take 1 Tablet by mouth every twelve (12) hours for 20 days.  gabapentin (Neurontin) 600 mg tablet 1 po bid AND 2 po qhs (Patient taking differently: 600 mg two (2) times a day. 1 po bid AND 2 po qhs)    allopurinoL (ZYLOPRIM) 100 mg tablet Take 1 tablet by mouth daily (Patient taking differently: Take 100 mg by mouth daily. Take 1 tablet by mouth daily)    atorvastatin (LIPITOR) 80 mg tablet Take 1 tablet by mouth daily    isosorbide mononitrate ER (IMDUR) 60 mg CR tablet Take 1 Tablet by mouth daily.  losartan (COZAAR) 50 mg tablet Take 1 Tablet by mouth daily.  cholecalciferol, vitamin D3, (Vitamin D3) 50 mcg (2,000 unit) tab Take 2,000 Units by mouth.  b complex vitamins tablet Take 1 Tablet by mouth daily.  multivitamin (ONE A DAY) tablet Take 1 Tab by mouth daily.  omega 3-dha-epa-fish oil (FISH OIL) 100-160-1,000 mg cap Take  by mouth two (2) times a day. 2 tabs bid    acetaminophen (Tylenol Extra Strength) 500 mg tablet Take 1,000 mg by mouth every six (6) hours as needed for Pain. (Patient not taking: Reported on 6/16/2022)     No current facility-administered medications for this visit.      No Known Allergies  Past Medical History:   Diagnosis Date    Adverse effect of anesthesia     hallucinations/agitation after last surgery 2018 in hospital 3 days    Arthritis     knee and hip/right    CAD (coronary artery disease) 2001    Cancer ([facility] [address]     Kidney, left side    Chronic kidney disease     left kidney tumor    Hypercholesteremia     Hypertension  Nausea & vomiting     Sleep apnea     Borderline/ No CPAP     Past Surgical History:   Procedure Laterality Date    COLONOSCOPY N/A 6/11/2020    COLONOSCOPY performed by Ishmael Sam[facility]go [facility] at [facility]  6/11/2020         HX APPENDECTOMY      HX CORONARY ARTERY BYPASS GRAFT  2001    5v    HX GI      umbilical hernia x3    HX HIP REPLACEMENT Left 2017    HX KNEE ARTHROSCOPY Right 2007    right knee    GA CARDIAC SURG PROCEDURE UNLIST  03/2001    Bypass    GA CARDIAC SURG PROCEDURE UNLIST  2008    stents    GA TOTAL KNEE ARTHROPLASTY Left 2017    left hip replacement    UPPER GI ENDOSCOPY,BIOPSY  6/11/2020          Social History     Socioeconomic History    Marital status:      Spouse name: Not on file    Number of children: Not on file    Years of education: Not on file    Highest education level: Not on file   Occupational History    Not on file   Tobacco Use    Smoking status: Never Smoker    Smokeless tobacco: Never Used   Vaping Use    Vaping Use: Never used   Substance and Sexual Activity    Alcohol use: Yes     Alcohol/week: 3.0 standard drinks     Types: 3 Cans of beer per week     Comment: Daily 1-2 drinks    Drug use: Never    Sexual activity: Not Currently   Other Topics Concern    Not on file   Social History Narrative    Not on file     Social Determinants of Health     Financial Resource Strain:     Difficulty of Paying Living Expenses: Not on file   Food Insecurity:     Worried About Running Out of Food in the Last Year: Not on file    Keith of Food in the Last Year: Not on file   Transportation Needs:     Lack of Transportation (Medical): Not on file    Lack of Transportation (Non-Medical):  Not on file   Physical Activity:     Days of Exercise per Week: Not on file    Minutes of Exercise per Session: Not on file   Stress:     Feeling of Stress : Not on file   Social Connections:     Frequency of Communication with Friends and Family: Not on file    Frequency of Social Gatherings with Friends and Family: Not on file    Attends Alevism Services: Not on file    Active Member of Clubs or Organizations: Not on file    Attends Club or Organization Meetings: Not on file    Marital Status: Not on file   Intimate Partner Violence:     Fear of Current or Ex-Partner: Not on file    Emotionally Abused: Not on file    Physically Abused: Not on file    Sexually Abused: Not on file   Housing Stability:     Unable to Pay for Housing in the Last Year: Not on file    Number of [place] in the Last Year: Not on file    Unstable Housing in the Last Year: Not on file     Family History   Problem Relation Age of Onset    Cancer Mother         LUNG    Diabetes Mother     COPD Mother     No Known Problems Father     Diabetes Maternal Grandmother     Other Daughter         [facility]         PHYSICAL EXAMINATION:    Visit Vitals  /68 (BP 1 Location: Left arm, BP Patient Position: Sitting, BP Cuff Size: Small adult)   Pulse 81   Temp 97.5 °F (36.4 °C) (Temporal)   Resp 18   Ht 5' 9.5\" (1.765 m)   Wt 190 lb 12.8 oz (86.5 kg)   SpO2 97%   BMI 27.77 kg/m²       General:  Well defined, nourished, and well groomed individual in no acute distress. Musculoskeletal: Left hip bursa slightly tender. Negative straight leg raise bilateral  Psych:  Good mood and bright affect    NEUROLOGICAL EXAMINATION:     Mental Status:   Alert and oriented to person, place, and time with recent and remote memory intact. Attention span and concentration are normal. Clear speech. Fund of knowledge preserved. Cranial Nerves:   PERRLA. Visual fields were full  EOM: no evidence of nystagmus  Facial sensation:  normal and symmetric  Facial motor: normal and symmetric, no facial droop noted. Hearing intact  SCM strength intact  Tongue: midline without fasciculations    Motor Examination: Normal tone.  5/5 muscle strength in bilateral upper and lower extremities. No muscle wasting, mild fasciculations noted. Sensory exam:  Normal throughout to light touch in bilateral upper extremities. Decreased sensation to light touch, vibration as well as sharp touch from right shin down to his right toes and from the left knee down to his left toes. Coordination:   Finger to nose and rapid arm movement testing was normal.  No resting or intention tremor. Negative Romberg, negative pronator drift. Gait and Station:  Steady but patient had difficulty with tandem walking. Normal arm swing. Reflexes:  DTRs 1+ in bilateral biceps, brachioradialis, patella and ankle. No clonus noted. ASSESSMENT AND PLAN      ICD-10-CM ICD-9-CM    1. Diabetic peripheral neuropathy associated with type 2 diabetes mellitus ([facility])  E11.42 250.60      357.2    2. Lumbar radiculopathy, chronic  M54.16 724.4    3. Greater trochanteric bursitis, left  M70.62 726.5      1. Diabetic peripheral neuropathy associated with type 2 diabetes mellitus ([facility]): Discussed healthy lifestyle to include limiting carbs and sugars, follow-up with primary care for ongoing management of hemoglobin A1c and relatable labs. Patient is well controlled and compliant with gabapentin, continue as prescribed. He denies any side effects. Patient is to notify us for any worsening or if he has any balance or falls at which time we could try some physical therapy otherwise patient is very active. 2. Lumbar radiculopathy, chronic: Stable at this time, he has previously had epidural injections with Dr. Lisa Choi, he is to schedule follow-up with him as needed for repeat injections otherwise continue regular exercise program and gabapentin as prescribed. 3. Greater trochanteric bursitis, left: Advised patient to call Dr. Alejandro Felton for work in appointment for steroid injection if worsening. Continue home exercise and stretching program, ice as well as Voltaren gel.       Patient and/or family verbalized understand of all instructions and all questions/concerns were addressed. Safety/side effects of medications discussed. Patient is to follow-up in 6 months, sooner if needed.     Ray Elizabeth, LAP-BC

## 2022-06-16 NOTE — PROGRESS NOTES
Chief Complaint   Patient presents with    Neuropathy     6m f/u       1. Have you been to the ER, urgent care clinic since your last visit? Hospitalized since your last visit? No    2. Have you seen or consulted any other health care providers outside of the [address] [place] [address] since your last visit? Include any pap smears or colon screening. Left Kidney removed Dr. Nitza Arce. Pt states his left kidney was removed and it was cancer. Pt c/o worsening neuropathy bilateral lower legs, that are numb knee down.

## 2022-06-22 ENCOUNTER — PATIENT OUTREACH (OUTPATIENT)
Dept: CASE MANAGEMENT | Age: 82
End: 2022-06-22

## 2022-06-23 ENCOUNTER — PATIENT OUTREACH (OUTPATIENT)
Dept: CASE MANAGEMENT | Age: 82
End: 2022-06-23

## 2022-06-23 NOTE — PROGRESS NOTES
Incoming call received from patient  Reports he is doing ok  Yonny Samaniego to resume his exercise regimen  States he will do some home exercises first before returning to the gym  Endorses walking  Attended follow up appts with pcp, cards, urology    Goals      Prevent complications post hospitalization.         05/31/22   Will not fall   Will monitor procedure site for evidence of infection   Will monitor for bleeding   Will monitor for s/s DVT/PE i.e calf pain/swelling, increased SOB   Will attend follow up appt with Dr. Antonio Davis scheduled 5/31 and Dr. Buster Peabody scheduled 6/7   Pt will remain out of the hospital or ER for remainder of KEN period    06/23/22   No falls   Denies bleeding   Attended follow up appts

## 2022-06-28 ENCOUNTER — PATIENT OUTREACH (OUTPATIENT)
Dept: CASE MANAGEMENT | Age: 82
End: 2022-06-28

## 2022-06-28 NOTE — PROGRESS NOTES
Patient has graduated from the Transitions of Care Coordination  program on 6/28/2022. Patient/family has the ability to self-manage at this time Care management goals have been completed. Patient was not referred to the [facility] team for further management. Goals Addressed                 This Visit's Progress     COMPLETED: Prevent complications post hospitalization. 05/31/22   Will not fall   Will monitor procedure site for evidence of infection   Will monitor for bleeding   Will monitor for s/s DVT/PE i.e calf pain/swelling, increased SOB   Will attend follow up appt with Dr. Sulema Perry scheduled 5/31 and Dr. Wanda Osgood scheduled 6/7   Pt will remain out of the hospital or ER for remainder of KEN period    06/23/22   No falls   Denies bleeding   Attended follow up appts            Patient has Care Transition Nurse's contact information for any further questions, concerns, or needs.   Patients upcoming visits:    Future Appointments   Date Time Provider Ramya Kang   12/13/2022 10:00 AM Orlando Way MD [facility] BS AMB   12/13/2022 11:30 AM Kang Boles NP [facility]UM BS AMB

## 2022-07-15 ENCOUNTER — TELEPHONE (OUTPATIENT)
Dept: INTERNAL MEDICINE CLINIC | Age: 82
End: 2022-07-15

## 2022-07-18 ENCOUNTER — DOCUMENTATION ONLY (OUTPATIENT)
Dept: INTERNAL MEDICINE CLINIC | Age: 82
End: 2022-07-18

## 2022-07-18 NOTE — PROGRESS NOTES
Called  Pt--advised to take eliquis for total of 6 months from May 2022. Refilled eliquis.  Pt reports tolerating without bleeding or brusing

## 2022-07-27 DIAGNOSIS — E53.8 VITAMIN B12 DEFICIENCY: ICD-10-CM

## 2022-07-27 DIAGNOSIS — R52 PAIN: ICD-10-CM

## 2022-07-27 DIAGNOSIS — D89.89 IMMUNE-MEDIATED NEUROPATHY (HCC): ICD-10-CM

## 2022-07-27 DIAGNOSIS — G63 IMMUNE-MEDIATED NEUROPATHY (HCC): ICD-10-CM

## 2022-07-27 DIAGNOSIS — E11.42 DIABETIC PERIPHERAL NEUROPATHY ASSOCIATED WITH TYPE 2 DIABETES MELLITUS (HCC): ICD-10-CM

## 2022-07-27 DIAGNOSIS — E55.9 VITAMIN D DEFICIENCY: ICD-10-CM

## 2022-07-27 DIAGNOSIS — M48.061 DEGENERATIVE LUMBAR SPINAL STENOSIS: ICD-10-CM

## 2022-07-27 DIAGNOSIS — M54.16 LUMBAR BACK PAIN WITH RADICULOPATHY AFFECTING RIGHT LOWER EXTREMITY: ICD-10-CM

## 2022-07-27 DIAGNOSIS — G60.9 IDIOPATHIC PERIPHERAL NEUROPATHY: ICD-10-CM

## 2022-07-28 RX ORDER — GABAPENTIN 600 MG/1
TABLET ORAL
Qty: 360 TABLET | Refills: 3 | Status: SHIPPED | OUTPATIENT
Start: 2022-07-28

## 2022-08-11 ENCOUNTER — DOCUMENTATION ONLY (OUTPATIENT)
Dept: INTERNAL MEDICINE CLINIC | Age: 82
End: 2022-08-11

## 2022-08-11 ENCOUNTER — TELEPHONE (OUTPATIENT)
Dept: INTERNAL MEDICINE CLINIC | Age: 82
End: 2022-08-11

## 2022-08-11 RX ORDER — NIRMATRELVIR AND RITONAVIR 150-100 MG
1 KIT ORAL 2 TIMES DAILY
Qty: 1 BOX | Refills: 0 | Status: SHIPPED | OUTPATIENT
Start: 2022-08-11

## 2022-08-11 RX ORDER — NIRMATRELVIR AND RITONAVIR 300-100 MG
KIT ORAL
Qty: 1 BOX | Refills: 0 | Status: SHIPPED | OUTPATIENT
Start: 2022-08-11 | End: 2022-08-11 | Stop reason: DRUGHIGH

## 2022-08-11 NOTE — TELEPHONE ENCOUNTER
Received call from patient - and pharmacy refusing to dispense paxlovid due to  decreased renal function. Camilo Gibson can you assist with this by calling pharmacy and explaining to pharmacist what do to. Pharmacist stating due to pre package it is not possible.    Patient expecting call in the morning   Thanks

## 2022-08-11 NOTE — TELEPHONE ENCOUNTER
Pharmacy Progress Note - Medication Access    Contacted [facility] regarding Mr. Gar Dial 80 y.o. 's Paxlovid    Pharmacist reports TAD direction does not enable for dosage adjustment with provided GFR 51. Request for renally adjusted dose prescription to be submitted. Will send rx. Per [facility], patient should be able to  rx today. Contacted patient to share update. Discuss with patient's wife. PHI verified. Recommend holding atorvastatin 80 mg x 7 days while completing Paxlovid therapy. All questions answered at this time. Medications Discontinued During This Encounter   Medication Reason    nirmatrelvir-ritonavir (Paxlovid, EUA,) 300 mg (150 mg x 2)-100 mg tablet DOSE ADJUSTMENT     Orders Placed This Encounter    nirmatrelvir-ritonavir (Paxlovid, EUA,) 150-100 mg DsPk     Sig: Take 1 Tablet by mouth two (2) times a day. 150/100 mg twice daily for 5 days. Renal adjusted dose for GFR 51.      Dispense:  1 Box     Refill:  0       Thank you for the consult,  Radha Dunne, PharmD, BCACP, [address] in [address]: Yes  Recommendation Provided To: Provider: 3 via Note to Provider , Patient/Caregiver: 3 via Telephone, and Pharmacy: 3  Intervention Detail: Discontinued Rx: 1, reason: Duplicate Therapy, Dose Adjustment: 1, reason: Renal Adjustment, and New Rx: 1, reason: Needs Additional Therapy  Gap Closed?:   Intervention Accepted By: Provider: 3, Patient/Caregiver: 3, and Pharmacy: 3  Time Spent (min): 15

## 2022-08-16 ENCOUNTER — TELEPHONE (OUTPATIENT)
Dept: INTERNAL MEDICINE CLINIC | Age: 82
End: 2022-08-16

## 2022-08-16 RX ORDER — METRONIDAZOLE 10 MG/G
GEL TOPICAL DAILY
Qty: 45 G | Refills: 0 | Status: SHIPPED | OUTPATIENT
Start: 2022-08-16

## 2022-11-21 ENCOUNTER — OFFICE VISIT (OUTPATIENT)
Dept: INTERNAL MEDICINE CLINIC | Age: 82
End: 2022-11-21
Payer: MEDICARE

## 2022-11-21 VITALS
HEIGHT: 70 IN | WEIGHT: 195.6 LBS | RESPIRATION RATE: 18 BRPM | HEART RATE: 77 BPM | BODY MASS INDEX: 28 KG/M2 | TEMPERATURE: 98.4 F | OXYGEN SATURATION: 97 % | SYSTOLIC BLOOD PRESSURE: 126 MMHG | DIASTOLIC BLOOD PRESSURE: 70 MMHG

## 2022-11-21 DIAGNOSIS — R30.0 DYSURIA: ICD-10-CM

## 2022-11-21 DIAGNOSIS — G62.9 NEUROPATHY: ICD-10-CM

## 2022-11-21 DIAGNOSIS — R52 PAIN: ICD-10-CM

## 2022-11-21 DIAGNOSIS — I10 HYPERTENSION, UNSPECIFIED TYPE: Primary | ICD-10-CM

## 2022-11-21 DIAGNOSIS — N18.30 STAGE 3 CHRONIC KIDNEY DISEASE, UNSPECIFIED WHETHER STAGE 3A OR 3B CKD (HCC): ICD-10-CM

## 2022-11-21 DIAGNOSIS — I26.99 OTHER PULMONARY EMBOLISM WITHOUT ACUTE COR PULMONALE, UNSPECIFIED CHRONICITY (HCC): ICD-10-CM

## 2022-11-21 DIAGNOSIS — E78.5 HYPERLIPIDEMIA, UNSPECIFIED HYPERLIPIDEMIA TYPE: ICD-10-CM

## 2022-11-21 DIAGNOSIS — E11.40 TYPE 2 DIABETES MELLITUS WITH DIABETIC NEUROPATHY, WITHOUT LONG-TERM CURRENT USE OF INSULIN (HCC): ICD-10-CM

## 2022-11-21 PROBLEM — E11.22 TYPE 2 DIABETES MELLITUS WITH CHRONIC KIDNEY DISEASE (HCC): Status: ACTIVE | Noted: 2022-11-21

## 2022-11-21 LAB
BILIRUB UR QL STRIP: NEGATIVE
GLUCOSE UR-MCNC: NEGATIVE MG/DL
KETONES P FAST UR STRIP-MCNC: NEGATIVE MG/DL
PH UR STRIP: 5 [PH] (ref 4.6–8)
PROT UR QL STRIP: NEGATIVE
SP GR UR STRIP: 1.02 (ref 1–1.03)
UA UROBILINOGEN AMB POC: NORMAL (ref 0.2–1)
URINALYSIS CLARITY POC: CLEAR
URINALYSIS COLOR POC: YELLOW
URINE BLOOD POC: NORMAL
URINE LEUKOCYTES POC: NEGATIVE
URINE NITRITES POC: NEGATIVE

## 2022-11-21 PROCEDURE — G8752 SYS BP LESS 140: HCPCS | Performed by: INTERNAL MEDICINE

## 2022-11-21 PROCEDURE — G8427 DOCREV CUR MEDS BY ELIG CLIN: HCPCS | Performed by: INTERNAL MEDICINE

## 2022-11-21 PROCEDURE — 81001 URINALYSIS AUTO W/SCOPE: CPT | Performed by: INTERNAL MEDICINE

## 2022-11-21 PROCEDURE — G8754 DIAS BP LESS 90: HCPCS | Performed by: INTERNAL MEDICINE

## 2022-11-21 PROCEDURE — G8417 CALC BMI ABV UP PARAM F/U: HCPCS | Performed by: INTERNAL MEDICINE

## 2022-11-21 PROCEDURE — 99214 OFFICE O/P EST MOD 30 MIN: CPT | Performed by: INTERNAL MEDICINE

## 2022-11-21 PROCEDURE — G8510 SCR DEP NEG, NO PLAN REQD: HCPCS | Performed by: INTERNAL MEDICINE

## 2022-11-21 PROCEDURE — 1101F PT FALLS ASSESS-DOCD LE1/YR: CPT | Performed by: INTERNAL MEDICINE

## 2022-11-21 PROCEDURE — G8536 NO DOC ELDER MAL SCRN: HCPCS | Performed by: INTERNAL MEDICINE

## 2022-11-21 RX ORDER — LORAZEPAM 1 MG/1
TABLET ORAL
COMMUNITY
Start: 2022-08-18

## 2022-11-21 NOTE — PROGRESS NOTES
Rm    Chief Complaint   Patient presents with    Urinary Pain     Started 3 or 4 weeks        Visit Vitals  BP (!) 153/77 (BP 1 Location: Left upper arm, BP Patient Position: Sitting, BP Cuff Size: Adult)   Pulse 77   Temp 98.4 °F (36.9 °C) (Temporal)   Resp 18   Ht 5' 9.5\" (1.765 m)   Wt 195 lb 9.6 oz (88.7 kg)   SpO2 97%   BMI 28.47 kg/m²        1. Have you been to the ER, urgent care clinic since your last visit? Hospitalized since your last visit? No    2. Have you seen or consulted any other health care providers outside of the [address] [place] [address] since your last visit? Include any pap smears or colon screening. No     Health Maintenance Due   Topic Date Due    Eye Exam Retinal or Dilated  Never done    Foot Exam Q1  11/26/2020    COVID-19 Vaccine (4 - Booster for Moderna series) 12/20/2021    DTaP/Tdap/Td series (2 - Td or Tdap) 06/13/2022    Flu Vaccine (1) 08/01/2022        3 most recent PHQ Screens 11/21/2022   Little interest or pleasure in doing things Not at all   Feeling down, depressed, irritable, or hopeless Not at all   Total Score PHQ 2 0        Fall Risk Assessment, last 12 mths 6/7/2022   Able to walk? Yes   Fall in past 12 months? 0   Do you feel unsteady? 0   Are you worried about falling 0       No flowsheet data found.

## 2022-11-21 NOTE — PROGRESS NOTES
HISTORY OF PRESENT ILLNESS  Cielo Durand is a 80 y.o. male. 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  No hx BPH or OAB  Last a1c 6.3-no fsbs checks  Has ongoing neuropathy in feet that bothers him at niught on neurontin 2400mg every day-no problems during the day  Some right sciatica too      Last OV  Saw Neurologist Dr Pattricia Ganser right lower lumbar radiculopathy and polyneuropathy on neurontin     S/p left nephrourectomy for cancer of left renal pelvis-Dr Ishmael Jewell 2022  Post op hypoxia--VQ intermediate probability for PE---on eliquis tolerating well  No cp or sob  Recovering well from left nephrectomy has fu URO in 2 weeks     Had recent OV Dr Sol Farrar was cleared for kidney stable-no cp or angina  reports numbness in legs is getting worse-has fu neurologist    Patient Active Problem List    Diagnosis Date Noted    Chronic renal disease, stage III 06/07/2022    SOB (shortness of breath) 05/27/2022    Pulmonary embolism ([address] 05/27/2022    Cancer of left renal pelvis ([address] 05/25/2022    Diabetic peripheral neuropathy associated with type 2 diabetes mellitus ([address] 12/14/2021    Immune-mediated neuropathy ([address] 12/14/2021    Lumbar back pain with radiculopathy affecting right lower extremity 12/14/2021    Degenerative lumbar spinal stenosis 12/14/2021    Vitamin B12 deficiency 12/14/2021    Vitamin D deficiency 12/14/2021    Diverticulosis 06/29/2019    Status post right hip replacement 06/13/2019    HTN (hypertension) 05/28/2019    Pure hypercholesterolemia 05/28/2019    Hx of gout 05/28/2019    Hx of colonic polyps 05/28/2019    Coronary artery disease involving native coronary artery of native heart without angina pectoris 05/28/2019 Rosacea 05/28/2019    Idiopathic peripheral neuropathy 05/28/2019     Current Outpatient Medications   Medication Sig Dispense Refill    apixaban (Eliquis) 5 mg tablet TAKE 1 TABLET BY MOUTH EVERY TWELVE (12) HOURS FOR 88 DAYS. INDICATIONS: A CLOT IN THE LUNG      metroNIDAZOLE (METROGEL) 1 % topical gel Apply  to affected area daily. Use a thin layer to affected areas after washing 45 g 0    nirmatrelvir-ritonavir (Paxlovid, EUA,) 150-100 mg DsPk Take 1 Tablet by mouth two (2) times a day. 150/100 mg twice daily for 5 days. Renal adjusted dose for GFR 51. 1 Box 0    gabapentin (NEURONTIN) 600 mg tablet TAKE 1 TABLET BY MOUTH  TWICE DAILY AND 2 TABLETS  EVERY NIGHT AT BEDTIME 360 Tablet 3    allopurinoL (ZYLOPRIM) 100 mg tablet Take 1 tablet by mouth daily (Patient taking differently: Take 100 mg by mouth daily. Take 1 tablet by mouth daily) 90 Tablet 3    atorvastatin (LIPITOR) 80 mg tablet Take 1 tablet by mouth daily 90 Tablet 3    isosorbide mononitrate ER (IMDUR) 60 mg CR tablet Take 1 Tablet by mouth daily. 90 Tablet 3    losartan (COZAAR) 50 mg tablet Take 1 Tablet by mouth daily. 90 Tablet 3    cholecalciferol, vitamin D3, 50 mcg (2,000 unit) tab Take 2,000 Units by mouth.      b complex vitamins tablet Take 1 Tablet by mouth daily. multivitamin (ONE A DAY) tablet Take 1 Tab by mouth daily. omega 3-dha-epa-fish oil 100-160-1,000 mg cap Take  by mouth two (2) times a day. 2 tabs bid      LORazepam (ATIVAN) 1 mg tablet TAKE 1 TABLET BY MOUTH 45 MINUTES PRIOR TO INJECTION, REPEAT IF NEEDED      acetaminophen (TYLENOL) 500 mg tablet Take 1,000 mg by mouth every six (6) hours as needed for Pain.  (Patient not taking: No sig reported)       No Known Allergies   Lab Results   Component Value Date/Time    WBC 11.4 (H) 05/27/2022 03:10 PM    HGB 12.9 05/28/2022 04:38 AM    HCT 38.2 05/28/2022 04:38 AM    PLATELET 241 83/36/8658 03:10 PM    MCV 91.9 05/27/2022 03:10 PM     Lab Results   Component Value Date/Time    GFR est non-AA 51 (L) 05/28/2022 04:38 AM    GFRNA, POC >60 04/24/2020 01:47 PM    GFR est AA >60 05/28/2022 04:38 AM    GFRAA, POC >60 04/24/2020 01:47 PM    Creatinine 1.33 (H) 05/28/2022 04:38 AM    Creatinine (POC) 1.0 04/24/2020 01:47 PM    BUN 19 05/28/2022 04:38 AM    Sodium 138 05/28/2022 04:38 AM    Potassium 4.0 05/28/2022 04:38 AM    Chloride 106 05/28/2022 04:38 AM    CO2 29 05/28/2022 04:38 AM        ROS    Physical Exam  Vitals and nursing note reviewed. Constitutional:       General: He is not in acute distress. Appearance: He is well-developed. Comments: Appears stated age   HENT:      Head: Normocephalic. Cardiovascular:      Rate and Rhythm: Normal rate and regular rhythm. Heart sounds: Normal heart sounds. Pulmonary:      Effort: Pulmonary effort is normal.      Breath sounds: Normal breath sounds. Abdominal:      Palpations: Abdomen is soft. Neurological:      Mental Status: He is alert. ASSESSMENT and PLAN    ICD-10-CM ICD-9-CM    1. Hypertension, unspecified type  I10 401.9 AMB POC URINALYSIS DIP STICK AUTO W/ MICRO-negative wbc , nit      2. Stage 3 chronic kidney disease, unspecified whether stage 3a or 3b CKD [facility]  N02.23 152.3 METABOLIC PANEL, BASIC  S/p nephrectomy      METABOLIC PANEL, BASIC      3. Type 2 diabetes mellitus with diabetic neuropathy, without long-term current use of insulin ([facility])  E11.40 250.60 HEMOGLOBIN A1C WITH EAG  Diet controlled     357.2 HEMOGLOBIN A1C WITH EAG      4. Hyperlipidemia, unspecified hyperlipidemia type  E78.5 272.4       5. Other pulmonary embolism without acute cor pulmonale, unspecified chronicity ([facility])  I26.99 415.19 D/c eliquis-has taken about 6 months now      6. Dysuria  R30.0 788.1 Only occures when sitting to void       7. Neuropathy  G62.9 355.9 Fu Neurologist and  orthospine      8.  Pain  R52 780.96 AMB POC URINALYSIS DIP STICK AUTO W/ MICRO

## 2022-11-22 LAB
ANION GAP SERPL CALC-SCNC: 6 MMOL/L (ref 5–15)
BUN SERPL-MCNC: 23 MG/DL (ref 6–20)
BUN/CREAT SERPL: 15 (ref 12–20)
CALCIUM SERPL-MCNC: 9.2 MG/DL (ref 8.5–10.1)
CHLORIDE SERPL-SCNC: 105 MMOL/L (ref 97–108)
CO2 SERPL-SCNC: 29 MMOL/L (ref 21–32)
CREAT SERPL-MCNC: 1.55 MG/DL (ref 0.7–1.3)
EST. AVERAGE GLUCOSE BLD GHB EST-MCNC: 131 MG/DL
GLUCOSE SERPL-MCNC: 91 MG/DL (ref 65–100)
HBA1C MFR BLD: 6.2 % (ref 4–5.6)
POTASSIUM SERPL-SCNC: 4.7 MMOL/L (ref 3.5–5.1)
SODIUM SERPL-SCNC: 140 MMOL/L (ref 136–145)

## 2022-12-08 DIAGNOSIS — E78.00 PURE HYPERCHOLESTEROLEMIA: ICD-10-CM

## 2022-12-08 DIAGNOSIS — Z87.39 HX OF GOUT: ICD-10-CM

## 2022-12-09 RX ORDER — ALLOPURINOL 100 MG/1
TABLET ORAL
Qty: 90 TABLET | Refills: 3 | Status: SHIPPED | OUTPATIENT
Start: 2022-12-09

## 2022-12-09 RX ORDER — ATORVASTATIN CALCIUM 80 MG/1
TABLET, FILM COATED ORAL
Qty: 90 TABLET | Refills: 3 | Status: SHIPPED | OUTPATIENT
Start: 2022-12-09

## 2022-12-09 RX ORDER — LOSARTAN POTASSIUM 50 MG/1
50 TABLET ORAL DAILY
Qty: 90 TABLET | Refills: 3 | Status: SHIPPED | OUTPATIENT
Start: 2022-12-09

## 2022-12-13 ENCOUNTER — OFFICE VISIT (OUTPATIENT)
Dept: NEUROLOGY | Age: 82
End: 2022-12-13
Payer: MEDICARE

## 2022-12-13 VITALS
OXYGEN SATURATION: 94 % | RESPIRATION RATE: 16 BRPM | HEIGHT: 69 IN | HEART RATE: 94 BPM | TEMPERATURE: 98 F | DIASTOLIC BLOOD PRESSURE: 70 MMHG | SYSTOLIC BLOOD PRESSURE: 120 MMHG | WEIGHT: 196.6 LBS | BODY MASS INDEX: 29.12 KG/M2

## 2022-12-13 DIAGNOSIS — R41.89 DIFFICULTY PROCESSING INFORMATION: ICD-10-CM

## 2022-12-13 DIAGNOSIS — M62.838 MUSCLE SPASMS OF BOTH LOWER EXTREMITIES: Primary | ICD-10-CM

## 2022-12-13 DIAGNOSIS — E11.42 DIABETIC PERIPHERAL NEUROPATHY ASSOCIATED WITH TYPE 2 DIABETES MELLITUS (HCC): ICD-10-CM

## 2022-12-13 DIAGNOSIS — M54.16 LUMBAR BACK PAIN WITH RADICULOPATHY AFFECTING RIGHT LOWER EXTREMITY: ICD-10-CM

## 2022-12-13 PROCEDURE — 3078F DIAST BP <80 MM HG: CPT | Performed by: NURSE PRACTITIONER

## 2022-12-13 PROCEDURE — 3074F SYST BP LT 130 MM HG: CPT | Performed by: NURSE PRACTITIONER

## 2022-12-13 PROCEDURE — 3044F HG A1C LEVEL LT 7.0%: CPT | Performed by: NURSE PRACTITIONER

## 2022-12-13 PROCEDURE — 99214 OFFICE O/P EST MOD 30 MIN: CPT | Performed by: NURSE PRACTITIONER

## 2022-12-13 PROCEDURE — 1123F ACP DISCUSS/DSCN MKR DOCD: CPT | Performed by: NURSE PRACTITIONER

## 2022-12-13 RX ORDER — PHENAZOPYRIDINE HYDROCHLORIDE 200 MG/1
TABLET, FILM COATED ORAL
COMMUNITY
Start: 2022-12-01

## 2022-12-13 RX ORDER — TIZANIDINE 2 MG/1
2 TABLET ORAL EVERY EVENING
Qty: 30 TABLET | Refills: 3 | Status: SHIPPED | OUTPATIENT
Start: 2022-12-13

## 2022-12-13 NOTE — PROGRESS NOTES
3 [facility]  [facility] [address]  Kell Alvarez  Tel: [phone]  Fax: [phone]      Date:  22     Name:  Darío DORADO  :  1940  MRN:  [id number]     PCP:  Paula Nicole MD    Chief Complaint   Patient presents with    Follow-up     Diabetic peripheral neuropathy associated with type 2 diabetes mellitus       HISTORY OF PRESENT ILLNESS:  Patient presents today for 6 month follow up. C/o increased pain, severe in B thighs down his shins to his feet, (R>L). It will wake him at night. Sharp pains. Also has a lot of cramping in thigh down to his feet. Exercises a lot. He eats fine, wife prepares meals, tries to drink water but limited b/c he is never thirsty. Coffee in the morning, occassionally will have a beer/wine in the evenings. Gabapentin 600mg BID and 1200mg QHS: No side effects, taking as prescribed. TFESI: at [facility] has helped the back. Unsure if it helps his legs at all  Another concern he would like to bring up today is that he has had trouble processing things, gradual decline per his wife. Not forgetting things or losing things. Pt gets very frustrated with it. Recap from last visit 2022: 1. Diabetic peripheral neuropathy associated with type 2 diabetes mellitus ([facility] [address]): Discussed healthy lifestyle to include limiting carbs and sugars, follow-up with primary care for ongoing management of hemoglobin A1c and relatable labs. Patient is well controlled and compliant with gabapentin, continue as prescribed. He denies any side effects. Patient is to notify us for any worsening or if he has any balance or falls at which time we could try some physical therapy otherwise patient is very active.   2. Lumbar radiculopathy, chronic: Stable at this time, he has previously had epidural injections with Dr. Katerine Oviedo, he is to schedule follow-up with him as needed for repeat injections otherwise continue regular exercise program and gabapentin as prescribed. 3. Greater trochanteric bursitis, left: Advised patient to call Dr. Prashant Pagan for work in appointment for steroid injection if worsening. Continue home exercise and stretching program, ice as well as Voltaren gel. REVIEW OF SYSTEMS:     Review of Systems   Musculoskeletal:  Positive for back pain and myalgias. Negative for falls. Psychiatric/Behavioral:  Positive for memory loss (trouble processing things/tasks, a gradual change). The patient has insomnia (pain wakes him up.). All other systems reviewed and are negative. Current Outpatient Medications   Medication Sig    phenazopyridine (PYRIDIUM) 200 mg tablet TAKE 1 TABLET BY MOUTH THREE TIMES A DAY AS NEEDED    tiZANidine (ZANAFLEX) 2 mg tablet Take 1 Tablet by mouth every evening. atorvastatin (LIPITOR) 80 mg tablet TAKE 1 TABLET BY MOUTH  DAILY    allopurinoL (ZYLOPRIM) 100 mg tablet TAKE 1 TABLET BY MOUTH  DAILY    losartan (COZAAR) 50 mg tablet TAKE 1 TABLET BY MOUTH  DAILY    LORazepam (ATIVAN) 1 mg tablet TAKE 1 TABLET BY MOUTH 45 MINUTES PRIOR TO INJECTION, REPEAT IF NEEDED    metroNIDAZOLE (METROGEL) 1 % topical gel Apply  to affected area daily. Use a thin layer to affected areas after washing    gabapentin (NEURONTIN) 600 mg tablet TAKE 1 TABLET BY MOUTH  TWICE DAILY AND 2 TABLETS  EVERY NIGHT AT BEDTIME    acetaminophen (TYLENOL) 500 mg tablet Take 1,000 mg by mouth as needed for Pain.    isosorbide mononitrate ER (IMDUR) 60 mg CR tablet Take 1 Tablet by mouth daily. cholecalciferol, vitamin D3, 50 mcg (2,000 unit) tab Take 2,000 Units by mouth.    b complex vitamins tablet Take 1 Tablet by mouth daily. multivitamin (ONE A DAY) tablet Take 1 Tab by mouth daily. omega 3-dha-epa-fish oil 100-160-1,000 mg cap Take  by mouth two (2) times a day. 2 tabs bid     No current facility-administered medications for this visit.      No Known Allergies  Past Medical History:   Diagnosis Date    Adverse effect of anesthesia     hallucinations/agitation after last surgery 2018 in hospital 3 days    Arthritis     knee and hip/right    CAD (coronary artery disease) 2001    Cancer ([facility] [address]     Kidney, left side    Chronic kidney disease     left kidney tumor    Hypercholesteremia     Hypertension     Nausea & vomiting     Sleep apnea     Borderline/ No CPAP     Past Surgical History:   Procedure Laterality Date    COLONOSCOPY N/A 6/11/2020    COLONOSCOPY performed by Ann-Marie Hoff MD at [address]  6/11/2020         HX APPENDECTOMY      HX CORONARY ARTERY BYPASS GRAFT  2001    5v    HX GI      umbilical hernia x3    HX HIP REPLACEMENT Left 2017    HX KNEE ARTHROSCOPY Right 2007    right knee    [place] CARDIAC SURG PROCEDURE UNLIST  03/2001    Bypass    [place] CARDIAC SUR[place] PROCEDURE UNLIST  2008    stents    [place] TOTAL KNEE ARTHROPLASTY Left 2017    left hip replacement    UPPER GI ENDOSCOPY,BIOPSY  6/11/2020          Social History     Socioeconomic History    Marital status:      Spouse name: Not on file    Number of children: Not on file    Years of education: Not on file    Highest education level: Not on file   Occupational History    Not on file   Tobacco Use    Smoking status: Never    Smokeless tobacco: Never   Vaping Use    Vaping Use: Never used   Substance and Sexual Activity    Alcohol use:  Yes     Alcohol/week: 3.0 standard drinks     Types: 3 Cans of beer per week     Comment: Daily 1-2 drinks    Drug use: Never    Sexual activity: Not Currently   Other Topics Concern    Not on file   Social History Narrative    Not on file     Social Determinants of Health     Financial Resource Strain: Not on file   Food Insecurity: Not on file   Transportation Needs: Not on file   Physical Activity: Not on file   Stress: Not on file   Social Connections: Not on file   Intimate Partner Violence: Not on file   Housing Stability: Not on file     Family History   Problem Relation Age of Onset    Cancer Mother LUNG    Diabetes Mother     COPD Mother     No Known Problems Father     Diabetes Maternal Grandmother     Other Daughter         [facility]         PHYSICAL EXAMINATION:    Visit Vitals  /70 (BP 1 Location: Left upper arm, BP Patient Position: Sitting, BP Cuff Size: Large adult)   Pulse 94   Temp 98 °F (36.7 °C) (Temporal)   Resp 16   Ht 5' 9\" (1.753 m)   Wt 196 lb 9.6 oz (89.2 kg)   SpO2 94%   BMI 29.03 kg/m²     General:  Well defined, nourished, and well groomed individual in no acute distress. Musculoskeletal:  Extremities revealed no edema and had full range of motion of joints. Negative Homans, no calf tenderness noted. Psych:  Good mood and bright affect, presents today with his wife. NEUROLOGICAL EXAMINATION:     Mental Status:   Alert and oriented to person, place, and time with recent and remote memory intact. Attention span and concentration are normal. Clear speech. Fund of knowledge preserved. Cranial Nerves: Grossly intact. Motor Examination: Normal tone. 5/5 muscle strength in bilateral lower extremities. No cogwheel rigidity. No muscle wasting, no twitching or fasciculation noted. Sensory exam:  Normal throughout to light touch in bilateral lower extremities. Gait and Station: Relatively steady gait. Reflexes:  DTRs 1+ in bilateral biceps, brachioradialis, patella. ASSESSMENT AND PLAN      ICD-10-CM ICD-9-CM    1. Muscle spasms of both lower extremities  M62.838 728.85 tiZANidine (ZANAFLEX) 2 mg tablet      2. Diabetic peripheral neuropathy associated with type 2 diabetes mellitus (HCC)  E11.42 250.60      357.2       3. Lumbar back pain with radiculopathy affecting right lower extremity  M54.16 724.4       4. Difficulty processing information  R41.89 799.59 REFERRAL TO NEUROPSYCHOLOGY        1. Muscle spasms of both lower extremities:  We will provide the patient with a low-dose prescription of tizanidine 2 mg he can try in the evening, caution advised as patient is already taking a large dose of gabapentin at night. Home exercise program and stretching encouraged, discussed patient could try some over-the-counter magnesium supplements as well. Patient recently did have labs done with Dr. Ann-Marie Zuniga as well as primary care. We discussed trying to continue to modify his diet as well as increased water intake. -     tiZANidine (ZANAFLEX) 2 mg tablet; Take 1 Tablet by mouth every evening., Normal, Disp-30 Tablet, R-3  2. Diabetic peripheral neuropathy associated with type 2 diabetes mellitus [facility]): Healthy lifestyle encouraged, continue with modifications to diet. Follow-up primary care for ongoing monitoring and maintenance. Patient is to continue gabapentin with no further changes at this time. 3. Lumbar back pain with radiculopathy affecting right lower extremity: Patient is seen periodically for epidural injections at [facility], he does note some improvement with the injections, repeat PRN. Patient is to continue regular exercise program.  Patient to try Tylenol at night as well to see if that helps with some of his leg pain. 4. Difficulty processing information: Neuropsych testing has been ordered, modify plan of care based on results.  -     REFERRAL TO NEUROPSYCHOLOGY    Patient and/or family verbalized understand of all instructions and all questions/concerns were addressed. Safety/side effects of medications discussed. Patient remains a complex patient secondary to polypharmacy, significant comorbid conditions, and use of multiple medications which complicate the decision making process related to patient's neurologic diagnosis. I will see the patient back in approximately 6 to 9 months after completion of neuropsych testing, encourage patient to utilize MyChart to let me know how the pain in his legs is doing with some recommended changes.   Arnoldo Pham, FLOR-BC

## 2022-12-13 NOTE — PROGRESS NOTES
Chief Complaint   Patient presents with    Follow-up     Diabetic peripheral neuropathy associated with type 2 diabetes mellitus     1. Have you been to the ER, urgent care clinic since your last visit? No Hospitalized since your last visit? No    2. Have you seen or consulted any other health care providers outside of the [address] [place] [address] since your last visit? Yes cardiologist ,Eye exam ,Dentist GI  & urologist Include any pap smears or colon screening. No    Patient C/O fatigue  has urology procedure (1/3/23) coming up due to multiple tumors in bladder with Dr Yudi Barron.

## 2023-01-04 DIAGNOSIS — M62.838 MUSCLE SPASMS OF BOTH LOWER EXTREMITIES: Primary | ICD-10-CM

## 2023-01-04 RX ORDER — TIZANIDINE 2 MG/1
TABLET ORAL
Qty: 60 TABLET | Refills: 2 | Status: SHIPPED | OUTPATIENT
Start: 2023-01-04

## 2023-01-13 DIAGNOSIS — M48.061 DEGENERATIVE LUMBAR SPINAL STENOSIS: ICD-10-CM

## 2023-01-13 DIAGNOSIS — E55.9 VITAMIN D DEFICIENCY: ICD-10-CM

## 2023-01-13 DIAGNOSIS — M54.16 LUMBAR BACK PAIN WITH RADICULOPATHY AFFECTING RIGHT LOWER EXTREMITY: ICD-10-CM

## 2023-01-13 DIAGNOSIS — R52 PAIN: ICD-10-CM

## 2023-01-13 DIAGNOSIS — E53.8 VITAMIN B12 DEFICIENCY: ICD-10-CM

## 2023-01-13 DIAGNOSIS — E11.42 DIABETIC PERIPHERAL NEUROPATHY ASSOCIATED WITH TYPE 2 DIABETES MELLITUS (HCC): ICD-10-CM

## 2023-01-13 DIAGNOSIS — G60.9 IDIOPATHIC PERIPHERAL NEUROPATHY: ICD-10-CM

## 2023-01-13 DIAGNOSIS — D89.89 IMMUNE-MEDIATED NEUROPATHY (HCC): ICD-10-CM

## 2023-01-13 DIAGNOSIS — G63 IMMUNE-MEDIATED NEUROPATHY (HCC): ICD-10-CM

## 2023-01-14 RX ORDER — GABAPENTIN 600 MG/1
TABLET ORAL
Qty: 360 TABLET | Refills: 3 | Status: SHIPPED | OUTPATIENT
Start: 2023-01-14

## 2023-02-18 RX ORDER — ISOSORBIDE MONONITRATE 60 MG/1
60 TABLET, EXTENDED RELEASE ORAL DAILY
Qty: 90 TABLET | Refills: 3 | Status: SHIPPED | OUTPATIENT
Start: 2023-02-18

## 2023-06-20 ENCOUNTER — OFFICE VISIT (OUTPATIENT)
Age: 83
End: 2023-06-20
Payer: COMMERCIAL

## 2023-06-20 VITALS
BODY MASS INDEX: 28.47 KG/M2 | HEART RATE: 67 BPM | TEMPERATURE: 97.5 F | HEIGHT: 69 IN | RESPIRATION RATE: 16 BRPM | WEIGHT: 192.2 LBS | SYSTOLIC BLOOD PRESSURE: 110 MMHG | DIASTOLIC BLOOD PRESSURE: 70 MMHG | OXYGEN SATURATION: 97 %

## 2023-06-20 DIAGNOSIS — E11.42 TYPE 2 DIABETES MELLITUS WITH DIABETIC POLYNEUROPATHY, WITHOUT LONG-TERM CURRENT USE OF INSULIN (HCC): ICD-10-CM

## 2023-06-20 DIAGNOSIS — N18.30 STAGE 3 CHRONIC KIDNEY DISEASE, UNSPECIFIED WHETHER STAGE 3A OR 3B CKD (HCC): ICD-10-CM

## 2023-06-20 DIAGNOSIS — C68.9 MALIGNANT NEOPLASM OF URINARY ORGAN, UNSPECIFIED (HCC): Primary | ICD-10-CM

## 2023-06-20 DIAGNOSIS — Z00.00 MEDICARE ANNUAL WELLNESS VISIT, SUBSEQUENT: ICD-10-CM

## 2023-06-20 DIAGNOSIS — E78.00 PURE HYPERCHOLESTEROLEMIA: ICD-10-CM

## 2023-06-20 DIAGNOSIS — I10 HYPERTENSION, UNSPECIFIED TYPE: ICD-10-CM

## 2023-06-20 LAB
ALBUMIN SERPL-MCNC: 3.6 G/DL (ref 3.5–5)
ALBUMIN/GLOB SERPL: 1 (ref 1.1–2.2)
ALP SERPL-CCNC: 85 U/L (ref 45–117)
ALT SERPL-CCNC: 32 U/L (ref 12–78)
ANION GAP SERPL CALC-SCNC: 5 MMOL/L (ref 5–15)
AST SERPL-CCNC: 22 U/L (ref 15–37)
BILIRUB SERPL-MCNC: 0.7 MG/DL (ref 0.2–1)
BUN SERPL-MCNC: 24 MG/DL (ref 6–20)
BUN/CREAT SERPL: 17 (ref 12–20)
CALCIUM SERPL-MCNC: 9.6 MG/DL (ref 8.5–10.1)
CHLORIDE SERPL-SCNC: 103 MMOL/L (ref 97–108)
CHOLEST SERPL-MCNC: 120 MG/DL
CO2 SERPL-SCNC: 28 MMOL/L (ref 21–32)
CREAT SERPL-MCNC: 1.45 MG/DL (ref 0.7–1.3)
CREAT UR-MCNC: 95.5 MG/DL
ERYTHROCYTE [DISTWIDTH] IN BLOOD BY AUTOMATED COUNT: 13.5 % (ref 11.5–14.5)
EST. AVERAGE GLUCOSE BLD GHB EST-MCNC: 120 MG/DL
GLOBULIN SER CALC-MCNC: 3.7 G/DL (ref 2–4)
GLUCOSE SERPL-MCNC: 127 MG/DL (ref 65–100)
HBA1C MFR BLD: 5.8 % (ref 4–5.6)
HCT VFR BLD AUTO: 44.6 % (ref 36.6–50.3)
HDLC SERPL-MCNC: 49 MG/DL
HDLC SERPL: 2.4 (ref 0–5)
HGB BLD-MCNC: 14.3 G/DL (ref 12.1–17)
LDLC SERPL CALC-MCNC: 45 MG/DL (ref 0–100)
MCH RBC QN AUTO: 29.8 PG (ref 26–34)
MCHC RBC AUTO-ENTMCNC: 32.1 G/DL (ref 30–36.5)
MCV RBC AUTO: 92.9 FL (ref 80–99)
MICROALBUMIN UR-MCNC: 0.69 MG/DL
MICROALBUMIN/CREAT UR-RTO: 7 MG/G (ref 0–30)
NRBC # BLD: 0 K/UL (ref 0–0.01)
NRBC BLD-RTO: 0 PER 100 WBC
PLATELET # BLD AUTO: 226 K/UL (ref 150–400)
PMV BLD AUTO: 10.7 FL (ref 8.9–12.9)
POTASSIUM SERPL-SCNC: 4.6 MMOL/L (ref 3.5–5.1)
PROT SERPL-MCNC: 7.3 G/DL (ref 6.4–8.2)
RBC # BLD AUTO: 4.8 M/UL (ref 4.1–5.7)
SODIUM SERPL-SCNC: 136 MMOL/L (ref 136–145)
TRIGL SERPL-MCNC: 130 MG/DL
VLDLC SERPL CALC-MCNC: 26 MG/DL
WBC # BLD AUTO: 9.6 K/UL (ref 4.1–11.1)

## 2023-06-20 PROCEDURE — 3074F SYST BP LT 130 MM HG: CPT | Performed by: INTERNAL MEDICINE

## 2023-06-20 PROCEDURE — G0439 PPPS, SUBSEQ VISIT: HCPCS | Performed by: INTERNAL MEDICINE

## 2023-06-20 PROCEDURE — 3078F DIAST BP <80 MM HG: CPT | Performed by: INTERNAL MEDICINE

## 2023-06-20 PROCEDURE — 1123F ACP DISCUSS/DSCN MKR DOCD: CPT | Performed by: INTERNAL MEDICINE

## 2023-06-20 RX ORDER — CHLORAL HYDRATE 500 MG
CAPSULE ORAL 2 TIMES DAILY
COMMUNITY

## 2023-06-20 SDOH — ECONOMIC STABILITY: INCOME INSECURITY: HOW HARD IS IT FOR YOU TO PAY FOR THE VERY BASICS LIKE FOOD, HOUSING, MEDICAL CARE, AND HEATING?: NOT HARD AT ALL

## 2023-06-20 SDOH — ECONOMIC STABILITY: FOOD INSECURITY: WITHIN THE PAST 12 MONTHS, YOU WORRIED THAT YOUR FOOD WOULD RUN OUT BEFORE YOU GOT MONEY TO BUY MORE.: NEVER TRUE

## 2023-06-20 SDOH — ECONOMIC STABILITY: HOUSING INSECURITY
IN THE LAST 12 MONTHS, WAS THERE A TIME WHEN YOU DID NOT HAVE A STEADY PLACE TO SLEEP OR SLEPT IN A SHELTER (INCLUDING NOW)?: NO

## 2023-06-20 SDOH — ECONOMIC STABILITY: FOOD INSECURITY: WITHIN THE PAST 12 MONTHS, THE FOOD YOU BOUGHT JUST DIDN'T LAST AND YOU DIDN'T HAVE MONEY TO GET MORE.: NEVER TRUE

## 2023-06-20 ASSESSMENT — LIFESTYLE VARIABLES
HAVE YOU OR SOMEONE ELSE BEEN INJURED AS A RESULT OF YOUR DRINKING: 0
HAS A RELATIVE, FRIEND, DOCTOR, OR ANOTHER HEALTH PROFESSIONAL EXPRESSED CONCERN ABOUT YOUR DRINKING OR SUGGESTED YOU CUT DOWN: 0
HOW OFTEN DURING THE LAST YEAR HAVE YOU BEEN UNABLE TO REMEMBER WHAT HAPPENED THE NIGHT BEFORE BECAUSE YOU HAD BEEN DRINKING: 0
HOW OFTEN DURING THE LAST YEAR HAVE YOU FAILED TO DO WHAT WAS NORMALLY EXPECTED FROM YOU BECAUSE OF DRINKING: 0
HOW OFTEN DO YOU HAVE A DRINK CONTAINING ALCOHOL: 4 OR MORE TIMES A WEEK
HOW OFTEN DURING THE LAST YEAR HAVE YOU HAD A FEELING OF GUILT OR REMORSE AFTER DRINKING: 0
HOW MANY STANDARD DRINKS CONTAINING ALCOHOL DO YOU HAVE ON A TYPICAL DAY: 1 OR 2
HOW OFTEN DURING THE LAST YEAR HAVE YOU FOUND THAT YOU WERE NOT ABLE TO STOP DRINKING ONCE YOU HAD STARTED: 0
HOW OFTEN DURING THE LAST YEAR HAVE YOU NEEDED AN ALCOHOLIC DRINK FIRST THING IN THE MORNING TO GET YOURSELF GOING AFTER A NIGHT OF HEAVY DRINKING: 0

## 2023-06-20 ASSESSMENT — PATIENT HEALTH QUESTIONNAIRE - PHQ9
SUM OF ALL RESPONSES TO PHQ9 QUESTIONS 1 & 2: 0
SUM OF ALL RESPONSES TO PHQ QUESTIONS 1-9: 0
SUM OF ALL RESPONSES TO PHQ QUESTIONS 1-9: 0
1. LITTLE INTEREST OR PLEASURE IN DOING THINGS: 0
2. FEELING DOWN, DEPRESSED OR HOPELESS: 0
SUM OF ALL RESPONSES TO PHQ QUESTIONS 1-9: 0
SUM OF ALL RESPONSES TO PHQ QUESTIONS 1-9: 0

## 2023-06-20 NOTE — PROGRESS NOTES
1. \"Have you been to the ER, urgent care clinic since your last visit? Hospitalized since your last visit? \" No    2. \"Have you seen or consulted any other health care providers outside of the [address] [place] [address] since your last visit? \"           Being treated with chemo for bladder // Dr. Marko Fields    Neurology // [facility]ell    3. For patients aged 39-70: Has the patient had a colonoscopy / FIT/ Cologuard? 2020 Shayla Rise      If the patient is female:    4. For patients aged 41-77: Has the patient had a mammogram within the past 2 years? No      5. For patients aged 21-65: Has the patient had a pap smear?   No

## 2023-06-20 NOTE — PROGRESS NOTES
HISTORY OF PRESENT ILLNESS   Jose Lopez   is a 80 y.o.  male. 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  No hx BPH or OAB  Last a1c 6.3-no fsbs checks  Has ongoing neuropathy in feet that bothers him at niught on neurontin 2400mg every day-no problems during the day  Some right sciatica too     Patient Active Problem List    Diagnosis Date Noted    Type 2 diabetes mellitus with chronic kidney disease ([address] 11/21/2022    Type 2 diabetes mellitus with diabetic neuropathy ([address] 11/21/2022    Chronic renal disease, stage III ([address] 06/07/2022    SOB (shortness of breath) 05/27/2022    Pulmonary embolism ([address] 05/27/2022    Cancer of left renal pelvis ([address] 05/25/2022    Vitamin B12 deficiency 12/14/2021    Vitamin D deficiency 12/14/2021    Diabetic peripheral neuropathy associated with type 2 diabetes mellitus ([address] 12/14/2021    Lumbar back pain with radiculopathy affecting right lower extremity 12/14/2021    Degenerative lumbar spinal stenosis 12/14/2021    Immune-mediated neuropathy ([address] 12/14/2021    Diverticulosis 06/29/2019    Status post right hip replacement 06/13/2019    Hx of gout 05/28/2019    Coronary artery disease involving native coronary artery of native heart without angina pectoris 05/28/2019

## 2023-06-21 ENCOUNTER — OFFICE VISIT (OUTPATIENT)
Age: 83
End: 2023-06-21

## 2023-06-21 DIAGNOSIS — R45.4 IRRITABILITY: ICD-10-CM

## 2023-06-21 DIAGNOSIS — R41.3 MEMORY LOSS: Primary | ICD-10-CM

## 2023-06-21 DIAGNOSIS — N18.30 STAGE 3 CHRONIC KIDNEY DISEASE, UNSPECIFIED WHETHER STAGE 3A OR 3B CKD (HCC): Primary | ICD-10-CM

## 2023-06-21 NOTE — PROGRESS NOTES
Intake Note      Patient Name: Jose Lopez  YOB: 1940    Age: 80 y.o. Date of Intake: 6/21/2023   Education: 17 Ethnicity White   Gender: Male Referring Provider: GRECIA Herrera     REASON FOR REFERRAL AND EVALUATION PROCEDURES:  Jose Lopez  was referred for evaluation by his Neurology Nurse Practitioner to assist in differential diagnosis and individualized treatment planning. he understood the rationale and procedures for evaluation, as well as the limits to confidentiality, and agreed to participate. he consented to have this report made available to his  treating providers through his  electronic medical records. History Sources: Patient, Spouse, and Medical Record    HISTORY OF PRESENT ILLNESS:  The patient is a 80-year-old male with pertinent medical history noted for vitamin B12 deficiency, coronary artery disease, hypertension, pure hypercholesterolemia, vitamin D deficiency, type 2 diabetes mellitus with diabetic peripheral neuropathy, cancer of left renal pelvis, pulmonary embolism, stage III chronic kidney disease, and shortness of breath. He presented for clinical interview accompanied by his wife. While he appeared to have fair insight and was capable of providing history, his wife contributed meaningful details. According to the patient's wife, the patient underwent hip surgery in 2017 after which he \"had a reaction to the anesthesia\" and was hallucinating. He was also reported to experience a decline in his cognitive functioning and while he experienced significant improvement following the surgery, he never returned to his baseline. The patient's wife reported observing the additional insidious onset of gradually progressive cognitive decline around 2019.   She described the changes to include attention deficits (e.g., becoming easily distracted), slowed mental processing speed, executive dysfunction (e.g., poor set switching/problem solving), and short-term episodic

## 2023-07-02 DIAGNOSIS — E11.42 TYPE 2 DIABETES MELLITUS WITH DIABETIC POLYNEUROPATHY, UNSPECIFIED WHETHER LONG TERM INSULIN USE (HCC): Primary | ICD-10-CM

## 2023-07-03 RX ORDER — GABAPENTIN 600 MG/1
TABLET ORAL
Qty: 360 TABLET | Refills: 1 | Status: SHIPPED | OUTPATIENT
Start: 2023-07-03 | End: 2024-01-03

## 2023-07-05 ENCOUNTER — TELEPHONE (OUTPATIENT)
Age: 83
End: 2023-07-05

## 2023-07-05 NOTE — TELEPHONE ENCOUNTER
Per [facility] Provider Portal no PA is needed for [id number], [id number], [id number], [id number], [id number], [id number]    REF # [id number]

## 2023-07-10 ENCOUNTER — PROCEDURE VISIT (OUTPATIENT)
Age: 83
End: 2023-07-10
Payer: COMMERCIAL

## 2023-07-10 DIAGNOSIS — G31.84 MILD COGNITIVE IMPAIRMENT: Primary | ICD-10-CM

## 2023-07-10 DIAGNOSIS — F43.22 ADJUSTMENT DISORDER WITH ANXIOUS MOOD: ICD-10-CM

## 2023-07-10 PROCEDURE — 96138 PSYCL/NRPSYC TECH 1ST: CPT | Performed by: CLINICAL NEUROPSYCHOLOGIST

## 2023-07-10 PROCEDURE — 96139 PSYCL/NRPSYC TST TECH EA: CPT | Performed by: CLINICAL NEUROPSYCHOLOGIST

## 2023-07-17 ENCOUNTER — PATIENT MESSAGE (OUTPATIENT)
Age: 83
End: 2023-07-17

## 2023-07-17 ENCOUNTER — TELEPHONE (OUTPATIENT)
Age: 83
End: 2023-07-17

## 2023-07-17 NOTE — TELEPHONE ENCOUNTER
From: Nilson Haley  To: Sylvester Be  Sent: 7/17/2023 2:44 PM EDT  Subject: Appointment    Rosalia Cutler have an appointment with you this Thursday? I don't know if this is just an error on my calendar.     Nilam Zaragoza

## 2023-07-24 PROBLEM — F43.22 ADJUSTMENT DISORDER WITH ANXIOUS MOOD: Status: ACTIVE | Noted: 2023-07-24

## 2023-07-24 PROBLEM — G31.84 MILD COGNITIVE IMPAIRMENT: Status: ACTIVE | Noted: 2023-07-24

## 2023-07-24 NOTE — PROGRESS NOTES
Below average  Inhibition: High average for speed and accuracy  Inhibition/Switching: Average for speed and high average for accuracy  Visuospatial:  Basic visuoperception: Discontinued during training due to excessive errors with limited benefit from instruction and demonstration  Pattern reconstruction: Average  Complex figure copy: Exceptionally low. While the overall copy vaguely resembled the target stimulus, the patient utilized a piecemeal approach which produced significant disorganization and detail errors  Language:  Confrontation naming: Within normal limits  Letter fluency: Below average  Semantic Fluency: Below average  Learning   List learning: Low average  Story learning: High average  Basic figure learning: Below average  Progressively complex figure learning: Average  Memory:  List recall: Below average (recalling 50% of previously learned stimuli)  Story recall: Average  Basic figure recall: Below average (recalling 100% of previously learned stimuli)  Progressively complex figure recall: Exceptionally low  Recognition:  List recognition: Exceptionally low  Story recognition: Within normal limits  Basic figure recognition: Within normal limits  Progressively complex figure recognition: Within normal limits  Functional:   Pillbox organization: Failed  Bill pay: High average  Practical judgment: Average  Psychiatric/Sleep:   Depression: Within normal limits  Anxiety: Mild  PTSD: Below cut off using both raw score and symptom cluster scoring  Daytime sleepiness: Within normal limits  Sleep quality: Elevated. Patient reported difficulties initiating and sustaining sleep. He attributed his sleep problems to using the bathroom, temperature dysregulation, bad dreams, and pain.     TIME:  Clinical Interview: 1283 - 8991 = 35 minutes  Testing by technician: 3119 - 9375 = 136 minutes  Scoring by technician: 43 minutes    BILLING:  50972 x 1 Unit  96138 x 1 Unit  96139 x 5 Units

## 2023-07-26 ENCOUNTER — OFFICE VISIT (OUTPATIENT)
Age: 83
End: 2023-07-26
Payer: COMMERCIAL

## 2023-07-26 DIAGNOSIS — G31.84 MILD COGNITIVE IMPAIRMENT: Primary | ICD-10-CM

## 2023-07-26 DIAGNOSIS — F43.22 ADJUSTMENT DISORDER WITH ANXIOUS MOOD: ICD-10-CM

## 2023-07-26 PROCEDURE — 96132 NRPSYC TST EVAL PHYS/QHP 1ST: CPT | Performed by: CLINICAL NEUROPSYCHOLOGIST

## 2023-07-26 PROCEDURE — 96133 NRPSYC TST EVAL PHYS/QHP EA: CPT | Performed by: CLINICAL NEUROPSYCHOLOGIST

## 2023-07-26 NOTE — PROGRESS NOTES
Prior to seeing the patient I reviewed pertinent records, including the previously completed report, the records in [address] [place] [address], and any updated visits from other providers since the patient's last visit. Today, I engaged in a psychoeducational and supportive feedback session with the patient. I provided psychotherapy in the form of psychoeducation and support with respect to the results of the recent Neuropsychological Evaluation, including discussing individual tests as well as patient's areas of neurocognitive strength versus weakness. We discussed, in detail, the following:  Cognitive testing revealed declines on measures of visuospatial functioning, learning, memory, verbal fluency, mental processing speed, and executive functioning. Patient meets criteria for the diagnosis of mild cognitive impairment  Hearing and anxiety may be affecting the patient's cognitive functioning; however, I suspect there are also other organic contributions (e.g., vascular). Reviewed recommendations outlined in report  Answered questions to the best of my ability    Education was provided regarding my diagnostic impressions, and we discussed treatment plan/options. I also answered numerous questions related to the clinical findings, including discussing various methods to improve cognition and mood. Supportive/Cognitive Behavioral/Solution Focused psychotherapy provided. The patient needs to:    Follow-up with referring provider for ongoing management  Emphasize modifiable protective behaviors for cognitive functioning  Follow-up in 12 months    The patient had the following concerns which I deferred to their referring provider:   meds for anxiety  Discussed sleep and possibility of sleep apnea      TIME:  Clinical Interview: 2285 - 1630 = 35 minutes  Testing by technician: [phone] = 136 minutes  Scoring by technician: 43 minutes  Neuropsychological testing evaluation services*: 146 minutes + 15 minutes feedback = 161

## 2023-08-11 ENCOUNTER — PATIENT MESSAGE (OUTPATIENT)
Age: 83
End: 2023-08-11

## 2023-08-16 ENCOUNTER — CLINICAL DOCUMENTATION (OUTPATIENT)
Age: 83
End: 2023-08-16

## 2023-08-16 ENCOUNTER — TELEPHONE (OUTPATIENT)
Age: 83
End: 2023-08-16

## 2023-08-16 RX ORDER — SERTRALINE HYDROCHLORIDE 25 MG/1
25 TABLET, FILM COATED ORAL DAILY
Qty: 90 TABLET | Refills: 1 | Status: SHIPPED | OUTPATIENT
Start: 2023-08-16

## 2023-08-16 NOTE — TELEPHONE ENCOUNTER
Pt states he is waiting on a call back pertaining to his results. Pt states he sent a My Chart message and has heard nothing. Pt is asking that Dr. Talya Beard give him a call pertaining to further treatment.

## 2023-08-16 NOTE — PROGRESS NOTES
Returned call to pt regarding neuropsych evaluation. He declines Brain MRI and sleep study. Agreeable wit starting zoloft for anxiety and agitation--sent to mail order pharmacy. Recommended appt in 3-4 months fu.

## 2023-08-17 NOTE — TELEPHONE ENCOUNTER
From: Silvina Rutledge  To: Dr. Seb Brown: 8/11/2023 9:59 AM EDT  Subject: My recent [facility] visit and Dr. Madhu Gurrola appointments    Dr. Kayla Hernadez,  I was instructed to contact you about both of these recent appointments for any needed or suggested follow-up relating to MRI, medication, therapy. Initially, have your received both of these reports and had a chance to go over them? If so, please advise further course of action.    Keller Dakins, [phone]

## 2023-09-07 ENCOUNTER — TELEMEDICINE (OUTPATIENT)
Age: 83
End: 2023-09-07
Payer: COMMERCIAL

## 2023-09-07 DIAGNOSIS — J40 BRONCHITIS: ICD-10-CM

## 2023-09-07 DIAGNOSIS — R05.1 ACUTE COUGH: Primary | ICD-10-CM

## 2023-09-07 PROCEDURE — G8427 DOCREV CUR MEDS BY ELIG CLIN: HCPCS | Performed by: INTERNAL MEDICINE

## 2023-09-07 PROCEDURE — 1123F ACP DISCUSS/DSCN MKR DOCD: CPT | Performed by: INTERNAL MEDICINE

## 2023-09-07 PROCEDURE — 99213 OFFICE O/P EST LOW 20 MIN: CPT | Performed by: INTERNAL MEDICINE

## 2023-09-07 RX ORDER — IBUPROFEN 400 MG/1
400 TABLET ORAL EVERY 8 HOURS PRN
COMMUNITY
Start: 2018-08-01 | End: 2023-09-07

## 2023-09-07 RX ORDER — AZITHROMYCIN 250 MG/1
250 TABLET, FILM COATED ORAL SEE ADMIN INSTRUCTIONS
Qty: 6 TABLET | Refills: 0 | Status: SHIPPED | OUTPATIENT
Start: 2023-09-07 | End: 2023-09-12

## 2023-09-07 RX ORDER — GUAIFENESIN AND CODEINE PHOSPHATE 100; 10 MG/5ML; MG/5ML
5 SOLUTION ORAL EVERY 4 HOURS PRN
Qty: 100 ML | Refills: 0 | Status: SHIPPED | OUTPATIENT
Start: 2023-09-07 | End: 2023-09-14

## 2023-09-07 NOTE — PROGRESS NOTES
HISTORY OF PRESENT ILLNESS   Echo Berman   is a 80 y.o.  male. Echo Berman, was evaluated through a synchronous (real-time) audio-video encounter. The patient (or guardian if applicable) is aware that this is a billable service, which includes applicable co-pays. This Virtual Visit was conducted with patient's (and/or legal guardian's) consent. Patient identification was verified, and a caregiver was present when appropriate. The patient was located at Home: [address]  Provider was located at [facility] (Appt Dept): [address]         Total time spent for this encounter: Not billed by time    --Amelia Christopher MD on 9/7/2023 at 12:49 PM    An electronic signature was used to authenticate this note.   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 , Bladder cancer-    C/o bad cough and cold symptoms x 1 week  Cough with phlegm-coughing a lot and unable to sleep at night due to cough  Some chills  No sob Temp 96.6 today  Had sorethroat -improved  Has runny nose    Covid negative 2 days ago        Patient Active Problem List    Diagnosis Date Noted    Mild cognitive impairment 07/24/2023    Adjustment disorder with anxious mood 07/24/2023    Type 2 diabetes mellitus with diabetic neuropathy ([address]) 11/21/2022    Chronic renal disease, stage III ([address]) 06/07/2022    SOB (shortness of breath) 05/27/2022    Pulmonary embolism ([address]) 05/27/2022    Cancer of left renal pelvis ([address]) 05/25/2022    Vitamin B12 deficiency 12/14/2021    Vitamin D deficiency 12/14/2021    Diabetic peripheral neuropathy associated with type 2 diabetes mellitus ([address]) 12/14/2021    Lumbar back pain with radiculopathy affecting right lower extremity 12/14/2021    Degenerative lumbar spinal stenosis 12/14/2021    Diverticulosis 06/29/2019

## 2023-09-07 NOTE — PROGRESS NOTES
1. \"Have you been to the ER, urgent care clinic since your last visit? Hospitalized since your last visit? \" No    2. \"Have you seen or consulted any other health care providers outside of the [address] [place] [address] since your last visit? \"       Nephrologist Familia kowalski

## 2023-10-20 ENCOUNTER — TELEPHONE (OUTPATIENT)
Age: 83
End: 2023-10-20

## 2023-10-20 NOTE — TELEPHONE ENCOUNTER
[facility] needs labs faxed on pt cbc & cmp/bmp    Fax #[phone]  Attn: Maurilio Samaniego

## 2023-10-31 RX ORDER — LOSARTAN POTASSIUM 50 MG/1
50 TABLET ORAL DAILY
Qty: 90 TABLET | Refills: 3 | Status: SHIPPED | OUTPATIENT
Start: 2023-10-31

## 2023-10-31 RX ORDER — ATORVASTATIN CALCIUM 80 MG/1
80 TABLET, FILM COATED ORAL DAILY
Qty: 90 TABLET | Refills: 3 | Status: SHIPPED | OUTPATIENT
Start: 2023-10-31

## 2023-10-31 RX ORDER — ALLOPURINOL 100 MG/1
TABLET ORAL DAILY
Qty: 90 TABLET | Refills: 3 | Status: SHIPPED | OUTPATIENT
Start: 2023-10-31

## 2023-12-26 RX ORDER — SERTRALINE HYDROCHLORIDE 25 MG/1
25 TABLET, FILM COATED ORAL DAILY
Qty: 90 TABLET | Refills: 3 | Status: SHIPPED | OUTPATIENT
Start: 2023-12-26

## 2024-01-24 RX ORDER — ISOSORBIDE MONONITRATE 60 MG/1
60 TABLET, EXTENDED RELEASE ORAL DAILY
Qty: 90 TABLET | Refills: 3 | Status: SHIPPED | OUTPATIENT
Start: 2024-01-24

## 2024-03-21 RX ORDER — METRONIDAZOLE 10 MG/G
GEL TOPICAL DAILY
Qty: 60 G | Refills: 5 | Status: SHIPPED | OUTPATIENT
Start: 2024-03-21

## 2024-03-22 RX ORDER — TIZANIDINE 2 MG/1
TABLET ORAL
Qty: 60 TABLET | Refills: 2 | OUTPATIENT
Start: 2024-03-22

## 2024-03-31 DIAGNOSIS — E11.42 TYPE 2 DIABETES MELLITUS WITH DIABETIC POLYNEUROPATHY, UNSPECIFIED WHETHER LONG TERM INSULIN USE (HCC): ICD-10-CM

## 2024-04-01 RX ORDER — GABAPENTIN 600 MG/1
TABLET ORAL
Qty: 360 TABLET | Refills: 3 | OUTPATIENT
Start: 2024-04-01

## 2024-04-03 ENCOUNTER — PATIENT MESSAGE (OUTPATIENT)
Age: 84
End: 2024-04-03

## 2024-04-04 NOTE — TELEPHONE ENCOUNTER
From: Adama Luz  To: Laura Archer  Sent: 4/3/2024 2:35 PM EDT  Subject: Schedule visit?    Laura,    Not sure what vilma note is about scheduling a visit. I didn't request a renewal of what?    Adama

## 2024-04-29 ENCOUNTER — OFFICE VISIT (OUTPATIENT)
Age: 84
End: 2024-04-29
Payer: COMMERCIAL

## 2024-04-29 VITALS
SYSTOLIC BLOOD PRESSURE: 122 MMHG | HEART RATE: 62 BPM | BODY MASS INDEX: 28.23 KG/M2 | TEMPERATURE: 97.3 F | OXYGEN SATURATION: 97 % | RESPIRATION RATE: 18 BRPM | DIASTOLIC BLOOD PRESSURE: 70 MMHG | HEIGHT: 70 IN | WEIGHT: 197.2 LBS

## 2024-04-29 DIAGNOSIS — M54.16 RADICULOPATHY, LUMBAR REGION: ICD-10-CM

## 2024-04-29 DIAGNOSIS — G31.84 MILD COGNITIVE IMPAIRMENT: Primary | ICD-10-CM

## 2024-04-29 DIAGNOSIS — F43.22 ADJUSTMENT DISORDER WITH ANXIOUS MOOD: ICD-10-CM

## 2024-04-29 DIAGNOSIS — G60.9 IDIOPATHIC PERIPHERAL NEUROPATHY: ICD-10-CM

## 2024-04-29 PROCEDURE — 1036F TOBACCO NON-USER: CPT | Performed by: NURSE PRACTITIONER

## 2024-04-29 PROCEDURE — 1123F ACP DISCUSS/DSCN MKR DOCD: CPT | Performed by: NURSE PRACTITIONER

## 2024-04-29 PROCEDURE — 99214 OFFICE O/P EST MOD 30 MIN: CPT | Performed by: NURSE PRACTITIONER

## 2024-04-29 PROCEDURE — 3078F DIAST BP <80 MM HG: CPT | Performed by: NURSE PRACTITIONER

## 2024-04-29 PROCEDURE — 3074F SYST BP LT 130 MM HG: CPT | Performed by: NURSE PRACTITIONER

## 2024-04-29 PROCEDURE — G8427 DOCREV CUR MEDS BY ELIG CLIN: HCPCS | Performed by: NURSE PRACTITIONER

## 2024-04-29 PROCEDURE — G8419 CALC BMI OUT NRM PARAM NOF/U: HCPCS | Performed by: NURSE PRACTITIONER

## 2024-04-29 ASSESSMENT — PATIENT HEALTH QUESTIONNAIRE - PHQ9
SUM OF ALL RESPONSES TO PHQ QUESTIONS 1-9: 0
1. LITTLE INTEREST OR PLEASURE IN DOING THINGS: NOT AT ALL
SUM OF ALL RESPONSES TO PHQ9 QUESTIONS 1 & 2: 0
SUM OF ALL RESPONSES TO PHQ QUESTIONS 1-9: 0
SUM OF ALL RESPONSES TO PHQ QUESTIONS 1-9: 0
2. FEELING DOWN, DEPRESSED OR HOPELESS: NOT AT ALL
SUM OF ALL RESPONSES TO PHQ QUESTIONS 1-9: 0

## 2024-04-29 ASSESSMENT — ENCOUNTER SYMPTOMS
BACK PAIN: 1
EYES NEGATIVE: 1

## 2024-04-29 NOTE — PROGRESS NOTES
[facility]  [address]  [address]  [place] [address]  Tel: [phone]  Fax: [phone]      Date:  24     Name:  HEATH ROD  :  1940  MRN:  [id number]     PCP:  Ruben Rendon MD    Chief Complaint   Patient presents with    Neurologic Problem     Follow up for neuropathy in feet - particularly the toes - hurts when walking otherwise they are fine         HISTORY OF PRESENT ILLNESS:  Patient presents today for regular follow up, last seen . Doing ok for the most part. He notes that his lower back has been pretty good, it bothers him every once andreia while. Hasn't needed injections in a while. Feels like his legs are worse, from his knees to his toes he is having more numbness and increased sharp pain in his toes. Worse when walking.  Supposedly patient was seen by some type of foot and ankle specialist, insoles were recommended, which she does wear in his sneakers when he is going to be doing walking.  Otherwise no medications or other recommendations were suggested.  He has not been back in follow-up.  Gabapentin 600mg BID and 1200mg at night time: has been taking this for years.  Patient is really uncertain as to whether this is helping or not  Yoga a couple times a week  Tizanidine 2m at bedtime was helpful in the past, not taking it anymore as the muscle spasms have improved, he does have some of the medication in case.  Patient did  complete neuropsych testing 2023: Mild cognitive impairment however patient also had identifiable anxiety.  He did recommend repeat neuropsych testing.  As well as updating imaging.  Zoloft added by his primary care for anxiety has been helpful.  Forgeting how to do certain things on the computer, feels like memory has deteriorated a little bit.     Recap from last visit :   1. Muscle spasms of both lower extremities: We will provide the patient with a low-dose prescription of tizanidine 2 mg he can try in the

## 2024-04-29 NOTE — PROGRESS NOTES
Chief Complaint   Patient presents with    Neurologic Problem     Follow up for neuropathy in feet - particularly the toes - hurts when walking otherwise they are fine       1. Have you been to the ER, urgent care clinic since your last visit?  Hospitalized since your last visit? No     2. Have you seen or consulted any other health care providers outside of the [facility] since your last visit?  Include any pap smears or colon screening.  Yes Dr Christianson - cardiology

## 2024-05-05 DIAGNOSIS — E11.42 TYPE 2 DIABETES MELLITUS WITH DIABETIC POLYNEUROPATHY, UNSPECIFIED WHETHER LONG TERM INSULIN USE (HCC): ICD-10-CM

## 2024-05-06 RX ORDER — GABAPENTIN 600 MG/1
TABLET ORAL
Qty: 360 TABLET | Refills: 3 | Status: SHIPPED | OUTPATIENT
Start: 2024-05-06 | End: 2025-05-06

## 2024-05-20 ENCOUNTER — HOSPITAL ENCOUNTER (OUTPATIENT)
Facility: HOSPITAL | Age: 84
Discharge: HOME | End: 2024-05-23
Payer: COMMERCIAL

## 2024-05-20 DIAGNOSIS — G31.84 MILD COGNITIVE IMPAIRMENT: ICD-10-CM

## 2024-05-20 PROCEDURE — 70553 MRI BRAIN STEM W/O & W/DYE: CPT

## 2024-05-20 PROCEDURE — 6360000004 HC RX CONTRAST MEDICATION: Performed by: NURSE PRACTITIONER

## 2024-05-20 PROCEDURE — A9575 INJ GADOTERATE MEGLUMI 0.1ML: HCPCS | Performed by: NURSE PRACTITIONER

## 2024-05-20 RX ADMIN — GADOTERATE MEGLUMINE 18 ML: 376.9 INJECTION INTRAVENOUS at 15:19

## 2024-06-17 ENCOUNTER — TELEPHONE (OUTPATIENT)
Age: 84
End: 2024-06-17

## 2024-06-17 NOTE — TELEPHONE ENCOUNTER
Pt is asking for a call back for an appt sooner than what I can schedule.  He is due around now.      Please call to schedule as pt said in December he was told that doctor's schedule wasn't out that far?

## 2024-06-20 SDOH — HEALTH STABILITY: PHYSICAL HEALTH: ON AVERAGE, HOW MANY DAYS PER WEEK DO YOU ENGAGE IN MODERATE TO STRENUOUS EXERCISE (LIKE A BRISK WALK)?: 4 DAYS

## 2024-06-20 SDOH — HEALTH STABILITY: PHYSICAL HEALTH: ON AVERAGE, HOW MANY MINUTES DO YOU ENGAGE IN EXERCISE AT THIS LEVEL?: 60 MIN

## 2024-06-20 ASSESSMENT — PATIENT HEALTH QUESTIONNAIRE - PHQ9
SUM OF ALL RESPONSES TO PHQ QUESTIONS 1-9: 0
SUM OF ALL RESPONSES TO PHQ9 QUESTIONS 1 & 2: 0
1. LITTLE INTEREST OR PLEASURE IN DOING THINGS: NOT AT ALL
2. FEELING DOWN, DEPRESSED OR HOPELESS: NOT AT ALL
SUM OF ALL RESPONSES TO PHQ QUESTIONS 1-9: 0

## 2024-06-20 ASSESSMENT — LIFESTYLE VARIABLES
HOW OFTEN DURING THE LAST YEAR HAVE YOU BEEN UNABLE TO REMEMBER WHAT HAPPENED THE NIGHT BEFORE BECAUSE YOU HAD BEEN DRINKING: NEVER
HOW OFTEN DO YOU HAVE SIX OR MORE DRINKS ON ONE OCCASION: 1
HAS A RELATIVE, FRIEND, DOCTOR, OR ANOTHER HEALTH PROFESSIONAL EXPRESSED CONCERN ABOUT YOUR DRINKING OR SUGGESTED YOU CUT DOWN: NO
HOW OFTEN DURING THE LAST YEAR HAVE YOU HAD A FEELING OF GUILT OR REMORSE AFTER DRINKING: NEVER
HOW OFTEN DO YOU HAVE A DRINK CONTAINING ALCOHOL: 4 OR MORE TIMES A WEEK
HOW OFTEN DURING THE LAST YEAR HAVE YOU FOUND THAT YOU WERE NOT ABLE TO STOP DRINKING ONCE YOU HAD STARTED: NEVER
HAS A RELATIVE, FRIEND, DOCTOR, OR ANOTHER HEALTH PROFESSIONAL EXPRESSED CONCERN ABOUT YOUR DRINKING OR SUGGESTED YOU CUT DOWN: NO
HOW OFTEN DURING THE LAST YEAR HAVE YOU NEEDED AN ALCOHOLIC DRINK FIRST THING IN THE MORNING TO GET YOURSELF GOING AFTER A NIGHT OF HEAVY DRINKING: NEVER
HOW OFTEN DURING THE LAST YEAR HAVE YOU HAD A FEELING OF GUILT OR REMORSE AFTER DRINKING: NEVER
HOW OFTEN DURING THE LAST YEAR HAVE YOU FOUND THAT YOU WERE NOT ABLE TO STOP DRINKING ONCE YOU HAD STARTED: NEVER
HOW OFTEN DURING THE LAST YEAR HAVE YOU FAILED TO DO WHAT WAS NORMALLY EXPECTED FROM YOU BECAUSE OF DRINKING: NEVER
HOW OFTEN DURING THE LAST YEAR HAVE YOU FAILED TO DO WHAT WAS NORMALLY EXPECTED FROM YOU BECAUSE OF DRINKING: NEVER
HOW OFTEN DURING THE LAST YEAR HAVE YOU BEEN UNABLE TO REMEMBER WHAT HAPPENED THE NIGHT BEFORE BECAUSE YOU HAD BEEN DRINKING: NEVER
HOW OFTEN DURING THE LAST YEAR HAVE YOU NEEDED AN ALCOHOLIC DRINK FIRST THING IN THE MORNING TO GET YOURSELF GOING AFTER A NIGHT OF HEAVY DRINKING: NEVER
HOW OFTEN DO YOU HAVE A DRINK CONTAINING ALCOHOL: 5
HAVE YOU OR SOMEONE ELSE BEEN INJURED AS A RESULT OF YOUR DRINKING: NO
HOW MANY STANDARD DRINKS CONTAINING ALCOHOL DO YOU HAVE ON A TYPICAL DAY: 1
HOW MANY STANDARD DRINKS CONTAINING ALCOHOL DO YOU HAVE ON A TYPICAL DAY: 1 OR 2
HAVE YOU OR SOMEONE ELSE BEEN INJURED AS A RESULT OF YOUR DRINKING: NO

## 2024-06-22 SDOH — ECONOMIC STABILITY: INCOME INSECURITY: HOW HARD IS IT FOR YOU TO PAY FOR THE VERY BASICS LIKE FOOD, HOUSING, MEDICAL CARE, AND HEATING?: NOT VERY HARD

## 2024-06-22 SDOH — ECONOMIC STABILITY: FOOD INSECURITY: WITHIN THE PAST 12 MONTHS, YOU WORRIED THAT YOUR FOOD WOULD RUN OUT BEFORE YOU GOT MONEY TO BUY MORE.: NEVER TRUE

## 2024-06-22 SDOH — ECONOMIC STABILITY: TRANSPORTATION INSECURITY
IN THE PAST 12 MONTHS, HAS LACK OF TRANSPORTATION KEPT YOU FROM MEETINGS, WORK, OR FROM GETTING THINGS NEEDED FOR DAILY LIVING?: NO

## 2024-06-22 SDOH — ECONOMIC STABILITY: FOOD INSECURITY: WITHIN THE PAST 12 MONTHS, THE FOOD YOU BOUGHT JUST DIDN'T LAST AND YOU DIDN'T HAVE MONEY TO GET MORE.: NEVER TRUE

## 2024-06-25 ENCOUNTER — OFFICE VISIT (OUTPATIENT)
Age: 84
End: 2024-06-25
Payer: COMMERCIAL

## 2024-06-25 VITALS
WEIGHT: 194.4 LBS | HEART RATE: 68 BPM | OXYGEN SATURATION: 97 % | RESPIRATION RATE: 18 BRPM | TEMPERATURE: 97.2 F | HEIGHT: 70 IN | SYSTOLIC BLOOD PRESSURE: 136 MMHG | BODY MASS INDEX: 27.83 KG/M2 | DIASTOLIC BLOOD PRESSURE: 70 MMHG

## 2024-06-25 DIAGNOSIS — E78.5 HYPERLIPIDEMIA, UNSPECIFIED HYPERLIPIDEMIA TYPE: ICD-10-CM

## 2024-06-25 DIAGNOSIS — I10 HYPERTENSION, UNSPECIFIED TYPE: ICD-10-CM

## 2024-06-25 DIAGNOSIS — N18.30 STAGE 3 CHRONIC KIDNEY DISEASE, UNSPECIFIED WHETHER STAGE 3A OR 3B CKD (HCC): ICD-10-CM

## 2024-06-25 DIAGNOSIS — I25.10 CORONARY ARTERY DISEASE INVOLVING NATIVE CORONARY ARTERY OF NATIVE HEART WITHOUT ANGINA PECTORIS: Primary | ICD-10-CM

## 2024-06-25 DIAGNOSIS — F41.9 ANXIETY: ICD-10-CM

## 2024-06-25 DIAGNOSIS — C67.9 MALIGNANT NEOPLASM OF URINARY BLADDER, UNSPECIFIED SITE (HCC): ICD-10-CM

## 2024-06-25 DIAGNOSIS — Z00.00 MEDICARE ANNUAL WELLNESS VISIT, SUBSEQUENT: ICD-10-CM

## 2024-06-25 DIAGNOSIS — I26.99 OTHER PULMONARY EMBOLISM WITHOUT ACUTE COR PULMONALE, UNSPECIFIED CHRONICITY (HCC): ICD-10-CM

## 2024-06-25 DIAGNOSIS — E11.40 TYPE 2 DIABETES MELLITUS WITH DIABETIC NEUROPATHY, WITHOUT LONG-TERM CURRENT USE OF INSULIN (HCC): ICD-10-CM

## 2024-06-25 PROCEDURE — 3075F SYST BP GE 130 - 139MM HG: CPT | Performed by: INTERNAL MEDICINE

## 2024-06-25 PROCEDURE — 1036F TOBACCO NON-USER: CPT | Performed by: INTERNAL MEDICINE

## 2024-06-25 PROCEDURE — G0439 PPPS, SUBSEQ VISIT: HCPCS | Performed by: INTERNAL MEDICINE

## 2024-06-25 PROCEDURE — G8427 DOCREV CUR MEDS BY ELIG CLIN: HCPCS | Performed by: INTERNAL MEDICINE

## 2024-06-25 PROCEDURE — 3078F DIAST BP <80 MM HG: CPT | Performed by: INTERNAL MEDICINE

## 2024-06-25 PROCEDURE — G8419 CALC BMI OUT NRM PARAM NOF/U: HCPCS | Performed by: INTERNAL MEDICINE

## 2024-06-25 PROCEDURE — 1123F ACP DISCUSS/DSCN MKR DOCD: CPT | Performed by: INTERNAL MEDICINE

## 2024-06-25 PROCEDURE — 99214 OFFICE O/P EST MOD 30 MIN: CPT | Performed by: INTERNAL MEDICINE

## 2024-06-25 SDOH — ECONOMIC STABILITY: FOOD INSECURITY: WITHIN THE PAST 12 MONTHS, YOU WORRIED THAT YOUR FOOD WOULD RUN OUT BEFORE YOU GOT MONEY TO BUY MORE.: NEVER TRUE

## 2024-06-25 SDOH — ECONOMIC STABILITY: FOOD INSECURITY: WITHIN THE PAST 12 MONTHS, THE FOOD YOU BOUGHT JUST DIDN'T LAST AND YOU DIDN'T HAVE MONEY TO GET MORE.: NEVER TRUE

## 2024-06-25 SDOH — ECONOMIC STABILITY: INCOME INSECURITY: HOW HARD IS IT FOR YOU TO PAY FOR THE VERY BASICS LIKE FOOD, HOUSING, MEDICAL CARE, AND HEATING?: NOT VERY HARD

## 2024-06-25 NOTE — PROGRESS NOTES
HISTORY OF PRESENT ILLNESS   Adama Luz   is a 84 y.o.  male.  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 No pain  except in feet and toes  Saw podiatrist-inserts recommended      Last OV  URO Dr Austin -appt   Dr Daniels/Dr Walton --Memory loss/MCI not dementia  CARD Dr Christianson- on aspirin. 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) 11/21/2022    Chronic renal disease, stage III (HCC) 06/07/2022    SOB (shortness of breath) 05/27/2022    Pulmonary embolism (HCC) 05/27/2022    Cancer of left renal pelvis (HCC) 05/25/2022    Vitamin B12 deficiency 12/14/2021    Vitamin D deficiency 12/14/2021    Diabetic peripheral neuropathy associated with type 2 diabetes mellitus (HCC) 12/14/2021    Lumbar back pain with radiculopathy affecting right lower extremity 12/14/2021    Degenerative lumbar spinal stenosis 12/14/2021    Diverticulosis 06/29/2019    Status post right hip replacement 06/13/2019    Hx of gout 05/28/2019    Coronary artery disease involving native coronary artery of native heart without angina pectoris 05/28/2019

## 2024-06-25 NOTE — PROGRESS NOTES
\"Have you been to the ER, urgent care clinic since your last visit?  Hospitalized since your last visit?\"    NO    “Have you seen or consulted any other health care providers outside of [facility] since your last visit?”    Podiatrist           Click Here for Release of Records Request

## 2024-06-25 NOTE — PATIENT INSTRUCTIONS
attack. These may include:    Chest pain or pressure, or a strange feeling in the chest.     Sweating.     Shortness of breath.     Pain, pressure, or a strange feeling in the back, neck, jaw, or upper belly or in one or both shoulders or arms.     Lightheadedness or sudden weakness.     A fast or irregular heartbeat.   After you call 911, the  may tell you to chew 1 adult-strength or 2 to 4 low-dose aspirin. Wait for an ambulance. Do not try to drive yourself.  Watch closely for changes in your health, and be sure to contact your doctor if you have any problems.  Where can you learn more?  Go to https://www.bright box.net/patientEd and enter F075 to learn more about \"A Healthy Heart: Care Instructions.\"  Current as of: June 24, 2023  Content Version: 14.1  © 2523-1437 [employer].   Care instructions adapted under license by [employer]. If you have questions about a medical condition or this instruction, always ask your healthcare professional. [employer] disclaims any warranty or liability for your use of this information.      Personalized Preventive Plan for Adama Luz - 6/25/2024  Medicare offers a range of preventive health benefits. Some of the tests and screenings are paid in full while other may be subject to a deductible, co-insurance, and/or copay.    Some of these benefits include a comprehensive review of your medical history including lifestyle, illnesses that may run in your family, and various assessments and screenings as appropriate.    After reviewing your medical record and screening and assessments performed today your provider may have ordered immunizations, labs, imaging, and/or referrals for you.  A list of these orders (if applicable) as well as your Preventive Care list are included within your After Visit Summary for your review.    Other Preventive Recommendations:    A preventive eye exam performed by an eye specialist is recommended every 1-2

## 2024-06-26 LAB
CHOLEST SERPL-MCNC: 110 MG/DL
ERYTHROCYTE [DISTWIDTH] IN BLOOD BY AUTOMATED COUNT: 14 % (ref 11.5–14.5)
EST. AVERAGE GLUCOSE BLD GHB EST-MCNC: 126 MG/DL
HBA1C MFR BLD: 6 % (ref 4–5.6)
HCT VFR BLD AUTO: 45.1 % (ref 36.6–50.3)
HDLC SERPL-MCNC: 42 MG/DL
HDLC SERPL: 2.6 (ref 0–5)
HGB BLD-MCNC: 14.7 G/DL (ref 12.1–17)
LDLC SERPL CALC-MCNC: 42.4 MG/DL (ref 0–100)
MCH RBC QN AUTO: 30.4 PG (ref 26–34)
MCHC RBC AUTO-ENTMCNC: 32.6 G/DL (ref 30–36.5)
MCV RBC AUTO: 93.4 FL (ref 80–99)
NRBC # BLD: 0 K/UL (ref 0–0.01)
NRBC BLD-RTO: 0 PER 100 WBC
PLATELET # BLD AUTO: 214 K/UL (ref 150–400)
PMV BLD AUTO: 10.6 FL (ref 8.9–12.9)
RBC # BLD AUTO: 4.83 M/UL (ref 4.1–5.7)
TRIGL SERPL-MCNC: 128 MG/DL
VLDLC SERPL CALC-MCNC: 25.6 MG/DL
WBC # BLD AUTO: 8.9 K/UL (ref 4.1–11.1)

## 2024-07-16 ENCOUNTER — PATIENT MESSAGE (OUTPATIENT)
Age: 84
End: 2024-07-16

## 2024-07-17 NOTE — TELEPHONE ENCOUNTER
Called/Spoke with pt     Pt notified of Pcp response:     \"Tell Pt the protein supplements should be ok but I recommend he check with his nephrologist first because excess protein in diet might be harmful to the kidneys. Not sure why he has lack of energy in mornings. He could see a sleep MD to r/o sleep apnea.\"    Pt would like the name of provider who pt was referred to before to make an appointment.   Informed pt will send information through [employer].    Verbalized understanding.

## 2024-07-17 NOTE — TELEPHONE ENCOUNTER
From: Adama Luz  To: Dr. Ruben Rendon  Sent: 7/16/2024 10:46 AM EDT  Subject: Protein Powder Supplement?    Dr. Rendon,    I've been considering taking a morning protein supplement most days of the week since I do morning workouts those days and do not feel like eating early a.m. breakfast. I'm not hungry in the a.m. even though my routine is not to have eaten or drink anything the previous 12 plus hours.     My concern is twofold: Kidney function (one) and lack of early a.m. energy prior and after workouts.   I really don't seem to regain energy until early afternoons. Am wide awake and go to bed at 11p.m after reading about an hour. Normally get up a 7a.m. Seem to sleep o.k. considering the 1 or 2 disruptive bathroom calls.    I keep forgetting to mention this during my regular appointments. This has been a long-time issue.    Thanks,    Adama

## 2024-09-11 RX ORDER — LOSARTAN POTASSIUM 50 MG/1
50 TABLET ORAL DAILY
Qty: 90 TABLET | Refills: 3 | Status: SHIPPED | OUTPATIENT
Start: 2024-09-11

## 2024-09-11 RX ORDER — ALLOPURINOL 100 MG/1
TABLET ORAL DAILY
Qty: 90 TABLET | Refills: 3 | Status: SHIPPED | OUTPATIENT
Start: 2024-09-11

## 2024-09-27 RX ORDER — ATORVASTATIN CALCIUM 80 MG/1
80 TABLET, FILM COATED ORAL DAILY
Qty: 90 TABLET | Refills: 3 | Status: SHIPPED | OUTPATIENT
Start: 2024-09-27

## 2024-10-14 ENCOUNTER — PATIENT MESSAGE (OUTPATIENT)
Age: 84
End: 2024-10-14

## 2024-10-14 RX ORDER — TIZANIDINE 2 MG/1
TABLET ORAL
Qty: 30 TABLET | Refills: 1 | Status: SHIPPED | OUTPATIENT
Start: 2024-10-14

## 2024-10-28 RX ORDER — TIZANIDINE 2 MG/1
TABLET ORAL
Qty: 180 TABLET | Refills: 1 | OUTPATIENT
Start: 2024-10-28

## 2024-10-29 ENCOUNTER — OFFICE VISIT (OUTPATIENT)
Age: 84
End: 2024-10-29
Payer: COMMERCIAL

## 2024-10-29 VITALS
RESPIRATION RATE: 18 BRPM | HEART RATE: 61 BPM | OXYGEN SATURATION: 98 % | SYSTOLIC BLOOD PRESSURE: 130 MMHG | BODY MASS INDEX: 27.35 KG/M2 | WEIGHT: 191 LBS | DIASTOLIC BLOOD PRESSURE: 76 MMHG | HEIGHT: 70 IN

## 2024-10-29 DIAGNOSIS — F43.22 ADJUSTMENT DISORDER WITH ANXIOUS MOOD: ICD-10-CM

## 2024-10-29 DIAGNOSIS — M51.360 DEGENERATION OF INTERVERTEBRAL DISC OF LUMBAR REGION WITH DISCOGENIC BACK PAIN: ICD-10-CM

## 2024-10-29 DIAGNOSIS — E11.42 TYPE 2 DIABETES MELLITUS WITH DIABETIC POLYNEUROPATHY, UNSPECIFIED WHETHER LONG TERM INSULIN USE (HCC): ICD-10-CM

## 2024-10-29 DIAGNOSIS — G31.84 MILD COGNITIVE IMPAIRMENT: Primary | ICD-10-CM

## 2024-10-29 PROCEDURE — 99214 OFFICE O/P EST MOD 30 MIN: CPT | Performed by: NURSE PRACTITIONER

## 2024-10-29 PROCEDURE — 3075F SYST BP GE 130 - 139MM HG: CPT | Performed by: NURSE PRACTITIONER

## 2024-10-29 PROCEDURE — G8427 DOCREV CUR MEDS BY ELIG CLIN: HCPCS | Performed by: NURSE PRACTITIONER

## 2024-10-29 PROCEDURE — 1036F TOBACCO NON-USER: CPT | Performed by: NURSE PRACTITIONER

## 2024-10-29 PROCEDURE — 3044F HG A1C LEVEL LT 7.0%: CPT | Performed by: NURSE PRACTITIONER

## 2024-10-29 PROCEDURE — 1123F ACP DISCUSS/DSCN MKR DOCD: CPT | Performed by: NURSE PRACTITIONER

## 2024-10-29 PROCEDURE — G8484 FLU IMMUNIZE NO ADMIN: HCPCS | Performed by: NURSE PRACTITIONER

## 2024-10-29 PROCEDURE — 3078F DIAST BP <80 MM HG: CPT | Performed by: NURSE PRACTITIONER

## 2024-10-29 PROCEDURE — G8419 CALC BMI OUT NRM PARAM NOF/U: HCPCS | Performed by: NURSE PRACTITIONER

## 2024-10-29 RX ORDER — TIZANIDINE 2 MG/1
TABLET ORAL
Qty: 60 TABLET | Refills: 5 | Status: SHIPPED | OUTPATIENT
Start: 2024-10-29

## 2024-10-29 RX ORDER — GABAPENTIN 600 MG/1
TABLET ORAL
Qty: 360 TABLET | Refills: 3 | Status: SHIPPED | OUTPATIENT
Start: 2024-10-29 | End: 2025-10-29

## 2024-10-29 ASSESSMENT — PATIENT HEALTH QUESTIONNAIRE - PHQ9
SUM OF ALL RESPONSES TO PHQ9 QUESTIONS 1 & 2: 0
2. FEELING DOWN, DEPRESSED OR HOPELESS: NOT AT ALL
SUM OF ALL RESPONSES TO PHQ QUESTIONS 1-9: 0
SUM OF ALL RESPONSES TO PHQ QUESTIONS 1-9: 0
1. LITTLE INTEREST OR PLEASURE IN DOING THINGS: NOT AT ALL
SUM OF ALL RESPONSES TO PHQ QUESTIONS 1-9: 0
SUM OF ALL RESPONSES TO PHQ QUESTIONS 1-9: 0

## 2024-10-29 ASSESSMENT — ENCOUNTER SYMPTOMS: BACK PAIN: 1

## 2024-10-29 NOTE — PROGRESS NOTES
Chief Complaint   Patient presents with    Neurologic Problem     In feet and toes and has worsened - right one is worse      1. Have you been to the ER, urgent care clinic since your last visit?  Hospitalized since your last visit? No     2. Have you seen or consulted any other health care providers outside of the [facility] since your last visit?  Include any pap smears or colon screening.  Yes Dr Key\" [facility]    
unremarkable. The cavernous sinuses are  symmetric. Optic chiasm and infundibulum grossly unremarkable. Orbits are  grossly symmetric.  Dural venous sinuses are grossly patent.  The mastoid air cells are well pneumatized and clear.    Impression  Minimal parietal predominant cerebral atrophy.  There is no intracranial mass, hemorrhage or evidence of acute infarction.  No acute intracranial process is demonstrated.        PHYSICAL EXAMINATION:    Vitals:    10/29/24 1126   BP: 130/76   Site: Left Upper Arm   Position: Sitting   Cuff Size: Large Adult   Pulse: 61   Resp: 18   SpO2: 98%   Weight: 86.6 kg (191 lb)   Height: 1.778 m (5' 10\")       General:  Well defined, nourished, and well groomed individual in no acute distress.    Musculoskeletal:  Extremities revealed no edema and had full range of motion of joints.    Psych:  Good mood and bright affect.    NEUROLOGICAL EXAMINATION:     Mental Status:   Alert and oriented to person, place, and time with recent and remote memory intact.  Attention span and concentration are normal. Clear speech. Fund of knowledge preserved. Clock test:  no mistakes  Word Recall: 0/3.  Pt able to spell world backwards: no.    Cranial Nerves:   PERRLA.  Visual fields were full  EOM: no evidence of nystagmus  Facial sensation:  normal and symmetric  Facial motor: normal and symmetric, no facial droop noted.  Hearing intact  SCM strength intact  Tongue: midline without fasciculations    Motor Examination: Normal tone. 5/5 muscle strength in bilateral upper and lower extremities. No cogwheel rigidity.  No muscle wasting, no twitching or fasciculation noted.      Sensory exam:  Normal throughout to light touch in BUE, decreased sensation to sharp touch in BLE to his shins. Vibration sense absent B toes, 4-5 seconds B ankles.     Coordination:   Finger to nose and rapid arm movement testing was normal.  No resting or intention tremor.  Negative Romberg, negative pronator drift.      Gait

## 2024-11-03 ASSESSMENT — SLEEP AND FATIGUE QUESTIONNAIRES
AVERAGE NUMBER OF SLEEP HOURS: 7
HOW LIKELY ARE YOU TO NOD OFF OR FALL ASLEEP WHILE LYING DOWN TO REST IN THE AFTERNOON WHEN CIRCUMSTANCES PERMIT: HIGH CHANCE OF DOZING
HOW MANY NAPS DO YOU TAKE PER WEEK: 5
HOW LIKELY ARE YOU TO NOD OFF OR FALL ASLEEP WHEN YOU ARE A PASSENGER IN A CAR FOR AN HOUR WITHOUT A BREAK: WOULD NEVER DOZE
HOW LIKELY ARE YOU TO NOD OFF OR FALL ASLEEP WHILE LYING DOWN TO REST IN THE AFTERNOON WHEN CIRCUMSTANCES PERMIT: HIGH CHANCE OF DOZING
NUMBER OF TIMES YOU WAKE PER NIGHT: 2
DO YOU GET TOO LITTLE SLEEP AT NIGHT: NO
HOW LONG DO YOU NAP: 30 TO 45 MINUTES
HOW LIKELY ARE YOU TO NOD OFF OR FALL ASLEEP WHILE SITTING AND READING: WOULD NEVER DOZE
DO YOU HAVE DIFFICULTY OPERATING A MOTOR VEHICLE FOR LONG DISTANCES (GREATER THAN 100 MILES) BECAUSE YOU BECOME SLEEPY: YES, A LITTLE
DO YOU HAVE DIFFICULTY CONCENTRATING ON THE THINGS YOU DO BECAUSE YOU ARE SLEEPY OR TIRED: YES, A LITTLE
HOW LIKELY ARE YOU TO NOD OFF OR FALL ASLEEP WHILE SITTING QUIETLY AFTER LUNCH WITHOUT ALCOHOL: WOULD NEVER DOZE
DO YOU HAVE PROBLEMS WITH FREQUENT AWAKENINGS AT NIGHT: YES
ARE YOU BOTHERED BY WAKING UP TOO EARLY AND NOT BEING ABLE TO GET BACK TO SLEEP: NO
FOSQ SCORE: 13
HOW LIKELY ARE YOU TO NOD OFF OR FALL ASLEEP WHEN YOU ARE A PASSENGER IN A CAR FOR AN HOUR WITHOUT A BREAK: WOULD NEVER DOZE
HAS YOUR RELATIONSHIP WITH FAMILY, FRIENDS OR WORK COLLEAGUES BEEN AFFECTED BECAUSE YOU ARE SLEEPY OR TIRED: YES, MODERATE
DO YOU GET TOO LITTLE SLEEP AT NIGHT: NO
DO YOU HAVE DIFFICULTY WATCHING A MOVIE OR VIDEO BECAUSE YOU BECOME SLEEPY OR TIRED: YES, A LITTLE
DO YOU TAKE NAPS: YES
DO YOU HAVE DIFFICULTY OPERATING A MOTOR VEHICLE FOR SHORT DISTANCES (LESS THAN 100 MILES) BECAUSE YOU BECOME SLEEPY: NO
AVERAGE NUMBER OF SLEEP HOURS: 7
HAS YOUR MOOD BEEN AFFECTED BECAUSE YOU ARE SLEEPY OR TIRED: YES, MODERATE
ESS TOTAL SCORE: 4
DO YOU WORK SHIFTS: NO
ARE YOU BOTHERED BY WAKING UP TOO EARLY AND NOT BEING ABLE TO GET BACK TO SLEEP: NO
HOW LIKELY ARE YOU TO NOD OFF OR FALL ASLEEP WHILE SITTING QUIETLY AFTER LUNCH WITHOUT ALCOHOL: WOULD NEVER DOZE
HOW LIKELY ARE YOU TO NOD OFF OR FALL ASLEEP WHILE SITTING AND READING: WOULD NEVER DOZE
HOW LIKELY ARE YOU TO NOD OFF OR FALL ASLEEP WHILE SITTING INACTIVE IN A PUBLIC PLACE: WOULD NEVER DOZE
HOW LIKELY ARE YOU TO NOD OFF OR FALL ASLEEP WHILE SITTING INACTIVE IN A PUBLIC PLACE: WOULD NEVER DOZE
HOW LIKELY ARE YOU TO NOD OFF OR FALL ASLEEP WHILE WATCHING TV: SLIGHT CHANCE OF DOZING
DO YOU HAVE DIFFICULTY BEING AS ACTIVE AS YOU WANT TO BE IN THE MORNING BECAUSE YOU ARE SLEEPY OR TIRED: YES, EXTREME
HOW LIKELY ARE YOU TO NOD OFF OR FALL ASLEEP IN A CAR, WHILE STOPPED FOR A FEW MINUTES IN TRAFFIC: WOULD NEVER DOZE
HOW LIKELY ARE YOU TO NOD OFF OR FALL ASLEEP WHILE SITTING AND TALKING TO SOMEONE: WOULD NEVER DOZE
SELECT ANY OF THE FOLLOWING BEHAVIORS OBSERVED WHILE YOU ARE ASLEEP: NONE OF THE ABOVE
HOW LIKELY ARE YOU TO NOD OFF OR FALL ASLEEP WHILE SITTING AND TALKING TO SOMEONE: WOULD NEVER DOZE
DO YOU GENERALLY HAVE DIFFICULTY REMEMBERING THINGS BECAUSE YOU ARE SLEEPY OR TIRED: YES, EXTREME
HOW LIKELY ARE YOU TO NOD OFF OR FALL ASLEEP WHILE WATCHING TV: SLIGHT CHANCE OF DOZING
DO YOU HAVE DIFFICULTY VISITING YOUR FAMILY OR FRIENDS IN THEIR HOME BECAUSE YOU BECOME SLEEPY OR TIRED: YES, A LITTLE
DO YOU HAVE DIFFICULTY BEING AS ACTIVE AS YOU WANT TO BE IN THE EVENING BECAUSE YOU ARE SLEEPY OR TIRED: NO
HOW LIKELY ARE YOU TO NOD OFF OR FALL ASLEEP IN A CAR, WHILE STOPPED FOR A FEW MINUTES IN TRAFFIC: WOULD NEVER DOZE

## 2024-11-04 ENCOUNTER — OFFICE VISIT (OUTPATIENT)
Age: 84
End: 2024-11-04
Payer: COMMERCIAL

## 2024-11-04 VITALS
HEART RATE: 58 BPM | WEIGHT: 193.2 LBS | TEMPERATURE: 98 F | OXYGEN SATURATION: 93 % | DIASTOLIC BLOOD PRESSURE: 65 MMHG | SYSTOLIC BLOOD PRESSURE: 129 MMHG | BODY MASS INDEX: 27.66 KG/M2 | HEIGHT: 70 IN

## 2024-11-04 DIAGNOSIS — I10 PRIMARY HYPERTENSION: ICD-10-CM

## 2024-11-04 DIAGNOSIS — G47.33 OBSTRUCTIVE SLEEP APNEA (ADULT) (PEDIATRIC): Primary | ICD-10-CM

## 2024-11-04 PROCEDURE — 3078F DIAST BP <80 MM HG: CPT | Performed by: INTERNAL MEDICINE

## 2024-11-04 PROCEDURE — G8419 CALC BMI OUT NRM PARAM NOF/U: HCPCS | Performed by: INTERNAL MEDICINE

## 2024-11-04 PROCEDURE — 3074F SYST BP LT 130 MM HG: CPT | Performed by: INTERNAL MEDICINE

## 2024-11-04 PROCEDURE — G8484 FLU IMMUNIZE NO ADMIN: HCPCS | Performed by: INTERNAL MEDICINE

## 2024-11-04 PROCEDURE — G8427 DOCREV CUR MEDS BY ELIG CLIN: HCPCS | Performed by: INTERNAL MEDICINE

## 2024-11-04 PROCEDURE — 1036F TOBACCO NON-USER: CPT | Performed by: INTERNAL MEDICINE

## 2024-11-04 PROCEDURE — 99204 OFFICE O/P NEW MOD 45 MIN: CPT | Performed by: INTERNAL MEDICINE

## 2024-11-04 PROCEDURE — 1123F ACP DISCUSS/DSCN MKR DOCD: CPT | Performed by: INTERNAL MEDICINE

## 2024-11-04 NOTE — PROGRESS NOTES
[address]  [place] [address]  Tel.  [phone]  Fax. [phone] [address]  [place] [address]  Tel.  [phone]  Fax. [phone] [address] [place] [address]  Tel.  [phone]  Fax. [phone]         Subjective:      Adama Luz is an 84 y.o. male referred for evaluation for a sleep disorder. He complains of excessive daytime sleepiness associated with snoring.  Symptoms began several years ago, unchanged since that time. He usually can fall asleep in within 20 minutes.  Family or house members note snoring. He denies falling asleep while driving.  Adama Luz does wake up frequently at night. He is not bothered by waking up too early and left unable to get back to sleep. He actually sleeps about 7 hours at night and wakes up about 2 times during the night. He does not work shifts:  .   Adama indicates he does not get too little sleep at night. His bedtime is 2300. He awakens at 0700. He does take naps. He takes 5 naps a week lasting 30 to 45. He has the following observed behaviors: Not applicable;  .  Other remarks:    Had sleep study 35 years ago which showed no need for PAP.      11/3/2024    10:44 AM   Sleep Medicine   Sitting and reading 0   Watching TV 1   Sitting, inactive in a public place (e.g. a theatre or a meeting) 0   As a passenger in a car for an hour without a break 0   Lying down to rest in the afternoon when circumstances permit 3   Sitting and talking to someone 0   Sitting quietly after a lunch without alcohol 0   In a car, while stopped for a few minutes in traffic 0   Sabina Sleepiness Score 4   Neck (Inches) 16        No Known Allergies      Current Outpatient Medications:     gabapentin (NEURONTIN) 600 MG tablet, TAKE 1 TABLET BY MOUTH TWICE  DAILY AND 2 TABLETS EVERY NIGHT  AT BEDTIME, Disp: 360 tablet, Rfl: 3    tiZANidine (ZANAFLEX) 2 MG tablet, 1-2 po QHS PRN muscle spasms, Disp: 60 tablet, Rfl: 5

## 2024-11-04 NOTE — PATIENT INSTRUCTIONS
[address]  [place] [address]  Tel.  [phone]  Fax. [phone] [address]  [place] [address]  Tel.  [phone]  Fax. [phone] [address] [place] [address]  Tel.  [phone]  Fax. [phone]     Sleep Apnea: After Your Visit  Your Care Instructions  Sleep apnea occurs when you frequently stop breathing for 10 seconds or longer during sleep. It can be mild to severe, based on the number of times per hour that you stop breathing or have slowed breathing. Blocked or narrowed airways in your nose, mouth, or throat can cause sleep apnea. Your airway can become blocked when your throat muscles and tongue relax during sleep.  Sleep apnea is common, occurring in 1 out of 20 individuals.  Individuals having any of the following characteristics should be evaluated and treated right away due to high risk and detrimental consequences from untreated sleep apnea:  Obesity  Congestive Heart failure  Atrial Fibrillation  Uncontrolled Hypertension  Type II Diabetes  Night-time Arrhythmias  Stroke  Pulmonary Hypertension  High-risk Driving Populations (pilots, truck drivers, etc.)  Patients Considering Weight-loss Surgery    How do you know you have sleep apnea?  You probably have sleep apnea if you answer 'yes' to 3 or more of the following questions:  S - Have you been told that you Snore?   T - Are you often Tired during the day?  O - Has anyone Observed you stop breathing while sleeping?  P- Do you have (or are being treated for) high blood Pressure?    B - Are you obese (Body Mass Index > 35)?  A - Is your Age 50 years old or older?  N - Is your Neck size greater than 16 inches?  G - Are you male Gender?  A sleep physician can prescribe a breathing device that prevents tissues in the throat from blocking your airway. Or your doctor may recommend using a dental device (oral breathing device) to help keep your airway open. In some cases, surgery may

## 2024-11-06 RX ORDER — SERTRALINE HYDROCHLORIDE 25 MG/1
25 TABLET, FILM COATED ORAL DAILY
Qty: 90 TABLET | Refills: 3 | Status: SHIPPED | OUTPATIENT
Start: 2024-11-06

## 2024-11-15 ENCOUNTER — PROCEDURE VISIT (OUTPATIENT)
Age: 84
End: 2024-11-15

## 2024-11-15 DIAGNOSIS — G47.33 OBSTRUCTIVE SLEEP APNEA (ADULT) (PEDIATRIC): Primary | ICD-10-CM

## 2024-11-15 NOTE — PROGRESS NOTES
S>Adama Luz is a 84 y.o. male seen today to receive a home sleep testing unit (WatchPAT).    Patient was educated on proper hookup and operation of the WatchPAT HST via detailed instruction sheet .  Instruction forms with after hours contact and documentation were signed.    O>    There were no vitals taken for this visit.      A>  1. Obstructive sleep apnea (adult) (pediatric)          P>  General information regarding operations and maintenance of the device was provided.  Follow-up appointment was made to return the WatchPAT HST. He will be contacted once the results have been reviewed.  He was asked to contact our office for any problems regarding his home sleep test study.

## 2024-11-16 ENCOUNTER — HOSPITAL ENCOUNTER (OUTPATIENT)
Facility: HOSPITAL | Age: 84
Discharge: HOME | End: 2024-11-19
Payer: MEDICARE

## 2024-11-16 PROCEDURE — 95800 SLP STDY UNATTENDED: CPT | Performed by: INTERNAL MEDICINE

## 2024-11-20 ENCOUNTER — CLINICAL DOCUMENTATION (OUTPATIENT)
Age: 84
End: 2024-11-20

## 2024-11-20 NOTE — PROGRESS NOTES
HSAT in R-drive      Lead tech to convey results to patient   Staff to scan HSAT to chart    The Watchpat home sleep apnea test showed AHI - 2.6/hour. . Some snoring.This correlates with a test that is negative for significant sleep apnea.  Based on the results of the home sleep apnea test, PAP therapy is not indicated at this time.  A positive test shows an apnea index/AHI of >5/hour.    A few mild events when on his back.he can minimize these by sleeping on his sides.     Repeat HSAT is indicated if symptoms worsen.        
English

## 2024-11-21 ENCOUNTER — CLINICAL DOCUMENTATION (OUTPATIENT)
Age: 84
End: 2024-11-21

## 2024-11-25 ENCOUNTER — TELEPHONE (OUTPATIENT)
Age: 84
End: 2024-11-25

## 2024-12-03 ENCOUNTER — OFFICE VISIT (OUTPATIENT)
Age: 84
End: 2024-12-03
Payer: MEDICARE

## 2024-12-03 DIAGNOSIS — G31.84 MILD COGNITIVE IMPAIRMENT: Primary | ICD-10-CM

## 2024-12-03 PROCEDURE — 96132 NRPSYC TST EVAL PHYS/QHP 1ST: CPT | Performed by: CLINICAL NEUROPSYCHOLOGIST

## 2024-12-03 PROCEDURE — 1123F ACP DISCUSS/DSCN MKR DOCD: CPT | Performed by: CLINICAL NEUROPSYCHOLOGIST

## 2024-12-03 PROCEDURE — 96136 PSYCL/NRPSYC TST PHY/QHP 1ST: CPT | Performed by: CLINICAL NEUROPSYCHOLOGIST

## 2024-12-03 PROCEDURE — 90791 PSYCH DIAGNOSTIC EVALUATION: CPT | Performed by: CLINICAL NEUROPSYCHOLOGIST

## 2024-12-03 PROCEDURE — 1036F TOBACCO NON-USER: CPT | Performed by: CLINICAL NEUROPSYCHOLOGIST

## 2024-12-03 NOTE — PROGRESS NOTES
The patient reported he sustained multiple concussions, including concussions where he lost consciousness.  He reported he never required medical treatment following the injuries and he did not notice any lasting cognitive sequela.     Neurodiagnostic Findings:  Imaging: MRI brain (5/20/2024) revealed \"minimal parietal predominant cerebral atrophy.  There is no intracranial mass, hemorrhage or evidence of acute infarction.  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  Discharged with the rank of \"corporal.\"  Relationship Status:  26 years  Children: 1 biological child from previous marriage and 2 children through current marriage  Housing: Independently with wife in private residence  Legal: Denied.  Wife has POA     Substance Use:  Alcohol: Averages approximately 1 drink per day  Nicotine: Smokes during his teens and early 20s  Recreational/Illicit Substances: Denied  Treatment: Denied    BEHAVIORAL OBSERVATIONS:  Appearance: Casually dressed, Well-groomed, and Appeared stated age  Orientation: Person, Place, Time, and Situation  Motor: Ambulated independently, Adequate gait, Adequate posture, and No involuntary movements  Thought Processes: Clear, Coherent, Logical, and Organized  Hearing and Vision: Hearing was diminished which occasionally interfered with conversation and instructions.  Vision was adequate.  Speech: Appropriate for Rate, Tone, Prosody, and Volume  Comprehension: Adequate  Interpersonal Skills: Adequate  Affect: Appropriate  Ability as Historian: Fair  Insight: Fair  Judgment: Fair  Testing Behaviors: Rapport was adequately established from the patient and the examiner.  The patient

## 2024-12-05 RX ORDER — ISOSORBIDE MONONITRATE 60 MG/1
60 TABLET, EXTENDED RELEASE ORAL DAILY
Qty: 90 TABLET | Refills: 3 | Status: SHIPPED | OUTPATIENT
Start: 2024-12-05

## 2024-12-17 ENCOUNTER — HOSPITAL ENCOUNTER (OUTPATIENT)
Facility: HOSPITAL | Age: 84
Discharge: HOME | End: 2024-12-20
Attending: ORTHOPAEDIC SURGERY
Payer: MEDICARE

## 2024-12-17 DIAGNOSIS — M47.812 CERVICAL SPONDYLOSIS WITHOUT MYELOPATHY: ICD-10-CM

## 2024-12-17 DIAGNOSIS — M54.2 CERVICALGIA: ICD-10-CM

## 2024-12-17 PROCEDURE — 72141 MRI NECK SPINE W/O DYE: CPT

## 2024-12-31 ENCOUNTER — TELEMEDICINE (OUTPATIENT)
Age: 84
End: 2024-12-31
Payer: MEDICARE

## 2024-12-31 DIAGNOSIS — U07.1 COVID-19: Primary | ICD-10-CM

## 2024-12-31 PROCEDURE — 1036F TOBACCO NON-USER: CPT | Performed by: FAMILY MEDICINE

## 2024-12-31 PROCEDURE — G8484 FLU IMMUNIZE NO ADMIN: HCPCS | Performed by: FAMILY MEDICINE

## 2024-12-31 PROCEDURE — 99213 OFFICE O/P EST LOW 20 MIN: CPT | Performed by: FAMILY MEDICINE

## 2024-12-31 PROCEDURE — G8427 DOCREV CUR MEDS BY ELIG CLIN: HCPCS | Performed by: FAMILY MEDICINE

## 2024-12-31 PROCEDURE — 1123F ACP DISCUSS/DSCN MKR DOCD: CPT | Performed by: FAMILY MEDICINE

## 2024-12-31 PROCEDURE — G8419 CALC BMI OUT NRM PARAM NOF/U: HCPCS | Performed by: FAMILY MEDICINE

## 2024-12-31 PROCEDURE — 1159F MED LIST DOCD IN RCRD: CPT | Performed by: FAMILY MEDICINE

## 2024-12-31 ASSESSMENT — PATIENT HEALTH QUESTIONNAIRE - PHQ9
SUM OF ALL RESPONSES TO PHQ QUESTIONS 1-9: 0
SUM OF ALL RESPONSES TO PHQ QUESTIONS 1-9: 0
SUM OF ALL RESPONSES TO PHQ9 QUESTIONS 1 & 2: 0
2. FEELING DOWN, DEPRESSED OR HOPELESS: NOT AT ALL
SUM OF ALL RESPONSES TO PHQ QUESTIONS 1-9: 0
SUM OF ALL RESPONSES TO PHQ QUESTIONS 1-9: 0
1. LITTLE INTEREST OR PLEASURE IN DOING THINGS: NOT AT ALL

## 2024-12-31 NOTE — PROGRESS NOTES
\"Have you been to the ER, urgent care clinic since your last visit?  Hospitalized since your last visit?\"    NO    “Have you seen or consulted any other health care providers outside our system since your last visit?”    NO         
Spouse name: Not on file    Number of children: Not on file    Years of education: Not on file    Highest education level: Not on file   Occupational History    Not on file   Tobacco Use    Smoking status: Never    Smokeless tobacco: Never    Tobacco comments:     Light use 50 yrs. ago.  Long time exposure to 2nd hand smoke.   Vaping Use    Vaping status: Never Used   Substance and Sexual Activity    Alcohol use: Yes     Alcohol/week: 11.0 standard drinks of alcohol     Types: 3 Glasses of wine, 5 Cans of beer, 3 Drinks containing 0.5 oz of alcohol per week     Comment: averages one a day    Drug use: Never    Sexual activity: Not Currently     Partners: Female   Other Topics Concern    Not on file   Social History Narrative    Not on file     Social Determinants of Health     Financial Resource Strain: Low Risk  (6/25/2024)    Overall Financial Resource Strain (CARDIA)     Difficulty of Paying Living Expenses: Not very hard   Food Insecurity: No Food Insecurity (6/25/2024)    Hunger Vital Sign     Worried About Running Out of Food in the Last Year: Never true     Ran Out of Food in the Last Year: Never true   Transportation Needs: Unknown (6/25/2024)    PRAPARE - Transportation     Lack of Transportation (Medical): Not on file     Lack of Transportation (Non-Medical): No   Physical Activity: Sufficiently Active (6/20/2024)    Exercise Vital Sign     Days of Exercise per Week: 4 days     Minutes of Exercise per Session: 60 min   Stress: Not on file   Social Connections: Not on file   Intimate Partner Violence: Not on file   Housing Stability: Unknown (6/25/2024)    Housing Stability Vital Sign     Unable to Pay for Housing in the Last Year: Not on file     Number of Places Lived in the Last Year: Not on file     Unstable Housing in the Last Year: No        Current Outpatient Medications on File Prior to Visit   Medication Sig Dispense Refill    isosorbide mononitrate (IMDUR) 60 MG extended release tablet TAKE 1

## 2025-01-05 NOTE — PROGRESS NOTES
HISTORY OF PRESENT ILLNESS   Adama Luz   is a 84 y.o.  male.  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  CARD Dr Christianson  Nephedwardo Montes De Oca-allopurinol increased to 600mg qd for recurrent gout, labs q 6 months  URO Dr Austin-has fu soon hx left renal pelvis cancer and bladder cancer    S/p L spine injection for sciatica -Dr Aquino  Neck and back pain-- C spine MRI ordered-right shoulder pain-will get C spine injection soon  Had HSAT-cpap not indicated  Home BP wnl  Anxiety on zoloft-effective per pt, does not frustrated now    Had covid infection treated with paxlovid 12/31-better now  Retired -worked in [employer]    Last OV  CARD Dr Meghan Montes De Oca  URO -Dr Austin  Last A1c 6.2  LDL45     Has neuropathic pain in feet-when awakes knees to feet feel numb. 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) 11/21/2022    Chronic renal disease, stage III (HCC) 06/07/2022    SOB (shortness of breath) 05/27/2022    Cancer of left renal pelvis (HCC) 05/25/2022    Vitamin B12 deficiency 12/14/2021    Vitamin D deficiency 12/14/2021    Diabetic peripheral neuropathy associated with type 2 diabetes mellitus (HCC) 12/14/2021    Lumbar back pain with radiculopathy affecting right lower extremity 12/14/2021    Degenerative lumbar spinal stenosis 12/14/2021    Diverticulosis 06/29/2019    Status post right hip replacement 06/13/2019    Hx of gout 05/28/2019    Coronary artery disease involving native coronary artery of native heart without angina pectoris 05/28/2019    Idiopathic peripheral neuropathy 05/28/2019

## 2025-01-09 ENCOUNTER — OFFICE VISIT (OUTPATIENT)
Age: 85
End: 2025-01-09
Payer: MEDICARE

## 2025-01-09 VITALS
WEIGHT: 191.4 LBS | TEMPERATURE: 98 F | OXYGEN SATURATION: 97 % | BODY MASS INDEX: 27.4 KG/M2 | SYSTOLIC BLOOD PRESSURE: 102 MMHG | RESPIRATION RATE: 18 BRPM | DIASTOLIC BLOOD PRESSURE: 58 MMHG | HEIGHT: 70 IN | HEART RATE: 67 BPM

## 2025-01-09 DIAGNOSIS — Z85.528 HX OF RENAL CELL CANCER: ICD-10-CM

## 2025-01-09 DIAGNOSIS — C67.9 MALIGNANT NEOPLASM OF URINARY BLADDER, UNSPECIFIED SITE (HCC): ICD-10-CM

## 2025-01-09 DIAGNOSIS — Z00.00 MEDICARE ANNUAL WELLNESS VISIT, SUBSEQUENT: ICD-10-CM

## 2025-01-09 DIAGNOSIS — L71.9 ROSACEA: ICD-10-CM

## 2025-01-09 DIAGNOSIS — I25.10 CORONARY ARTERY DISEASE INVOLVING NATIVE CORONARY ARTERY OF NATIVE HEART WITHOUT ANGINA PECTORIS: Primary | ICD-10-CM

## 2025-01-09 DIAGNOSIS — E11.42 TYPE 2 DIABETES MELLITUS WITH DIABETIC POLYNEUROPATHY, UNSPECIFIED WHETHER LONG TERM INSULIN USE (HCC): ICD-10-CM

## 2025-01-09 DIAGNOSIS — F41.9 ANXIETY: ICD-10-CM

## 2025-01-09 DIAGNOSIS — M54.2 NECK PAIN: ICD-10-CM

## 2025-01-09 DIAGNOSIS — Z87.39 HX OF GOUT: ICD-10-CM

## 2025-01-09 DIAGNOSIS — I10 HYPERTENSION, UNSPECIFIED TYPE: ICD-10-CM

## 2025-01-09 DIAGNOSIS — N18.30 STAGE 3 CHRONIC KIDNEY DISEASE, UNSPECIFIED WHETHER STAGE 3A OR 3B CKD (HCC): ICD-10-CM

## 2025-01-09 LAB
ANION GAP SERPL CALC-SCNC: 7 MMOL/L (ref 2–12)
BUN SERPL-MCNC: 23 MG/DL (ref 6–20)
BUN/CREAT SERPL: 17 (ref 12–20)
CALCIUM SERPL-MCNC: 9.9 MG/DL (ref 8.5–10.1)
CHLORIDE SERPL-SCNC: 104 MMOL/L (ref 97–108)
CO2 SERPL-SCNC: 29 MMOL/L (ref 21–32)
CREAT SERPL-MCNC: 1.33 MG/DL (ref 0.7–1.3)
EST. AVERAGE GLUCOSE BLD GHB EST-MCNC: 137 MG/DL
GLUCOSE SERPL-MCNC: 80 MG/DL (ref 65–100)
HBA1C MFR BLD: 6.4 % (ref 4–5.6)
POTASSIUM SERPL-SCNC: 5 MMOL/L (ref 3.5–5.1)
SODIUM SERPL-SCNC: 140 MMOL/L (ref 136–145)

## 2025-01-09 PROCEDURE — 99214 OFFICE O/P EST MOD 30 MIN: CPT | Performed by: INTERNAL MEDICINE

## 2025-01-09 RX ORDER — METRONIDAZOLE 10 MG/G
GEL TOPICAL DAILY
Qty: 60 G | Refills: 5 | Status: SHIPPED | OUTPATIENT
Start: 2025-01-09

## 2025-01-09 SDOH — ECONOMIC STABILITY: FOOD INSECURITY: WITHIN THE PAST 12 MONTHS, THE FOOD YOU BOUGHT JUST DIDN'T LAST AND YOU DIDN'T HAVE MONEY TO GET MORE.: NEVER TRUE

## 2025-01-09 SDOH — ECONOMIC STABILITY: FOOD INSECURITY: WITHIN THE PAST 12 MONTHS, YOU WORRIED THAT YOUR FOOD WOULD RUN OUT BEFORE YOU GOT MONEY TO BUY MORE.: NEVER TRUE

## 2025-01-09 ASSESSMENT — LIFESTYLE VARIABLES
HOW MANY STANDARD DRINKS CONTAINING ALCOHOL DO YOU HAVE ON A TYPICAL DAY: 1 OR 2
HOW OFTEN DURING THE LAST YEAR HAVE YOU FOUND THAT YOU WERE NOT ABLE TO STOP DRINKING ONCE YOU HAD STARTED: NEVER
HOW OFTEN DURING THE LAST YEAR HAVE YOU NEEDED AN ALCOHOLIC DRINK FIRST THING IN THE MORNING TO GET YOURSELF GOING AFTER A NIGHT OF HEAVY DRINKING: NEVER
HOW OFTEN DURING THE LAST YEAR HAVE YOU BEEN UNABLE TO REMEMBER WHAT HAPPENED THE NIGHT BEFORE BECAUSE YOU HAD BEEN DRINKING: NEVER
HOW OFTEN DURING THE LAST YEAR HAVE YOU FAILED TO DO WHAT WAS NORMALLY EXPECTED FROM YOU BECAUSE OF DRINKING: NEVER
HOW OFTEN DURING THE LAST YEAR HAVE YOU HAD A FEELING OF GUILT OR REMORSE AFTER DRINKING: NEVER
HAS A RELATIVE, FRIEND, DOCTOR, OR ANOTHER HEALTH PROFESSIONAL EXPRESSED CONCERN ABOUT YOUR DRINKING OR SUGGESTED YOU CUT DOWN: NO
HOW OFTEN DO YOU HAVE A DRINK CONTAINING ALCOHOL: 4 OR MORE TIMES A WEEK
HAVE YOU OR SOMEONE ELSE BEEN INJURED AS A RESULT OF YOUR DRINKING: NO

## 2025-01-09 ASSESSMENT — PATIENT HEALTH QUESTIONNAIRE - PHQ9
SUM OF ALL RESPONSES TO PHQ QUESTIONS 1-9: 0
2. FEELING DOWN, DEPRESSED OR HOPELESS: NOT AT ALL
1. LITTLE INTEREST OR PLEASURE IN DOING THINGS: NOT AT ALL
SUM OF ALL RESPONSES TO PHQ QUESTIONS 1-9: 0
SUM OF ALL RESPONSES TO PHQ9 QUESTIONS 1 & 2: 0

## 2025-01-09 NOTE — PROGRESS NOTES
\"Have you been to the ER, urgent care clinic since your last visit?  Hospitalized since your last visit?\"    NO    “Have you seen or consulted any other health care providers outside our system since your last visit?”    Dr garay // neck specialist   [place]

## 2025-01-09 NOTE — PATIENT INSTRUCTIONS

## 2025-02-05 DIAGNOSIS — E11.42 TYPE 2 DIABETES MELLITUS WITH DIABETIC POLYNEUROPATHY, UNSPECIFIED WHETHER LONG TERM INSULIN USE (HCC): ICD-10-CM

## 2025-02-05 NOTE — TELEPHONE ENCOUNTER
Patient is needing a refill of gabapentin. He would like a 30-day prescription sent to [facility] on [address], [place], [place].     He would like the rest sent to states they told him that his prescription sent to a [facility]. They told him it  and to call us. Thank you.

## 2025-02-06 RX ORDER — GABAPENTIN 600 MG/1
TABLET ORAL
Qty: 360 TABLET | Refills: 1 | Status: SHIPPED | OUTPATIENT
Start: 2025-02-06 | End: 2026-02-05

## 2025-04-21 DIAGNOSIS — E11.42 TYPE 2 DIABETES MELLITUS WITH DIABETIC POLYNEUROPATHY, UNSPECIFIED WHETHER LONG TERM INSULIN USE (HCC): ICD-10-CM

## 2025-04-21 RX ORDER — GABAPENTIN 600 MG/1
TABLET ORAL
Qty: 360 TABLET | Refills: 1 | Status: SHIPPED | OUTPATIENT
Start: 2025-04-21 | End: 2026-04-20

## 2025-04-21 NOTE — TELEPHONE ENCOUNTER
Pt requesting medication be sent to his home delivery pharmacy, [facility].     Last office visit 10/29/2024  Next office visit 07/01/2025  Last med refill 02/06/2025

## 2025-05-14 ENCOUNTER — PATIENT MESSAGE (OUTPATIENT)
Age: 85
End: 2025-05-14

## 2025-07-28 RX ORDER — ATORVASTATIN CALCIUM 80 MG/1
80 TABLET, FILM COATED ORAL DAILY
Qty: 90 TABLET | Refills: 3 | Status: SHIPPED | OUTPATIENT
Start: 2025-07-28

## 2025-08-12 ENCOUNTER — TELEPHONE (OUTPATIENT)
Age: 85
End: 2025-08-12

## 2025-08-18 RX ORDER — LOSARTAN POTASSIUM 50 MG/1
50 TABLET ORAL DAILY
Qty: 90 TABLET | Refills: 3 | Status: SHIPPED | OUTPATIENT
Start: 2025-08-18

## 2025-08-21 ENCOUNTER — PATIENT MESSAGE (OUTPATIENT)
Age: 85
End: 2025-08-21

## 2025-08-21 ENCOUNTER — OFFICE VISIT (OUTPATIENT)
Age: 85
End: 2025-08-21
Payer: MEDICARE

## 2025-08-21 VITALS
BODY MASS INDEX: 27.83 KG/M2 | RESPIRATION RATE: 16 BRPM | WEIGHT: 194.4 LBS | TEMPERATURE: 97.3 F | HEIGHT: 70 IN | HEART RATE: 63 BPM | OXYGEN SATURATION: 97 % | SYSTOLIC BLOOD PRESSURE: 108 MMHG | DIASTOLIC BLOOD PRESSURE: 64 MMHG

## 2025-08-21 DIAGNOSIS — N18.31 STAGE 3A CHRONIC KIDNEY DISEASE (HCC): ICD-10-CM

## 2025-08-21 DIAGNOSIS — E11.9 TYPE 2 DIABETES MELLITUS WITHOUT COMPLICATION, WITHOUT LONG-TERM CURRENT USE OF INSULIN (HCC): Primary | ICD-10-CM

## 2025-08-21 DIAGNOSIS — E11.9 TYPE 2 DIABETES MELLITUS WITHOUT COMPLICATION, WITHOUT LONG-TERM CURRENT USE OF INSULIN (HCC): ICD-10-CM

## 2025-08-21 DIAGNOSIS — I10 HYPERTENSION, UNSPECIFIED TYPE: ICD-10-CM

## 2025-08-21 DIAGNOSIS — E78.5 HYPERLIPIDEMIA, UNSPECIFIED HYPERLIPIDEMIA TYPE: ICD-10-CM

## 2025-08-21 DIAGNOSIS — I25.10 CORONARY ARTERY DISEASE INVOLVING NATIVE CORONARY ARTERY OF NATIVE HEART WITHOUT ANGINA PECTORIS: ICD-10-CM

## 2025-08-21 DIAGNOSIS — C67.9 MALIGNANT NEOPLASM OF URINARY BLADDER, UNSPECIFIED SITE (HCC): ICD-10-CM

## 2025-08-21 PROCEDURE — 3078F DIAST BP <80 MM HG: CPT | Performed by: INTERNAL MEDICINE

## 2025-08-21 PROCEDURE — 1036F TOBACCO NON-USER: CPT | Performed by: INTERNAL MEDICINE

## 2025-08-21 PROCEDURE — 1159F MED LIST DOCD IN RCRD: CPT | Performed by: INTERNAL MEDICINE

## 2025-08-21 PROCEDURE — 3044F HG A1C LEVEL LT 7.0%: CPT | Performed by: INTERNAL MEDICINE

## 2025-08-21 PROCEDURE — G8427 DOCREV CUR MEDS BY ELIG CLIN: HCPCS | Performed by: INTERNAL MEDICINE

## 2025-08-21 PROCEDURE — 99214 OFFICE O/P EST MOD 30 MIN: CPT | Performed by: INTERNAL MEDICINE

## 2025-08-21 PROCEDURE — 3074F SYST BP LT 130 MM HG: CPT | Performed by: INTERNAL MEDICINE

## 2025-08-21 PROCEDURE — G8419 CALC BMI OUT NRM PARAM NOF/U: HCPCS | Performed by: INTERNAL MEDICINE

## 2025-08-21 PROCEDURE — 1123F ACP DISCUSS/DSCN MKR DOCD: CPT | Performed by: INTERNAL MEDICINE

## 2025-08-21 SDOH — ECONOMIC STABILITY: FOOD INSECURITY: WITHIN THE PAST 12 MONTHS, THE FOOD YOU BOUGHT JUST DIDN'T LAST AND YOU DIDN'T HAVE MONEY TO GET MORE.: NEVER TRUE

## 2025-08-21 SDOH — ECONOMIC STABILITY: FOOD INSECURITY: WITHIN THE PAST 12 MONTHS, YOU WORRIED THAT YOUR FOOD WOULD RUN OUT BEFORE YOU GOT MONEY TO BUY MORE.: NEVER TRUE

## 2025-08-21 ASSESSMENT — PATIENT HEALTH QUESTIONNAIRE - PHQ9
SUM OF ALL RESPONSES TO PHQ QUESTIONS 1-9: 0
2. FEELING DOWN, DEPRESSED OR HOPELESS: NOT AT ALL
SUM OF ALL RESPONSES TO PHQ QUESTIONS 1-9: 0
SUM OF ALL RESPONSES TO PHQ QUESTIONS 1-9: 0
1. LITTLE INTEREST OR PLEASURE IN DOING THINGS: NOT AT ALL
SUM OF ALL RESPONSES TO PHQ QUESTIONS 1-9: 0

## 2025-08-22 LAB
ANION GAP SERPL CALC-SCNC: 11 MMOL/L (ref 2–14)
BUN SERPL-MCNC: 20 MG/DL (ref 8–23)
BUN/CREAT SERPL: 15 (ref 12–20)
CALCIUM SERPL-MCNC: 9.9 MG/DL (ref 8.8–10.2)
CHLORIDE SERPL-SCNC: 101 MMOL/L (ref 98–107)
CHOLEST SERPL-MCNC: 111 MG/DL (ref 0–200)
CO2 SERPL-SCNC: 28 MMOL/L (ref 20–29)
CREAT SERPL-MCNC: 1.34 MG/DL (ref 0.7–1.2)
ERYTHROCYTE [DISTWIDTH] IN BLOOD BY AUTOMATED COUNT: 17 % (ref 11.5–14.5)
EST. AVERAGE GLUCOSE BLD GHB EST-MCNC: 142 MG/DL
GLUCOSE SERPL-MCNC: 102 MG/DL (ref 65–100)
HBA1C MFR BLD: 6.6 % (ref 4–5.6)
HCT VFR BLD AUTO: 42.2 % (ref 36.6–50.3)
HDLC SERPL-MCNC: 42 MG/DL (ref 40–60)
HDLC SERPL: 2.6 (ref 0–5)
HGB BLD-MCNC: 13.4 G/DL (ref 12.1–17)
LDLC SERPL CALC-MCNC: 48 MG/DL (ref 0–100)
MCH RBC QN AUTO: 31.1 PG (ref 26–34)
MCHC RBC AUTO-ENTMCNC: 31.8 G/DL (ref 30–36.5)
MCV RBC AUTO: 97.9 FL (ref 80–99)
NRBC # BLD: 0 K/UL (ref 0–0.01)
NRBC BLD-RTO: 0 PER 100 WBC
PLATELET # BLD AUTO: 224 K/UL (ref 150–400)
PMV BLD AUTO: 11.2 FL (ref 8.9–12.9)
POTASSIUM SERPL-SCNC: 5.3 MMOL/L (ref 3.5–5.1)
RBC # BLD AUTO: 4.31 M/UL (ref 4.1–5.7)
SODIUM SERPL-SCNC: 139 MMOL/L (ref 136–145)
TRIGL SERPL-MCNC: 104 MG/DL (ref 0–150)
VLDLC SERPL CALC-MCNC: 21 MG/DL
WBC # BLD AUTO: 11.5 K/UL (ref 4.1–11.1)

## 2025-08-25 ENCOUNTER — PATIENT MESSAGE (OUTPATIENT)
Age: 85
End: 2025-08-25

## (undated) DEVICE — FORCEPS BX L160CM DIA8MM GRSP DISECT CUP TIP NONLOCKING ROT

## (undated) DEVICE — SYR 3ML LL TIP 1/10ML GRAD --

## (undated) DEVICE — TISSUE RETRIEVAL SYSTEM: Brand: INZII RETRIEVAL SYSTEM

## (undated) DEVICE — Device

## (undated) DEVICE — KENDALL SCD EXPRESS SLEEVES, KNEE LENGTH, MEDIUM: Brand: KENDALL SCD

## (undated) DEVICE — COLUMN DRAPE

## (undated) DEVICE — VISUALIZATION SYSTEM: Brand: CLEARIFY

## (undated) DEVICE — TRI-LUMEN FILTERED TUBE SET WITH ACTIVATED CHARCOAL FILTER: Brand: AIRSEAL

## (undated) DEVICE — SUTURE ABSORBABLE MONOFILAMENT 2-0 WND CLOSURE GRN V-LOC 180 VLOCL0315

## (undated) DEVICE — Z DISCONTINUED USE 2717541 SUTURE STRATAFIX SZ 3-0 L30CM NONABSORBABLE UD L26MM FS 3/8

## (undated) DEVICE — EVAC SMOKE SEECLEAR XCL -- SEE CLEAR

## (undated) DEVICE — STERILE POLYISOPRENE POWDER-FREE SURGICAL GLOVES WITH EMOLLIENT COATING: Brand: PROTEXIS

## (undated) DEVICE — SYR 10ML LUER LOK 1/5ML GRAD --

## (undated) DEVICE — CONTAINER SPEC 20 ML LID NEUT BUFF FORMALIN 10 % POLYPR STS

## (undated) DEVICE — OSCILLATING TIP SAW CARTRIDGE: Brand: PRECISION FALCON

## (undated) DEVICE — CANISTER, RIGID, 3000CC: Brand: MEDLINE INDUSTRIES, INC.

## (undated) DEVICE — SUTURE PDS II SZ 0 L60IN ABSRB VLT L48MM CTX 1/2 CIR Z990G

## (undated) DEVICE — COVER MPLR TIP CRV SCIS ACC DA VINCI

## (undated) DEVICE — INSTRUMENT BATTERY

## (undated) DEVICE — CLIP INT L POLYMER LOK LIG HEM O LOK

## (undated) DEVICE — BLADELESS OBTURATOR: Brand: WECK VISTA

## (undated) DEVICE — GENERAL LAPAROSCOPY-MRMC: Brand: MEDLINE INDUSTRIES, INC.

## (undated) DEVICE — SET ADMIN 16ML TBNG L100IN 2 Y INJ SITE IV PIGGY BK DISP

## (undated) DEVICE — BASIN EMSIS 16OZ GRAPHITE PLAS KID SHP MOLD GRAD FOR ORAL

## (undated) DEVICE — NEEDLE HYPO 18GA L1.5IN PNK S STL HUB POLYPR SHLD REG BVL

## (undated) DEVICE — SUTURE MCRYL SZ 4-0 L27IN ABSRB UD L19MM PS-2 1/2 CIR PRIM Y426H

## (undated) DEVICE — (D)PREP SKN CHLRAPRP APPL 26ML -- CONVERT TO ITEM 371833

## (undated) DEVICE — LAPAROSCOPIC TROCAR SLEEVE/SINGLE USE: Brand: KII® OPTICAL ACCESS SYSTEM

## (undated) DEVICE — STERILE POLYISOPRENE POWDER-FREE SURGICAL GLOVES: Brand: PROTEXIS

## (undated) DEVICE — DRAPE,REIN 53X77,STERILE: Brand: MEDLINE

## (undated) DEVICE — 4-PORT MANIFOLD: Brand: NEPTUNE 2

## (undated) DEVICE — TAPE,CLOTH/SILK,CURAD,3"X10YD,LF,40/CS: Brand: CURAD

## (undated) DEVICE — 1200 GUARD II KIT W/5MM TUBE W/O VAC TUBE: Brand: GUARDIAN

## (undated) DEVICE — SYR 50ML LR LCK 1ML GRAD NSAF --

## (undated) DEVICE — ELECTRODE BLDE L4IN NONINSULATED EDGE

## (undated) DEVICE — STRAP,POSITIONING,KNEE/BODY,FOAM,4X60": Brand: MEDLINE

## (undated) DEVICE — BAG SPEC BIOHZRD 10 X 10 IN --

## (undated) DEVICE — NDL SPNE QNCKE 18GX3.5IN LF --

## (undated) DEVICE — SUTURE VLOC 90 2/0 VL 6 GS-22 VLOCM2105

## (undated) DEVICE — TOWEL 4 PLY TISS 19X30 SUE WHT

## (undated) DEVICE — ELECTRODE,RADIOTRANSLUCENT,FOAM,5PK: Brand: MEDLINE

## (undated) DEVICE — ADHESIVE SKIN CLOSURE 4X22 CM PREMIERPRO EXOFINFUSION DISP

## (undated) DEVICE — 3M™ IOBAN™ 2 ANTIMICROBIAL INCISE DRAPE 6651EZ: Brand: IOBAN™ 2

## (undated) DEVICE — WATERPROOF, BACTERIA PROOF DRESSING WITH ABSORBENT SEE THROUGH PAD: Brand: OPSITE POST-OP VISIBLE 20X10CM CTN 20

## (undated) DEVICE — INSUFFLATION NEEDLE: Brand: SURGINEEDLE

## (undated) DEVICE — SUTURE STRATAFIX SYMMETRIC SZ 1 L18IN ABSRB VLT CT1 L36CM SXPP1A404

## (undated) DEVICE — SOLUTION IRRIG 1000ML H2O STRL BLT

## (undated) DEVICE — YANKAUER,TAPERED BULBOUS TIP,W/O VENT: Brand: MEDLINE

## (undated) DEVICE — AIRSEAL 12 MM ACCESS PORT AND PALM GRIP OBTURATOR WITH BLADELESS OPTICAL TIP, 100 MM LENGTH: Brand: AIRSEAL

## (undated) DEVICE — SUTURE VCRL SZ 0 L27IN ABSRB UD L36MM CT-1 1/2 CIR J260H

## (undated) DEVICE — SOLIDIFIER MEDC 1200ML -- CONVERT TO 356117

## (undated) DEVICE — SOLUTION IRRIG 3000ML 0.9% SOD CHL USP UROMATIC PLAS CONT

## (undated) DEVICE — BLOCK BITE ENDOSCP AD 21 MM W/ DIL BLU LF DISP

## (undated) DEVICE — HYPODERMIC SAFETY NEEDLE: Brand: MONOJECT

## (undated) DEVICE — TRAP FLUID BUFFALO FLTR

## (undated) DEVICE — 3M™ IOBAN™ 2 ANTIMICROBIAL INCISE DRAPE 6650EZ: Brand: IOBAN™ 2

## (undated) DEVICE — SOLUTION IRRIG 3000ML H2O STRL BAG

## (undated) DEVICE — CYSTO MRMC: Brand: MEDLINE INDUSTRIES, INC.

## (undated) DEVICE — INFECTION CONTROL KIT SYS

## (undated) DEVICE — HANDLE LT SNAP ON ULT DURABLE LENS FOR TRUMPF ALC DISPOSABLE

## (undated) DEVICE — GLOVE ORANGE PI 7 1/2   MSG9075

## (undated) DEVICE — ARM DRAPE

## (undated) DEVICE — SMOKE EVACUATION PENCIL: Brand: VALLEYLAB

## (undated) DEVICE — CATH IV AUTOGRD BC PNK 20GA 25 -- INSYTE

## (undated) DEVICE — TOTAL TRAY, 16FR 10ML SIL FOLEY, URN: Brand: MEDLINE

## (undated) DEVICE — PIN EXT FIX SCHNZ 3 MM

## (undated) DEVICE — SOLUTION IV 1000ML 0.9% SOD CHL

## (undated) DEVICE — NEONATAL-ADULT SPO2 SENSOR: Brand: NELLCOR

## (undated) DEVICE — SEAL UNIV 5-8MM DISP BX/10 -- DA VINCI XI - SNGL USE

## (undated) DEVICE — Z CONVERTED USE 2107985 COVER FLROSCP W36XL28IN 4 SIDE ADH

## (undated) DEVICE — PREP KIT PEEL PTCH POVIDONE IOD

## (undated) DEVICE — Z DISCONTINUED PER MEDLINE LINE GAS SAMPLING O2/CO2 LNG AD 13 FT NSL W/ TBNG FILTERLINE

## (undated) DEVICE — REM POLYHESIVE ADULT PATIENT RETURN ELECTRODE: Brand: VALLEYLAB